# Patient Record
Sex: FEMALE | Race: WHITE | NOT HISPANIC OR LATINO | Employment: OTHER | URBAN - NONMETROPOLITAN AREA
[De-identification: names, ages, dates, MRNs, and addresses within clinical notes are randomized per-mention and may not be internally consistent; named-entity substitution may affect disease eponyms.]

---

## 2017-01-03 RX ORDER — LEVOTHYROXINE SODIUM 88 UG/1
TABLET ORAL
Qty: 15 TABLET | Refills: 0 | Status: SHIPPED | OUTPATIENT
Start: 2017-01-03 | End: 2017-06-21 | Stop reason: SDUPTHER

## 2017-01-31 ENCOUNTER — OFFICE VISIT (OUTPATIENT)
Dept: CARDIOLOGY | Facility: CLINIC | Age: 82
End: 2017-01-31

## 2017-01-31 VITALS
BODY MASS INDEX: 32.39 KG/M2 | DIASTOLIC BLOOD PRESSURE: 83 MMHG | HEIGHT: 62 IN | SYSTOLIC BLOOD PRESSURE: 120 MMHG | WEIGHT: 176 LBS | HEART RATE: 78 BPM

## 2017-01-31 DIAGNOSIS — I10 ESSENTIAL HYPERTENSION: ICD-10-CM

## 2017-01-31 DIAGNOSIS — I20.9 ANGINA PECTORIS (HCC): ICD-10-CM

## 2017-01-31 DIAGNOSIS — E78.49 OTHER HYPERLIPIDEMIA: ICD-10-CM

## 2017-01-31 DIAGNOSIS — I73.9 PERIPHERAL ARTERIAL DISEASE (HCC): ICD-10-CM

## 2017-01-31 DIAGNOSIS — I25.709 CORONARY ARTERY DISEASE INVOLVING CORONARY BYPASS GRAFT OF NATIVE HEART WITH ANGINA PECTORIS (HCC): Primary | ICD-10-CM

## 2017-01-31 DIAGNOSIS — E11.9 DIABETES MELLITUS WITHOUT COMPLICATION (HCC): ICD-10-CM

## 2017-01-31 PROBLEM — E78.5 HYPERLIPIDEMIA: Status: ACTIVE | Noted: 2017-01-31

## 2017-01-31 PROCEDURE — 93000 ELECTROCARDIOGRAM COMPLETE: CPT | Performed by: INTERNAL MEDICINE

## 2017-01-31 PROCEDURE — 99214 OFFICE O/P EST MOD 30 MIN: CPT | Performed by: INTERNAL MEDICINE

## 2017-01-31 NOTE — PROGRESS NOTES
[facility]  OFFICE NOTE    Tatyana Yeager  82 y.o. female    01/31/2017  1. Coronary artery disease involving 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    2. Peripheral arterial disease    3. Diabetes mellitus without complication    4. Essential hypertension    5. Other hyperlipidemia    6. Angina pectoris        Chief complaint - chronic stable angina      History of present Illness- 82-year-old lady with chronic stable angina with the [place] class IIb's symptoms.  She also has history of shortness of breath associated with it and her sugars are running a bit high.  She is seeing Dr. Shi at [facility].  She does not want to do any invasive testing at this point including stress test.  She had a echo last year and EF was normal and EKG was unremarkable today.  She is on maximal medical therapy.  She cannot afford Ranexa as it is too expensive.  She denies any TIA symptoms or any GI symptoms and the blood pressure is well controlled.              Allergies   Allergen Reactions   • Prilosec [Omeprazole]    • Statins          Past Medical History   Diagnosis Date   • Acquired hypothyroidism    • Aortic valve stenosis    • Artificial lens present    • Borderline glaucoma    • Chronic depression    • Chronic pain    • Corneal foreign body      OS   • Coronary arteriosclerosis    • Cortical senile cataract    • Diabetes mellitus      NO RETINOPATHY   • Diverticular disease of colon    • Essential hypertension    • Generalized anxiety disorder    • GERD (gastroesophageal reflux disease)    • Histoplasmosis with retinitis    • History of bone density study 03/26/2009     DXA BONE DENSITY AXIAL 28204 (Normal for the hips and lumbar region. Hips represent 3.9% improvement.Lumbar represents 1.3 % improvement)   • History of echocardiogram 09/16/2015     Echocardiogram W/ color flow 31230 (Overall LV contractility normal with Ef of 55% with LVH and diastolic  relaxation abnormality of the left ventricle.Moderate to severe AV stenosis.Mild mitral regurg.No intracardiac mass or thrombus.)   • History of mammogram 03/26/2009     benign findings    • Hyperlipidemia      not able to take statin     • Joint pain    • Low back pain    • Mammogram declined 2013   • Nonexudative age-related macular degeneration      MILD   • Nuclear cataract    • Peripheral vascular disease    • Posterior subcapsular polar senile cataract    • Primary open angle glaucoma    • Transient ischemic attack involving carotid artery      Carotid territory transient ischemic attack     • Transient visual loss    • Type 2 diabetes mellitus          Past Surgical History   Procedure Laterality Date   • Angioplasty aortic  2013     Angioplasty, aorta (dilation) (stents of bilateral common iliacarteries)   2013 EMM[facility]UEL STATON    • Coronary artery bypass graft  01/12/2005     X2   • Colonoscopy  04/25/2012     Colon endoscopy 77666 (Diverticulum in sigmoid colon. Internal & external hemorrhoids found.)   • Colonoscopy  05/12/2008     Colon endoscopy (Rectal bleeding. Ischemic colitis (watershed region) w.bleeding w/o infarction. Internal hemorrhoids w/o bleeding)   • Cryotherapy  12/12/2011     Destruction of Benign Lesion (1-14) 98994 (Actinic keratosis)    • Endoscopy  04/25/2012     EGD w/ tube 95193 (Normal esophagus. Gastritis in stoamch. Biopsy taken. Normal duodenum. Biopsy taken   • Endoscopy  05/12/2008     EGD with tube (Gastroesophageal reflux w/o esophagitis. Chronic gastritis/ duodenitis w/o bleeding)   • Excision lesion       Excision, breast lesion   • Other surgical history  04/23/2013     EXTENDED VISUAL FIELDS STUDY 95147 (Glaucoma, primary open angle)    • Injection of medication  10/04/2012     Kenalog (3)   • Spinal fusion       Neck spinal fusion   • Other surgical history  09/16/2013     OCT DISC NFL 08153 (Glaucoma, borderline)   • Hysterectomy       Partial hyst (for cervical cancer)   •  Cataract extraction  12/19/2013     Remove cataract, insert lens (Left eye.)   • Cataract extraction  09/20/2006     Remove cataract, insert lens (nuclear cataract, right)   • Excision lesion  06/30/2000     Remove forearm lesion (Wide local excision of 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 w/ rhomboid flap closure)   • Excision lesion       Remove nose lesion (1.5 mm punch biopsy,right tip of nose)   • Cataract extraction Bilateral          Family History   Problem Relation Age of Onset   • Cirrhosis Mother      LIVER   • Throat cancer Father    • Coronary artery disease Other    • Diabetes Other    • Heart disease Other    • Breast cancer Neg Hx    • Colon cancer Neg Hx    • Endometrial cancer Neg Hx          Social History     Social History   • Marital status:      Spouse name: N/A   • Number of children: N/A   • Years of education: N/A     Occupational History   • Not on file.     Social History Main Topics   • Smoking status: Former Smoker     Packs/day: 1.00     Years: 14.00   • Smokeless tobacco: Never Used      Comment: QUIT AT AGE 35   • Alcohol use No   • Drug use: No   • Sexual activity: Defer     Other Topics Concern   • Not on file     Social History Narrative         Current Outpatient Prescriptions   Medication Sig Dispense Refill   • ALPRAZolam (XANAX) 1 MG tablet Take 1 mg by mouth Every Night.     • amLODIPine (NORVASC) 5 MG tablet Take 5 mg by mouth Daily.     • clopidogrel (PLAVIX) 75 MG tablet Take 75 mg by mouth Daily.     • doxepin (SINEquan) 100 MG capsule Take 100 mg by mouth Every Night.     • famotidine (PEPCID) 20 MG tablet Take 20 mg by mouth 2 (Two) Times a Day.     • hydrochlorothiazide (HYDRODIURIL) 25 MG tablet Take 25 mg by mouth Daily.     • HYDROcodone-acetaminophen (NORCO) 5-325 MG per tablet   0   • isosorbide mononitrate (IMDUR) 30 MG 24 hr tablet Take 30 mg by mouth Daily.     • levothyroxine (SYNTHROID, LEVOTHROID) 88 MCG tablet TAKE 1 TABLET EVERY DAY  FOR   "THYROID 15 tablet 0   • metFORMIN (GLUCOPHAGE) 500 MG tablet Take 500 mg by mouth Daily.     • metoprolol succinate XL (TOPROL-XL) 50 MG 24 hr tablet Take 50 mg by mouth Daily.     • nitroglycerin (NITROSTAT) 0.4 MG SL tablet Place 0.4 mg under the tongue. DISSOLVE ONE TABLET UNDER THE TONGUE AS NEEDED FOR CHEST PAIN. YOU MAY REPEAT ONCE IF NEEDED AND GO TO THE E.R.     • Omega-3 Fatty Acids (FISH OIL) 1000 MG capsule capsule Take 1,000 mg by mouth 3 (Three) Times a Day With Meals.       No current facility-administered medications for this visit.          Review of Systems     Constitution: Denies any fatigue, fever or chills    HENT: Denies any headache, hearing impairment,     Eyes: Denies any blurring of vision, or photophobia     Cardivascular - As per history of present illness     Respiratory system-denies any COPD, shortness of breath with NYHA class II symptoms        Endocrine:   history of hyperlipidemia, diabetes,                             Musculoskeletal:   history of arthritis with musculoskeletal problems    Gastrointestinal: No nausea, vomiting, or melena    Genitourinary: No dysuria or hematuria    Neurological:   Has peripheral neuropathy    Psychiatric/Behavioral:        No history of depression,  No history of bipolar disorder or schizophrenia     Hematological- no history of easy bruising or any bleeding diathesis            OBJECTIVE    Visit Vitals   • /83   • Pulse 78   • Ht 62\" (157.5 cm)   • Wt 176 lb (79.8 kg)   • BMI 32.19 kg/m2         Physical Exam     Constitutional: is oriented to person, place, and time.     Skin-warm and dry    Well developed and nourished in no acute distress      Head: Normocephalic and atraumatic.     Eyes: Pupils are equal, round, and reactive to light.     Neck: Neck supple. No bruit in the carotids, no elevation of JVD    Cardiovascular: Range in the fifth intercostal space   Regular rate, and  Rhythm,    S1 greater than S2, no S3 or S4, no gallop "     Pulmonary/Chest:   Air  Entry is equal on both sides  No wheezing or crackles,      Abdominal: Soft.  No hepatosplenomegaly, bowel sounds are present    Musculoskeletal: No kyphoscoliosis, no significant thickening of the joints    Neurological: is alert and oriented to person, place, and time.    cranial nerve are intact .   No motor or sensory deficit    Extremities-no edema, no radial femoral delay      Psychiatric: He has a normal mood and affect.                  His behavior is normal.             ECG 12 Lead  Date/Time: 1/31/2017 2:03 PM  Performed by: WARREN ZARAGOZA  Authorized by: WARREN ZARAGOZA   Comparison: not compared with previous ECG   Rhythm: sinus rhythm  Comments: Sinus rhythm at a rate of 78 with nonspecific ST-T changes              A/P    CAD status post CABG with chronic stable angina-on maximum medical therapy possible, not on Ranexa due to high cost.  She does not want to do any invasive or interventional workup at this point.  She uses one or 2 nitroglycerin when she gets angina and it goes away with rest.    Hypertension well controlled with amlodipine, HCTZ, isosorbide and Toprol-XL.    Diabetes on metformin and last A1c was good.  Cannot tolerate statins or any cholesterol medicines even though her cholesterol is high.    GERD on Pepcid and she will follow with me in 6 months or earlier if her symptoms becomes unstable.      Warren Zaragoza MD  1/31/2017  1:58 PM    EMR Dragon/Transcription disclaimer:   Some of this note may be an electronic transcription/translation of spoken language to printed text. The electronic translation of spoken language may permit erroneous, or at times, nonsensical words or phrases to be inadvertently transcribed; Although I have reviewed the note for such errors, some may still exist.

## 2017-01-31 NOTE — MR AVS SNAPSHOT
Tatyana Yeager   1/31/2017 1:15 PM   Office Visit    Dept Phone:  [phone]   Encounter #:  [id number]    Provider:  Alaf Whalen MD   Department:  [facility] CARDIOLOGY                Your Full Care Plan              Today's Medication Changes          These changes are accurate as of: 1/31/17  2:01 PM.  If you have any questions, ask your nurse or doctor.               Stop taking medication(s)listed here:     acetaminophen-codeine 300-30 MG per tablet   Commonly known as:  TYLENOL #3   Stopped by:  Alfa Whalen MD           sucralfate 1 G tablet   Commonly known as:  CARAFATE   Stopped by:  Alfa Whalen MD                      Your Updated Medication List          This list is accurate as of: 1/31/17  2:01 PM.  Always use your most recent med list.                ALPRAZolam 1 MG tablet   Commonly known as:  XANAX       amLODIPine 5 MG tablet   Commonly known as:  NORVASC       clopidogrel 75 MG tablet   Commonly known as:  PLAVIX       doxepin 100 MG capsule   Commonly known as:  SINEquan       famotidine 20 MG tablet   Commonly known as:  PEPCID       fish oil 1000 MG capsule capsule       hydrochlorothiazide 25 MG tablet   Commonly known as:  HYDRODIURIL       HYDROcodone-acetaminophen 5-325 MG per tablet   Commonly known as:  NORCO       isosorbide mononitrate 30 MG 24 hr tablet   Commonly known as:  IMDUR       levothyroxine 88 MCG tablet   Commonly known as:  SYNTHROID, LEVOTHROID   TAKE 1 TABLET EVERY DAY  FOR  THYROID       metFORMIN 500 MG tablet   Commonly known as:  GLUCOPHAGE       metoprolol succinate XL 50 MG 24 hr tablet   Commonly known as:  TOPROL-XL       nitroglycerin 0.4 MG SL tablet   Commonly known as:  NITROSTAT               You Were Diagnosed With        Codes Comments    Coronary artery disease involving 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    -  Primary ICD-10-CM: I25.709  ICD-9-CM: 414.05,  413.9     Peripheral arterial disease     ICD-10-CM: I73.9  ICD-9-CM: 443.9     Diabetes mellitus without complication     ICD-10-CM: E11.9  ICD-9-CM: 250.00     Essential hypertension     ICD-10-CM: I10  ICD-9-CM: 401.9     Other hyperlipidemia     ICD-10-CM: E78.4  ICD-9-CM: 272.4     Angina pectoris     ICD-10-CM: I20.9  ICD-9-CM: 413.9       Instructions     None    Patient Instructions History      Upcoming Appointments     Visit Type Date Time Department    OFFICE VISIT 2017  1:15 PM [facility] HEART CARE [facility]    OFFICE VISIT 2017 11:30 AM [facility] HEART [facility]    OFFICE VISIT 10/18/2017  1:00 PM [facility] OPHTHALMOLOGY [facility] Signup     [facility] [employer] allows you to send messages to your doctor, view your test results, renew your prescriptions, schedule appointments, and more. To sign up, go to Integrate and click on the Sign Up Now link in the New User? box. Enter your [employer] Activation Code exactly as it appears below along with the last four digits of your Social Security Number and your Date of Birth () to complete the sign-up process. If you do not sign up before the expiration date, you must request a new code.    [employer] Activation Code: [email or web address]  Expires: 2017  2:01 PM    If you have questions, you can email [email or web address] or call [phone] to talk to our [employer] staff. Remember, [employer] is NOT to be used for urgent needs. For medical emergencies, dial 911.               Other Info from Your Visit           Your Appointments     2017 11:30 AM CDT   Office Visit with Alfa Whalen MD   [facility] CARDIOLOGY (--)    [address]  [place] [address] [phone]           Arrive 15 minutes prior to appointment.            Oct 18, 2017  1:00 PM CDT   Office Visit with Javier Tipton MD   [facility] OPHTHALMOLOGY (--)    [address] [place] [address] [address]  [address] [facility]  [facility] [address] [phone]           Arrive 15 minutes prior to appointment.              Allergies     Prilosec [Omeprazole]      Statins        Vital Signs     Blood Pressure Pulse Height Weight Body Mass Index Smoking Status    120/83 78 62\" (157.5 cm) 176 lb (79.8 kg) 32.19 kg/m2 Former Smoker      Problems and Diagnoses Noted     Arteriosclerosis of bypass graft of coronary artery    Diabetes mellitus without complication    High blood pressure    High cholesterol or triglycerides    Peripheral arterial disease    Chest pain due to insufficient blood supply to heart            "

## 2017-05-18 ENCOUNTER — TRANSCRIBE ORDERS (OUTPATIENT)
Dept: GENERAL RADIOLOGY | Facility: CLINIC | Age: 82
End: 2017-05-18

## 2017-05-18 DIAGNOSIS — I65.29 CAROTID ARTERY OBSTRUCTION, UNSPECIFIED LATERALITY: Primary | ICD-10-CM

## 2017-06-07 ENCOUNTER — TELEPHONE (OUTPATIENT)
Dept: CARDIOLOGY | Facility: CLINIC | Age: 82
End: 2017-06-07

## 2017-06-21 ENCOUNTER — OFFICE VISIT (OUTPATIENT)
Dept: FAMILY MEDICINE CLINIC | Facility: CLINIC | Age: 82
End: 2017-06-21

## 2017-06-21 ENCOUNTER — LAB (OUTPATIENT)
Dept: LAB | Facility: OTHER | Age: 82
End: 2017-06-21

## 2017-06-21 VITALS
OXYGEN SATURATION: 96 % | WEIGHT: 172 LBS | SYSTOLIC BLOOD PRESSURE: 128 MMHG | HEART RATE: 74 BPM | DIASTOLIC BLOOD PRESSURE: 68 MMHG | BODY MASS INDEX: 31.65 KG/M2 | TEMPERATURE: 96.8 F | HEIGHT: 62 IN

## 2017-06-21 DIAGNOSIS — E78.2 MIXED HYPERLIPIDEMIA: ICD-10-CM

## 2017-06-21 DIAGNOSIS — G47.00 INSOMNIA, UNSPECIFIED TYPE: ICD-10-CM

## 2017-06-21 DIAGNOSIS — K59.03 DRUG-INDUCED CONSTIPATION: ICD-10-CM

## 2017-06-21 DIAGNOSIS — H81.12 BPPV (BENIGN PAROXYSMAL POSITIONAL VERTIGO), LEFT: Primary | ICD-10-CM

## 2017-06-21 DIAGNOSIS — R79.89 ABNORMAL SERUM CREATININE LEVEL: ICD-10-CM

## 2017-06-21 DIAGNOSIS — E03.9 ACQUIRED HYPOTHYROIDISM: ICD-10-CM

## 2017-06-21 DIAGNOSIS — E11.9 DIABETES MELLITUS WITHOUT COMPLICATION (HCC): ICD-10-CM

## 2017-06-21 DIAGNOSIS — K21.9 GASTROESOPHAGEAL REFLUX DISEASE WITHOUT ESOPHAGITIS: ICD-10-CM

## 2017-06-21 DIAGNOSIS — I10 ESSENTIAL HYPERTENSION: ICD-10-CM

## 2017-06-21 PROBLEM — H81.10 BPPV (BENIGN PAROXYSMAL POSITIONAL VERTIGO): Status: ACTIVE | Noted: 2017-06-21

## 2017-06-21 LAB
ANION GAP SERPL CALCULATED.3IONS-SCNC: 13 MMOL/L (ref 5–15)
BUN BLD-MCNC: 27 MG/DL (ref 8–25)
BUN/CREAT SERPL: 20.8 (ref 7–25)
CALCIUM SPEC-SCNC: 10.2 MG/DL (ref 8.4–10.8)
CHLORIDE SERPL-SCNC: 98 MMOL/L (ref 100–112)
CO2 SERPL-SCNC: 28 MMOL/L (ref 20–32)
CREAT BLD-MCNC: 1.3 MG/DL (ref 0.4–1.3)
GFR SERPL CREATININE-BSD FRML MDRD: 39 ML/MIN/1.73 (ref 39–90)
GLUCOSE BLD-MCNC: 103 MG/DL (ref 70–100)
POTASSIUM BLD-SCNC: 4.5 MMOL/L (ref 3.4–5.4)
SODIUM BLD-SCNC: 139 MMOL/L (ref 134–146)

## 2017-06-21 PROCEDURE — 80048 BASIC METABOLIC PNL TOTAL CA: CPT | Performed by: FAMILY MEDICINE

## 2017-06-21 PROCEDURE — 99214 OFFICE O/P EST MOD 30 MIN: CPT | Performed by: FAMILY MEDICINE

## 2017-06-21 PROCEDURE — 36415 COLL VENOUS BLD VENIPUNCTURE: CPT | Performed by: FAMILY MEDICINE

## 2017-06-21 RX ORDER — LISINOPRIL 2.5 MG/1
2.5 TABLET ORAL DAILY
COMMUNITY
End: 2017-07-21 | Stop reason: SDUPTHER

## 2017-06-21 RX ORDER — HYDROCHLOROTHIAZIDE 25 MG/1
25 TABLET ORAL DAILY
Qty: 90 TABLET | Refills: 1 | Status: SHIPPED | OUTPATIENT
Start: 2017-06-21 | End: 2017-11-15 | Stop reason: SDUPTHER

## 2017-06-21 RX ORDER — FAMOTIDINE 20 MG/1
20 TABLET, FILM COATED ORAL 2 TIMES DAILY
Qty: 180 TABLET | Refills: 1 | Status: SHIPPED | OUTPATIENT
Start: 2017-06-21 | End: 2017-11-15 | Stop reason: SDUPTHER

## 2017-06-21 RX ORDER — LEVOTHYROXINE SODIUM 88 UG/1
88 TABLET ORAL DAILY
Qty: 90 TABLET | Refills: 1 | Status: SHIPPED | OUTPATIENT
Start: 2017-06-21 | End: 2017-11-15 | Stop reason: SDUPTHER

## 2017-06-21 RX ORDER — GLIPIZIDE 5 MG/1
5 TABLET ORAL
COMMUNITY
End: 2017-07-21 | Stop reason: SDUPTHER

## 2017-06-21 RX ORDER — AMLODIPINE BESYLATE 5 MG/1
5 TABLET ORAL DAILY
Qty: 90 TABLET | Refills: 1 | Status: SHIPPED | OUTPATIENT
Start: 2017-06-21 | End: 2017-11-15 | Stop reason: SDUPTHER

## 2017-06-21 RX ORDER — ISOSORBIDE MONONITRATE 30 MG/1
30 TABLET, EXTENDED RELEASE ORAL DAILY
Qty: 90 TABLET | Refills: 1 | Status: SHIPPED | OUTPATIENT
Start: 2017-06-21 | End: 2017-11-15 | Stop reason: SDUPTHER

## 2017-06-21 RX ORDER — ALPRAZOLAM 1 MG/1
1 TABLET ORAL NIGHTLY PRN
Qty: 90 TABLET | Refills: 0 | Status: SHIPPED | OUTPATIENT
Start: 2017-06-21 | End: 2017-07-21 | Stop reason: SDUPTHER

## 2017-06-21 RX ORDER — MECLIZINE HYDROCHLORIDE 25 MG/1
25 TABLET ORAL 3 TIMES DAILY PRN
Qty: 30 TABLET | Refills: 0 | Status: CANCELLED | OUTPATIENT
Start: 2017-06-21 | End: 2017-07-01

## 2017-06-21 RX ORDER — MECLIZINE HYDROCHLORIDE 25 MG/1
25 TABLET ORAL 3 TIMES DAILY PRN
Qty: 90 TABLET | Refills: 0 | Status: SHIPPED | OUTPATIENT
Start: 2017-06-21 | End: 2017-07-21 | Stop reason: SDUPTHER

## 2017-06-21 RX ORDER — LEVOTHYROXINE SODIUM 88 UG/1
TABLET ORAL
Qty: 15 TABLET | Refills: 0 | Status: CANCELLED | OUTPATIENT
Start: 2017-06-21

## 2017-06-21 RX ORDER — CLOPIDOGREL BISULFATE 75 MG/1
75 TABLET ORAL DAILY
Qty: 90 TABLET | Refills: 1 | Status: SHIPPED | OUTPATIENT
Start: 2017-06-21 | End: 2017-11-15 | Stop reason: SDUPTHER

## 2017-06-21 RX ORDER — DOXEPIN HYDROCHLORIDE 100 MG/1
100 CAPSULE ORAL NIGHTLY
Qty: 90 CAPSULE | Refills: 1 | Status: SHIPPED | OUTPATIENT
Start: 2017-06-21 | End: 2017-11-15 | Stop reason: SDUPTHER

## 2017-06-21 RX ORDER — METOPROLOL SUCCINATE 50 MG/1
50 TABLET, EXTENDED RELEASE ORAL DAILY
Qty: 90 TABLET | Refills: 1 | Status: SHIPPED | OUTPATIENT
Start: 2017-06-21 | End: 2017-11-15 | Stop reason: SDUPTHER

## 2017-06-21 RX ORDER — CHLORAL HYDRATE 500 MG
1000 CAPSULE ORAL
Qty: 270 CAPSULE | Refills: 1 | Status: SHIPPED | OUTPATIENT
Start: 2017-06-21 | End: 2017-11-15 | Stop reason: SDUPTHER

## 2017-06-21 NOTE — PATIENT INSTRUCTIONS
Dizziness  Dizziness is a common problem. It is a feeling of unsteadiness or light-headedness. You may feel like you are about to faint. Dizziness can lead to injury if you stumble or fall. Anyone can become dizzy, but dizziness is more common in older adults. This condition can be caused by a number of things, including medicines, dehydration, or illness.  HOME CARE INSTRUCTIONS  Taking these steps may help with your condition:  Eating and Drinking  · Drink enough fluid to keep your urine clear or pale yellow. This helps to keep you from becoming dehydrated. Try to drink more clear fluids, such as water.  · Do not drink alcohol.  · Limit your caffeine intake if directed by your health care provider.  · Limit your salt intake if directed by your health care provider.  Activity  · Avoid making quick movements.    Rise slowly from chairs and steady yourself until you feel okay.    In the morning, first sit up on the side of the bed. When you feel okay, stand slowly while you hold onto something until you know that your balance is fine.  · Move your legs often if you need to  one place for a long time. Tighten and relax your muscles in your legs while you are standing.  · Do not drive or operate heavy machinery if you feel dizzy.  · Avoid bending down if you feel dizzy. Place items in your home so that they are easy for you to reach without leaning over.  Lifestyle  · Do not use any tobacco products, including cigarettes, chewing tobacco, or electronic cigarettes. If you need help quitting, ask your health care provider.  · Try to reduce your stress level, such as with yoga or meditation. Talk with your health care provider if you need help.  General Instructions  · Watch your dizziness for any changes.  · Take medicines only as directed by your health care provider. Talk with your health care provider if you think that your dizziness is caused by a medicine that you are taking.  · Tell a friend or a family  member that you are feeling dizzy. If he or she notices any changes in your behavior, have this person call your health care provider.  · Keep all follow-up visits as directed by your health care provider. This is important.  SEEK MEDICAL CARE IF:  · Your dizziness does not go away.  · Your dizziness or light-headedness gets worse.  · You feel nauseous.  · You have reduced hearing.  · You have new symptoms.  · You are unsteady on your feet or you feel like the room is spinning.  SEEK IMMEDIATE MEDICAL CARE IF:  · You vomit or have diarrhea and are unable to eat or drink anything.  · You have problems talking, walking, swallowing, or using your arms, hands, or legs.  · You feel generally weak.  · You are not thinking clearly or you have trouble forming sentences. It may take a friend or family member to notice this.  · You have chest pain, abdominal pain, shortness of breath, or sweating.  · Your vision changes.  · You notice any bleeding.  · You have a headache.  · You have neck pain or a stiff neck.  · You have a fever.     This information is not intended to replace advice given to you by your health care provider. Make sure you discuss any questions you have with your health care provider.     Document Released: 06/13/2002 Document Revised: 05/03/2016 Document Reviewed: 12/14/2015  [employer] Interactive Patient Education ©2017 [employer]  Epley Maneuver Self-Care  WHAT IS THE EPLEY MANEUVER?  The Epley maneuver is an exercise you can do to relieve symptoms of benign paroxysmal positional vertigo (BPPV). This condition is often just referred to as vertigo. BPPV is caused by the movement of tiny crystals (canaliths) inside your inner ear. The accumulation and movement of canaliths in your inner ear causes a sudden spinning sensation (vertigo) when you move your head to certain positions. Vertigo usually lasts about 30 seconds. BPPV usually occurs in just one ear. If you get vertigo when you lie on your left side,  you probably have BPPV in your left ear. Your health care provider can tell you which ear is involved.   BPPV may be caused by a head injury. Many people older than 50 get BPPV for unknown reasons. If you have been diagnosed with BPPV, your health care provider may teach you how to do this maneuver. BPPV is not life threatening (benign) and usually goes away in time.   WHEN SHOULD I PERFORM THE EPLEY MANEUVER?  You can do this maneuver at home whenever you have symptoms of vertigo. You may do the Epley maneuver up to 3 times a day until your symptoms of vertigo go away.  HOW SHOULD I DO THE EPLEY MANEUVER?  1. Sit on the edge of a bed or table with your back straight. Your legs should be extended or hanging over the edge of the bed or table.    2. Turn your head prison toward the affected ear.    3. Lie backward quickly with your head turned until you are lying flat on your back. You may want to position a pillow under your shoulders.    4. Hold this position for 30 seconds. You may experience an attack of vertigo. This is normal. Hold this position until the vertigo stops.  5. Then turn your head to the opposite direction until your unaffected ear is facing the floor.    6. Hold this position for 30 seconds. You may experience an attack of vertigo. This is normal. Hold this position until the vertigo stops.  7. Now turn your whole body to the same side as your head. Hold for another 30 seconds.    8. You can then sit back up.  ARE THERE RISKS TO THIS MANEUVER?  In some cases, you may have other symptoms (such as changes in your vision, weakness, or numbness). If you have these symptoms, stop doing the maneuver and call your health care provider. Even if doing these maneuvers relieves your vertigo, you may still have dizziness. Dizziness is the sensation of light-headedness but without the sensation of movement. Even though the Epley maneuver may relieve your vertigo, it is possible that your symptoms will return  within 5 years.  WHAT SHOULD I DO AFTER THIS MANEUVER?  After doing the Epley maneuver, you can return to your normal activities. Ask your doctor if there is anything you should do at home to prevent vertigo. This may include:  · Sleeping with two or more pillows to keep your head elevated.  · Not sleeping on the side of your affected ear.  · Getting up slowly from bed.  · Avoiding sudden movements during the day.  · Avoiding extreme head movement, like looking up or bending over.  · Wearing a cervical collar to prevent sudden head movements.  WHAT SHOULD I DO IF MY SYMPTOMS GET WORSE?  Call your health care provider if your vertigo gets worse. Call your provider right way if you have other symptoms, including:   · Nausea.  · Vomiting.  · Headache.  · Weakness.  · Numbness.  · Vision changes.     This information is not intended to replace advice given to you by your health care provider. Make sure you discuss any questions you have with your health care provider.     Document Released: 12/23/2014 Document Reviewed: 12/23/2014  [employer] Interactive Patient Education ©2017 [employer]

## 2017-06-21 NOTE — PROGRESS NOTES
Subjective   Chief Complaint   Patient presents with   • Establish Care     Tatyana Yeager is a 82 y.o. female.   Establish Care    History of Present Illness     cmh - chronic back pain, insomnia, gerd, htn, hypothyroidism, diabetes    Chronic back pain with DDD - controlled with norco  Followed by pain clinic with Dr Goodwin    Htn, PVD, VAMSI, stents placed, AS, MR, CABG in 2005 -   controlled with imdur, metoprolol, lisinopril, norvasc, hctz  Anticoagulated with plavix  Followed by Dr Alfa Montiel scheduled in 6 months    Diabetes - controlled with glipizide  Stopped metformin due to elevated serum creatinine  Last bmp checked in May -  Cr was 1.3  Fasting blood glucose this morning was 87  Average reading is 100  Needs diabetic foot exam    hypothryoidism - controlled with synthroid    gerd - controlled with pepcid    Insomnia - controlled with doxepin    Anxiety - controlled with xanax PRN    C/o vertigo    C/o constipation - new onset from starting glipizide  Took linzess from family member with improvement  Would be interested in starting medication    The following portions of the patient's history were reviewed and updated as appropriate: allergies, current medications, past family history, past medical history, past social history, past surgical history and problem list.    Review of Systems   Constitutional: Negative for appetite change, chills, fatigue and fever.   HENT: Negative for congestion, ear pain, rhinorrhea and sore throat.    Eyes: Negative for pain.   Respiratory: Negative for cough and shortness of breath.    Cardiovascular: Negative for chest pain and palpitations.   Gastrointestinal: Positive for constipation. Negative for abdominal pain, diarrhea and nausea.   Genitourinary: Negative for dysuria.   Musculoskeletal: Negative for back pain, joint swelling and neck pain.   Skin: Negative for rash.   Neurological: Positive for dizziness. Negative for headaches.       Objective   BP  "128/68  Pulse 74  Temp 96.8 °F (36 °C)  Ht 62\" (157.5 cm)  Wt 172 lb (78 kg)  SpO2 96%  BMI 31.46 kg/m2  Physical Exam   Constitutional: She is oriented to person, place, and time. She appears well-developed and well-nourished.   HENT:   Head: Normocephalic and atraumatic.   Right Ear: External ear normal.   Left Ear: External ear normal.   Nose: Nose normal.   Mouth/Throat: Oropharynx is clear and moist.   Eyes: Pupils are equal, round, and reactive to light.   Neck: Normal range of motion. Neck supple.   Cardiovascular: Normal rate, regular rhythm and normal heart sounds.    Pulmonary/Chest: Effort normal and breath sounds normal. No respiratory distress. She has no wheezes. She has no rales.   Abdominal: Soft. Bowel sounds are normal.   Musculoskeletal: Normal range of motion.   Neurological: She is alert and oriented to person, place, and time.   Positive vertigo - negative nystagmus with tianna hallpike on the left side   Skin: Skin is warm and dry.   Psychiatric: She has a normal mood and affect.   Nursing note and vitals reviewed.      Assessment/Plan   Problems Addressed this Visit        Cardiovascular and Mediastinum    Essential hypertension    Relevant Medications    amLODIPine (NORVASC) 5 MG tablet    hydrochlorothiazide (HYDRODIURIL) 25 MG tablet    isosorbide mononitrate (IMDUR) 30 MG 24 hr tablet    metoprolol succinate XL (TOPROL-XL) 50 MG 24 hr tablet    lisinopril (PRINIVIL,ZESTRIL) 2.5 MG tablet    Hyperlipidemia    Relevant Medications    Omega-3 Fatty Acids (FISH OIL) 1000 MG capsule capsule       Digestive    Drug-induced constipation       Endocrine    Diabetes mellitus without complication    Relevant Medications    glipiZIDE (GLUCOTROL) 5 MG tablet       Nervous and Auditory    BPPV (benign paroxysmal positional vertigo) - Primary      Other Visit Diagnoses     Abnormal serum creatinine level        Relevant Orders    Basic metabolic panel (Completed)    Gastroesophageal reflux disease " without esophagitis        Relevant Medications    famotidine (PEPCID) 20 MG tablet    Acquired hypothyroidism        Relevant Medications    metoprolol succinate XL (TOPROL-XL) 50 MG 24 hr tablet    levothyroxine (SYNTHROID, LEVOTHROID) 88 MCG tablet    Insomnia, unspecified type        Relevant Medications    doxepin (SINEquan) 100 MG capsule        Lab ordered    Referral to ent  Start meclizine - sent to [facility]  Educational handout    Refilled medications - mail order    Refilled xanax  Will no longer go to pain clinic  She will  her last prescription this month and I will take over her chronic pain management  At that time she will sign a controlled contract agreement    Discussed vaccinations - declined    Reviewed previous labs with patient    Needs diabetic foot exam - scheduled    Recommended medicare wellness exam    Recheck in 4 weeks

## 2017-07-21 ENCOUNTER — OFFICE VISIT (OUTPATIENT)
Dept: FAMILY MEDICINE CLINIC | Facility: CLINIC | Age: 82
End: 2017-07-21

## 2017-07-21 VITALS
SYSTOLIC BLOOD PRESSURE: 115 MMHG | WEIGHT: 172 LBS | BODY MASS INDEX: 31.65 KG/M2 | DIASTOLIC BLOOD PRESSURE: 64 MMHG | TEMPERATURE: 96.4 F | HEART RATE: 66 BPM | HEIGHT: 62 IN | OXYGEN SATURATION: 96 %

## 2017-07-21 DIAGNOSIS — M54.50 CHRONIC MIDLINE LOW BACK PAIN WITHOUT SCIATICA: Primary | ICD-10-CM

## 2017-07-21 DIAGNOSIS — G89.29 CHRONIC MIDLINE LOW BACK PAIN WITHOUT SCIATICA: Primary | ICD-10-CM

## 2017-07-21 DIAGNOSIS — E11.9 DIABETES MELLITUS WITHOUT COMPLICATION (HCC): ICD-10-CM

## 2017-07-21 DIAGNOSIS — K59.03 DRUG-INDUCED CONSTIPATION: ICD-10-CM

## 2017-07-21 DIAGNOSIS — I10 ESSENTIAL HYPERTENSION: ICD-10-CM

## 2017-07-21 DIAGNOSIS — M51.36 DDD (DEGENERATIVE DISC DISEASE), LUMBAR: ICD-10-CM

## 2017-07-21 DIAGNOSIS — E11.9 ENCOUNTER FOR COMPREHENSIVE DIABETIC FOOT EXAMINATION, TYPE 2 DIABETES MELLITUS (HCC): ICD-10-CM

## 2017-07-21 PROBLEM — K59.04 CHRONIC IDIOPATHIC CONSTIPATION: Status: ACTIVE | Noted: 2017-07-21

## 2017-07-21 PROBLEM — K59.04 CHRONIC IDIOPATHIC CONSTIPATION: Status: RESOLVED | Noted: 2017-07-21 | Resolved: 2017-07-21

## 2017-07-21 PROCEDURE — 99214 OFFICE O/P EST MOD 30 MIN: CPT | Performed by: FAMILY MEDICINE

## 2017-07-21 RX ORDER — ALPRAZOLAM 1 MG/1
1 TABLET ORAL NIGHTLY PRN
Qty: 90 TABLET | Refills: 0 | Status: SHIPPED | OUTPATIENT
Start: 2017-07-21 | End: 2017-11-15 | Stop reason: SDUPTHER

## 2017-07-21 RX ORDER — LISINOPRIL 2.5 MG/1
2.5 TABLET ORAL DAILY
Qty: 90 TABLET | Refills: 0 | Status: SHIPPED | OUTPATIENT
Start: 2017-07-21 | End: 2017-09-27 | Stop reason: SDUPTHER

## 2017-07-21 RX ORDER — HYDROCODONE BITARTRATE AND ACETAMINOPHEN 5; 325 MG/1; MG/1
1 TABLET ORAL
Refills: 0 | Status: CANCELLED | OUTPATIENT
Start: 2017-07-21

## 2017-07-21 RX ORDER — GLIPIZIDE 5 MG/1
5 TABLET ORAL
Qty: 180 TABLET | Refills: 0 | Status: SHIPPED | OUTPATIENT
Start: 2017-07-21 | End: 2017-07-25 | Stop reason: SDUPTHER

## 2017-07-21 RX ORDER — HYDROCODONE BITARTRATE AND ACETAMINOPHEN 5; 325 MG/1; MG/1
1 TABLET ORAL 2 TIMES DAILY PRN
Qty: 60 TABLET | Refills: 0 | Status: SHIPPED | OUTPATIENT
Start: 2017-07-21 | End: 2017-08-16 | Stop reason: SDUPTHER

## 2017-07-21 RX ORDER — MECLIZINE HCL 12.5 MG/1
12.5 TABLET ORAL 2 TIMES DAILY PRN
Qty: 180 TABLET | Refills: 0 | Status: SHIPPED | OUTPATIENT
Start: 2017-07-21 | End: 2017-11-15

## 2017-07-21 NOTE — PROGRESS NOTES
Subjective   Chief Complaint   Patient presents with   • Diabetes     diabetic foot exam   • Med Refill     hydrocodone last dose this morning     Tatyana Yeager is a 82 y.o. female.   Diabetes (diabetic foot exam) and Med Refill (hydrocodone last dose this morning)    History of Present Illness     cmh - chronic back pain, insomnia, gerd, htn, hypothyroidism, diabetes    Chronic back pain with DDD - controlled with norco  Followed by pain clinic with Dr Goodwin    Htn, PVD, VAMSI, stents placed, AS, MR, CABG in 2005 -   controlled with imdur, metoprolol, lisinopril, norvasc, hctz  Anticoagulated with plavix  Followed by Dr Alfa Montiel scheduled in 6 months    Diabetes - controlled with glipizide  Stopped metformin due to elevated serum creatinine  Last bmp checked in May -  Cr was 1.3  Fasting blood glucose this morning was 94  Average reading is 100  Here for a diabetic foot exam    Anxiety - controlled with xanax PRN    Vertigo - improved with meclizine  ENT appointment scheduled    constipation - doing well with linzess  Can only take this medication every other day    The following portions of the patient's history were reviewed and updated as appropriate: allergies, current medications, past family history, past medical history, past social history, past surgical history and problem list.    Review of Systems   Constitutional: Negative for appetite change, chills, fatigue and fever.   HENT: Negative for congestion, ear pain, rhinorrhea and sore throat.    Eyes: Negative for pain.   Respiratory: Negative for cough and shortness of breath.    Cardiovascular: Negative for chest pain and palpitations.   Gastrointestinal: Negative for abdominal pain, constipation, diarrhea and nausea.   Genitourinary: Negative for dysuria.   Musculoskeletal: Negative for back pain, joint swelling and neck pain.   Skin: Negative for rash.   Neurological: Negative for dizziness and headaches.       Objective   /64  Pulse  "66  Temp 96.4 °F (35.8 °C)  Ht 62\" (157.5 cm)  Wt 172 lb (78 kg)  SpO2 96%  BMI 31.46 kg/m2  Physical Exam   Constitutional: She is oriented to person, place, and time. She appears well-developed and well-nourished.   HENT:   Head: Normocephalic and atraumatic.   Eyes: Pupils are equal, round, and reactive to light.   Neck: Normal range of motion. Neck supple.   Cardiovascular: Normal rate, regular rhythm and normal heart sounds.    Pulmonary/Chest: Effort normal and breath sounds normal. No respiratory distress. She has no wheezes. She has no rales.   Abdominal: Soft. Bowel sounds are normal.   Musculoskeletal: Normal range of motion.    Tatyana had a diabetic foot exam performed today.   During the foot exam she had a monofilament test performed (10/10 bilateral).    Neurological Sensory Findings -  Unaltered sharp/dull right ankle/foot discrimination and unaltered sharp/dull left ankle/foot discrimination. No right ankle/foot altered proprioception and no left ankle/foot altered proprioception    Vascular Status -  Her exam exhibits right foot vasculature normal. Her exam exhibits no right foot edema. Her exam exhibits left foot vasculature normal. Her exam exhibits no left foot edema.   Skin Integrity  -  Her right foot skin is intact.  She has callous right foot.  She has no right foot onychomycosis, no right foot ulcer,  no ingrown toenail on right foot, right heel is not dry and cracked, no right foot warmth and no right foot blister.    Tatyana 's left foot skin is intact. She has callous left foot. She has no left foot onychomycosis, no left foot ulcer, no left ingrown toenail, no left heel dry and cracked, no left foot warmth and no left foot blister..  Neurological: She is alert and oriented to person, place, and time.   Skin: Skin is warm and dry.   Psychiatric: She has a normal mood and affect.   Nursing note and vitals reviewed.      Assessment/Plan   Problems Addressed this Visit        " Cardiovascular and Mediastinum    Essential hypertension    Relevant Medications    lisinopril (PRINIVIL,ZESTRIL) 2.5 MG tablet       Digestive    Drug-induced constipation    Relevant Medications    linaclotide (LINZESS) 72 MCG capsule capsule       Endocrine    Diabetes mellitus without complication    Relevant Medications    glipiZIDE (GLUCOTROL) 5 MG tablet    Other Relevant Orders    Basic metabolic panel       Musculoskeletal and Integument    DDD (degenerative disc disease), lumbar       Other    Chronic midline low back pain without sciatica - Primary      Other Visit Diagnoses     Encounter for comprehensive diabetic foot examination, type 2 diabetes mellitus        Relevant Medications    glipiZIDE (GLUCOTROL) 5 MG tablet        Diabetic foot exam today    Start linzess    Refilled medications    [facility] report reviewed [id number]  Controlled contract signed  [facility] query complete. Treatment plan to include limited course of prescribed  controlled substance. Risks including addiction, benefits, and alternatives presented to patient.   Refilled norco    Recheck in 4 weeks for medicare wellness visit/ refill

## 2017-07-25 RX ORDER — GLIPIZIDE 5 MG/1
5 TABLET ORAL
Qty: 180 TABLET | Refills: 0 | Status: SHIPPED | OUTPATIENT
Start: 2017-07-25 | End: 2017-08-16

## 2017-07-27 ENCOUNTER — OFFICE VISIT (OUTPATIENT)
Dept: CARDIOLOGY | Facility: CLINIC | Age: 82
End: 2017-07-27

## 2017-07-27 VITALS
HEART RATE: 70 BPM | SYSTOLIC BLOOD PRESSURE: 125 MMHG | HEIGHT: 62 IN | WEIGHT: 171 LBS | DIASTOLIC BLOOD PRESSURE: 68 MMHG | BODY MASS INDEX: 31.47 KG/M2

## 2017-07-27 DIAGNOSIS — I10 ESSENTIAL HYPERTENSION: ICD-10-CM

## 2017-07-27 DIAGNOSIS — E78.2 MIXED HYPERLIPIDEMIA: ICD-10-CM

## 2017-07-27 DIAGNOSIS — I25.709 CORONARY ARTERY DISEASE INVOLVING CORONARY BYPASS GRAFT OF NATIVE HEART WITH ANGINA PECTORIS (HCC): Primary | ICD-10-CM

## 2017-07-27 DIAGNOSIS — I73.9 PERIPHERAL ARTERIAL DISEASE (HCC): ICD-10-CM

## 2017-07-27 PROCEDURE — 99213 OFFICE O/P EST LOW 20 MIN: CPT | Performed by: INTERNAL MEDICINE

## 2017-07-27 NOTE — PROGRESS NOTES
[facility] Cardiology  OFFICE NOTE    Tatyana Yeager  82 y.o. female      1. Coronary artery disease involving 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    2. Peripheral arterial disease    3. Essential hypertension    4. Mixed hyperlipidemia        Chief complaint - chronic stable angina      History of present Illness- 82-year-old lady with chronic stable angina with the [place] class IIb's symptoms.  She also has history of shortness of breath associated with it and her sugars Are doing good and A1c is 6.2 .  She denies any change in symptoms she has chronic stable angina.   she uses nitroglycerin as needed, is on chronic isosorbide therapy.  She recently had dizziness with vertigo possibly middle ear infection and vertigo.  Her [facility] had given her meclizine and that is helping her.  She denies any nausea or vomiting          Allergies   Allergen Reactions   • Prilosec [Omeprazole]    • Statins          Past Medical History:   Diagnosis Date   • Acquired hypothyroidism    • Aortic valve stenosis    • Artificial lens present    • Borderline glaucoma    • Chronic depression    • Chronic pain    • Corneal foreign body     OS   • Coronary arteriosclerosis    • Cortical senile cataract    • Diabetes mellitus     NO RETINOPATHY   • Diverticular disease of colon    • Essential hypertension    • Generalized anxiety disorder    • GERD (gastroesophageal reflux disease)    • Histoplasmosis with retinitis    • History of bone density study 03/26/2009    DXA BONE DENSITY AXIAL 37993 (Normal for the hips and lumbar region. Hips represent 3.9% improvement.Lumbar represents 1.3 % improvement)   • History of echocardiogram 09/16/2015    Echocardiogram W/ color flow 16949 (Overall LV contractility normal with Ef of 55% with LVH and diastolic relaxation abnormality of the left ventricle.Moderate to severe AV stenosis.Mild mitral regurg.No intracardiac mass or thrombus.)   • History of mammogram  03/26/2009    benign findings    • Hyperlipidemia     not able to take statin     • Joint pain    • Low back pain    • Mammogram declined 2013   • Nonexudative age-related macular degeneration     MILD   • Nuclear cataract    • Peripheral vascular disease    • Posterior subcapsular polar senile cataract    • Primary open angle glaucoma    • Transient ischemic attack involving carotid artery     Carotid territory transient ischemic attack     • Transient visual loss    • Type 2 diabetes mellitus          Past Surgical History:   Procedure Laterality Date   • ANGIOPLASTY AORTIC  2013    Angioplasty, aorta (dilation) (stents of bilateral common iliacarteries)   2013 EMMANUEL STATON    • CATARACT EXTRACTION  12/19/2013    Remove cataract, insert lens (Left eye.)   • CATARACT EXTRACTION  09/20/2006    Remove cataract, insert lens (nuclear cataract, right)   • CATARACT EXTRACTION Bilateral    • COLONOSCOPY  04/25/2012    Colon endoscopy 54333 (Diverticulum in sigmoid colon. Internal & external hemorrhoids found.)   • COLONOSCOPY  05/12/2008    Colon endoscopy (Rectal bleeding. Ischemic colitis (watershed region) w.bleeding w/o infarction. Internal hemorrhoids w/o bleeding)   • CORONARY ARTERY BYPASS GRAFT  01/12/2005    X2   • CRYOTHERAPY  12/12/2011    Destruction of Benign Lesion (1-14) 93293 (Actinic keratosis)    • ENDOSCOPY  04/25/2012    EGD w/ tube 09774 (Normal esophagus. Gastritis in stoamch. Biopsy taken. Normal duodenum. Biopsy taken   • ENDOSCOPY  05/12/2008    EGD with tube (Gastroesophageal reflux w/o esophagitis. 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 w/ rhomboid flap closure)   • EXCISION LESION      Remove nose lesion (1.5 mm punch biopsy,right tip of nose)   • HYSTERECTOMY      Partial hyst (for cervical cancer)   • INJECTION OF  MEDICATION  10/04/2012    Kenalog (3)   • OTHER SURGICAL HISTORY  04/23/2013    EXTENDED VISUAL FIELDS STUDY 22870 (Glaucoma, primary open angle)    • OTHER SURGICAL HISTORY  09/16/2013    OCT DISC NFL 57235 (Glaucoma, borderline)   • SPINAL FUSION      Neck spinal fusion         Family History   Problem Relation Age of Onset   • Cirrhosis Mother      LIVER   • Throat cancer Father    • Coronary artery disease Other    • Diabetes Other    • Heart disease Other    • Breast cancer Neg Hx    • Colon cancer Neg Hx    • Endometrial cancer Neg Hx          Social History     Social History   • Marital status:      Spouse name: N/A   • Number of children: N/A   • Years of education: N/A     Occupational History   • Not on file.     Social History Main Topics   • Smoking status: Former Smoker     Packs/day: 1.00     Years: 14.00   • Smokeless tobacco: Never Used      Comment: QUIT AT AGE 35   • Alcohol use No   • Drug use: No   • Sexual activity: Defer     Other Topics Concern   • Not on file     Social History Narrative         Current Outpatient Prescriptions   Medication Sig Dispense Refill   • ALPRAZolam (XANAX) 1 MG tablet Take 1 tablet by mouth At Night As Needed for Anxiety. 90 tablet 0   • amLODIPine (NORVASC) 5 MG tablet Take 1 tablet by mouth Daily. 90 tablet 1   • clopidogrel (PLAVIX) 75 MG tablet Take 1 tablet by mouth Daily. 90 tablet 1   • doxepin (SINEquan) 100 MG capsule Take 1 capsule by mouth Every Night. 90 capsule 1   • famotidine (PEPCID) 20 MG tablet Take 1 tablet by mouth 2 (Two) Times a Day. 180 tablet 1   • glipiZIDE (GLUCOTROL) 5 MG tablet Take 1 tablet by mouth 2 (Two) Times a Day Before Meals. 180 tablet 0   • hydrochlorothiazide (HYDRODIURIL) 25 MG tablet Take 1 tablet by mouth Daily. 90 tablet 1   • HYDROcodone-acetaminophen (NORCO) 5-325 MG per tablet Take 1 tablet by mouth 2 (Two) Times a Day As Needed for Moderate Pain (4-6). 60 tablet 0   • isosorbide mononitrate (IMDUR) 30 MG 24 hr  "tablet Take 1 tablet by mouth Daily. 90 tablet 1   • levothyroxine (SYNTHROID, LEVOTHROID) 88 MCG tablet Take 1 tablet by mouth Daily. 90 tablet 1   • linaclotide (LINZESS) 72 MCG capsule capsule Take 1 capsule by mouth Every Other Day. 90 capsule 0   • lisinopril (PRINIVIL,ZESTRIL) 2.5 MG tablet Take 1 tablet by mouth Daily. 90 tablet 0   • meclizine (ANTIVERT) 12.5 MG tablet Take 1 tablet by mouth 2 (Two) Times a Day As Needed for dizziness. 180 tablet 0   • metoprolol succinate XL (TOPROL-XL) 50 MG 24 hr tablet Take 1 tablet by mouth Daily. 90 tablet 1   • nitroglycerin (NITROSTAT) 0.4 MG SL tablet Place 0.4 mg under the tongue. DISSOLVE ONE TABLET UNDER THE TONGUE AS NEEDED FOR CHEST PAIN. YOU MAY REPEAT ONCE IF NEEDED AND GO TO THE E.R.     • Omega-3 Fatty Acids (FISH OIL) 1000 MG capsule capsule Take 1 capsule by mouth 3 (Three) Times a Day With Meals. 270 capsule 1     No current facility-administered medications for this visit.          Review of Systems     Constitution: Denies any fatigue, fever or chills    HENT: Vertigo with dizziness    Eyes: Denies any blurring of vision, or photophobia     Cardivascular - As per history of present illness     Respiratory system-denies any COPD, shortness of breath with NYHA class II symptoms        Endocrine:   history of hyperlipidemia, diabetes,                             Musculoskeletal:   history of arthritis with musculoskeletal problems    Gastrointestinal: No nausea, vomiting, or melena    Genitourinary: No dysuria or hematuria    Neurological:   Has peripheral neuropathy    Psychiatric/Behavioral:        No history of depression,  No history of bipolar disorder or schizophrenia     Hematological- no history of easy bruising or any bleeding diathesis            OBJECTIVE    /68  Pulse 70  Ht 62\" (157.5 cm)  Wt 171 lb (77.6 kg)  BMI 31.28 kg/m2      Physical Exam     Constitutional: is oriented to person, place, and time.     Skin-warm and dry    Well " developed and nourished in no acute distress      Head: Normocephalic and atraumatic.     Eyes: Pupils are equal, round, and reactive to light.     Neck: Neck supple. No bruit in the carotids, no elevation of JVD    Cardiovascular: [place] in the fifth intercostal space   Regular rate, and  Rhythm,    S1 greater than S2, no S3 or S4, no gallop     Pulmonary/Chest:   Air  Entry is equal on both sides  No wheezing or crackles,      Abdominal: Soft.  No hepatosplenomegaly, bowel sounds are present    Musculoskeletal: No kyphoscoliosis, no significant thickening of the joints    Neurological: is alert and oriented to person, place, and time.    cranial nerve are intact .   No motor or sensory deficit    Extremities-no edema, no radial femoral delay      Psychiatric: He has a normal mood and affect.                  His behavior is normal.           Procedures      A/P    CAD status post CABG with chronic stable angina-on maximum medical therapy possible, not on Ranexa due to high cost.  She uses nitroglycerin 0.4 mg when necessary for chest pain and that is helping her.    Hypertension well controlled with amlodipine, HCTZ, isosorbide and Toprol-XL.    Diabetes on metformin and last A1c was good.      Cannot tolerate statins or any cholesterol medicines even though her cholesterol is high.  Her LDL is 223, triglyceride is 342, HDL is 44 and total cholesterol is 235    GERD on Pepcid and she will follow with me in 8 months      Alfa Whalen MD  7/27/2017  10:51 AM    EMR Dragon/Transcription disclaimer:   Some of this note may be an electronic transcription/translation of spoken language to printed text. The electronic translation of spoken language may permit erroneous, or at times, nonsensical words or phrases to be inadvertently transcribed; Although I have reviewed the note for such errors, some may still exist.

## 2017-07-31 ENCOUNTER — OFFICE VISIT (OUTPATIENT)
Dept: OTOLARYNGOLOGY | Facility: CLINIC | Age: 82
End: 2017-07-31

## 2017-07-31 VITALS
HEIGHT: 62 IN | TEMPERATURE: 96.3 F | SYSTOLIC BLOOD PRESSURE: 132 MMHG | BODY MASS INDEX: 31.28 KG/M2 | WEIGHT: 170 LBS | DIASTOLIC BLOOD PRESSURE: 68 MMHG

## 2017-07-31 DIAGNOSIS — H81.12 BPPV (BENIGN PAROXYSMAL POSITIONAL VERTIGO), LEFT: Primary | ICD-10-CM

## 2017-07-31 PROCEDURE — 99203 OFFICE O/P NEW LOW 30 MIN: CPT | Performed by: OTOLARYNGOLOGY

## 2017-08-03 ENCOUNTER — CONSULT (OUTPATIENT)
Dept: PHYSICAL THERAPY | Facility: CLINIC | Age: 82
End: 2017-08-03

## 2017-08-03 DIAGNOSIS — H81.12 BPPV (BENIGN PAROXYSMAL POSITIONAL VERTIGO), LEFT: Primary | ICD-10-CM

## 2017-08-03 PROCEDURE — 95992 CANALITH REPOSITIONING PROC: CPT | Performed by: PHYSICAL THERAPIST

## 2017-08-03 PROCEDURE — 97161 PT EVAL LOW COMPLEX 20 MIN: CPT | Performed by: PHYSICAL THERAPIST

## 2017-08-03 PROCEDURE — G8978 MOBILITY CURRENT STATUS: HCPCS | Performed by: PHYSICAL THERAPIST

## 2017-08-03 PROCEDURE — G8979 MOBILITY GOAL STATUS: HCPCS | Performed by: PHYSICAL THERAPIST

## 2017-08-03 NOTE — PROGRESS NOTES
Outpatient Physical Therapy Ortho Initial Evaluation       Patient Name: Tatyana Yeager  : 1934  MRN: [id number]  Today's Date: 8/3/2017      Visit Date: 2017    TIME IN 1540    TIME OUT 1612    Patient Active Problem List   Diagnosis   • Diabetes mellitus without complication   • Keratitis sicca, bilateral   • Artificial lens present   • Peripheral arterial disease   • Coronary artery disease involving 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   • Essential hypertension   • Hyperlipidemia   • BPPV (benign paroxysmal positional vertigo)   • Drug-induced constipation   • DDD (degenerative disc disease), lumbar   • Chronic midline low back pain without sciatica        Past Medical History:   Diagnosis Date   • Acquired hypothyroidism    • Aortic valve stenosis    • Artificial lens present    • Borderline glaucoma    • Chronic depression    • Chronic pain    • Corneal foreign body     OS   • Coronary arteriosclerosis    • Cortical senile cataract    • Diabetes mellitus     NO RETINOPATHY   • Diverticular disease of colon    • Essential hypertension    • Generalized anxiety disorder    • GERD (gastroesophageal reflux disease)    • Histoplasmosis with retinitis    • History of bone density study 2009    DXA BONE DENSITY AXIAL 71087 (Normal for the hips and lumbar region. Hips represent 3.9% improvement.Lumbar represents 1.3 % improvement)   • History of echocardiogram 2015    Echocardiogram W/ color flow 65741 (Overall LV contractility normal with Ef of 55% with LVH and diastolic relaxation abnormality of the left ventricle.Moderate to severe AV stenosis.Mild mitral regurg.No intracardiac mass or thrombus.)   • History of mammogram 2009    benign findings    • Hyperlipidemia     not able to take statin     • Joint pain    • Low back pain    • Mammogram declined    • Nonexudative age-related macular degeneration     MILD   • Nuclear cataract    • Peripheral  vascular disease    • Posterior subcapsular polar senile cataract    • Primary open angle glaucoma    • Transient ischemic attack involving carotid artery     Carotid territory transient ischemic attack     • Transient visual loss    • Type 2 diabetes mellitus         Past Surgical History:   Procedure Laterality Date   • ANGIOPLASTY AORTIC  2013    Angioplasty, aorta (dilation) (stents of bilateral common iliacarteries)   2013 EMMANUEL STATON    • CATARACT EXTRACTION  12/19/2013    Remove cataract, insert lens (Left eye.)   • CATARACT EXTRACTION  09/20/2006    Remove cataract, insert lens (nuclear cataract, right)   • CATARACT EXTRACTION Bilateral    • COLONOSCOPY  04/25/2012    Colon endoscopy 37610 (Diverticulum in sigmoid colon. Internal & external hemorrhoids found.)   • COLONOSCOPY  05/12/2008    Colon endoscopy (Rectal bleeding. Ischemic colitis (watershed region) w.bleeding w/o infarction. Internal hemorrhoids w/o bleeding)   • CORONARY ARTERY BYPASS GRAFT  01/12/2005    X2   • CRYOTHERAPY  12/12/2011    Destruction of Benign Lesion (1-14) 94163 (Actinic keratosis)    • ENDOSCOPY  04/25/2012    EGD w/ tube 80354 (Normal esophagus. Gastritis in stoamch. Biopsy taken. Normal duodenum. Biopsy taken   • ENDOSCOPY  05/12/2008    EGD with tube (Gastroesophageal reflux w/o esophagitis. 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 BPPV (benign paroxysmal positional vertigo), left H81.12 386.11       Subjective Evaluation    History of Present Illness  Mechanism of injury: 81 yo female with 3 month h/o insiidous onset BPPB on left side, c/o dizzness worst with looking overhead and looking to the left while walking. Has had medication for dizziness which has been helpful this far at lessening dizziness.    Quality of life: good    Pain  Relieving factors: medications (laying in bed)  Aggravating factors: ambulation and standing (head turns to the left and looking up overhead)  Progression: improved    Social Support  Lives with: significant other    Hand dominance: right    Treatments  Current treatment: medication  Patient Goals  Patient goals for therapy: improved balance  Patient goal:   Goals (in 1 week):  1.Pt independent with application of home Epley maneuver.  2. Function (perform daily tasks) in her home without symptoms of vertigo.    Therapeutic ex 58954: Home Epley maneuver 9 minutes                                                        Assessment & Plan     Assessment  Impairments: abnormal gait, impaired balance and lacks appropriate home exercise program  Other impairment: no dizziness with eye movements with head and neck fixed in space, seated: looking up and left rotation provoked mild dizziness, bending over to touch toes while  seated with dizziness felt,  Assessment details: 81 yo female with acute onset left sided benign paroxysmal positional vertigo (BPPV). Patient presents with increased dizziness with left head turns and looking overhead, as well as transitional movements (supine <> sit). Patient had vertigo elicited (5 sec duration) with assessment of Home Epley maneuver. No change in vertigo symptoms after the assessment.  Prognosis: fair    Goals    Goals (in 1 week):  1.Pt independent with application of home Epley maneuver.  2. Function  (perform daily tasks) in her home without symptoms of vertigo.    Plan  Therapy options: will be seen for skilled physical therapy services  Planned therapy interventions: home exercise program  Other planned therapy interventions: vestibular training: Home Epley maneuver vs. Vazquez Daroff canalith repositioning maneuver  Frequency: 1x week  Duration in visits: 2  Treatment plan discussed with: patient  Plan details: 1 follow up session to assess application and efficacy of pt's home exercise program with the Home Epley maneuver.  Functional Limitations: walking, moving in bed and standing      Time Calculation: 32                      Jameson Rowley PT  8/3/2017        Taytana Felipe Revlett    1934

## 2017-08-10 ENCOUNTER — TREATMENT (OUTPATIENT)
Dept: PHYSICAL THERAPY | Facility: CLINIC | Age: 82
End: 2017-08-10

## 2017-08-10 DIAGNOSIS — H81.12 BPPV (BENIGN PAROXYSMAL POSITIONAL VERTIGO), LEFT: Primary | ICD-10-CM

## 2017-08-10 PROCEDURE — 95992 CANALITH REPOSITIONING PROC: CPT | Performed by: PHYSICAL THERAPIST

## 2017-08-15 DIAGNOSIS — K59.03 DRUG-INDUCED CONSTIPATION: ICD-10-CM

## 2017-08-16 ENCOUNTER — LAB (OUTPATIENT)
Dept: LAB | Facility: OTHER | Age: 82
End: 2017-08-16

## 2017-08-16 ENCOUNTER — OFFICE VISIT (OUTPATIENT)
Dept: FAMILY MEDICINE CLINIC | Facility: CLINIC | Age: 82
End: 2017-08-16

## 2017-08-16 VITALS
SYSTOLIC BLOOD PRESSURE: 130 MMHG | BODY MASS INDEX: 32.02 KG/M2 | WEIGHT: 174 LBS | TEMPERATURE: 97.2 F | HEIGHT: 62 IN | HEART RATE: 72 BPM | DIASTOLIC BLOOD PRESSURE: 68 MMHG | OXYGEN SATURATION: 98 %

## 2017-08-16 DIAGNOSIS — E11.9 DIABETES MELLITUS WITHOUT COMPLICATION (HCC): ICD-10-CM

## 2017-08-16 DIAGNOSIS — I10 ESSENTIAL HYPERTENSION: ICD-10-CM

## 2017-08-16 DIAGNOSIS — K59.03 DRUG-INDUCED CONSTIPATION: ICD-10-CM

## 2017-08-16 DIAGNOSIS — G89.29 CHRONIC MIDLINE LOW BACK PAIN WITHOUT SCIATICA: ICD-10-CM

## 2017-08-16 DIAGNOSIS — Z00.00 MEDICARE ANNUAL WELLNESS VISIT, INITIAL: Primary | ICD-10-CM

## 2017-08-16 DIAGNOSIS — E66.09 NON MORBID OBESITY DUE TO EXCESS CALORIES: ICD-10-CM

## 2017-08-16 DIAGNOSIS — M51.36 DDD (DEGENERATIVE DISC DISEASE), LUMBAR: ICD-10-CM

## 2017-08-16 DIAGNOSIS — M54.50 CHRONIC MIDLINE LOW BACK PAIN WITHOUT SCIATICA: ICD-10-CM

## 2017-08-16 LAB
ANION GAP SERPL CALCULATED.3IONS-SCNC: 14 MMOL/L (ref 5–15)
BUN BLD-MCNC: 22 MG/DL (ref 8–25)
BUN/CREAT SERPL: 16.9 (ref 7–25)
CALCIUM SPEC-SCNC: 9.2 MG/DL (ref 8.4–10.8)
CHLORIDE SERPL-SCNC: 102 MMOL/L (ref 100–112)
CO2 SERPL-SCNC: 23 MMOL/L (ref 20–32)
CREAT BLD-MCNC: 1.3 MG/DL (ref 0.4–1.3)
GFR SERPL CREATININE-BSD FRML MDRD: 39 ML/MIN/1.73 (ref 39–90)
GLUCOSE BLD-MCNC: 155 MG/DL (ref 70–100)
POTASSIUM BLD-SCNC: 4.5 MMOL/L (ref 3.4–5.4)
SODIUM BLD-SCNC: 139 MMOL/L (ref 134–146)

## 2017-08-16 PROCEDURE — 99214 OFFICE O/P EST MOD 30 MIN: CPT | Performed by: FAMILY MEDICINE

## 2017-08-16 PROCEDURE — 36415 COLL VENOUS BLD VENIPUNCTURE: CPT | Performed by: FAMILY MEDICINE

## 2017-08-16 PROCEDURE — G0438 PPPS, INITIAL VISIT: HCPCS | Performed by: FAMILY MEDICINE

## 2017-08-16 PROCEDURE — 80048 BASIC METABOLIC PNL TOTAL CA: CPT | Performed by: FAMILY MEDICINE

## 2017-08-16 RX ORDER — GLIPIZIDE 5 MG/1
5 TABLET ORAL DAILY
Qty: 90 TABLET | Refills: 1 | Status: SHIPPED | OUTPATIENT
Start: 2017-08-16 | End: 2017-09-20 | Stop reason: SDUPTHER

## 2017-08-16 RX ORDER — HYDROCODONE BITARTRATE AND ACETAMINOPHEN 5; 325 MG/1; MG/1
1 TABLET ORAL 2 TIMES DAILY PRN
Qty: 60 TABLET | Refills: 0 | Status: SHIPPED | OUTPATIENT
Start: 2017-08-16 | End: 2017-09-22 | Stop reason: SDUPTHER

## 2017-08-16 NOTE — PROGRESS NOTES
QUICK REFERENCE INFORMATION:  The ABCs of the Annual Wellness Visit    Initial Medicare Wellness Visit    HEALTH RISK ASSESSMENT    1934    Recent Hospitalizations:  No hospitalization(s) within the last year..    Current Medical Providers:  Patient Care Team:  Sri Mon MD as PCP - General (Family Medicine)        Smoking Status:  History   Smoking Status   • Former Smoker   • Packs/day: 1.00   • Years: 14.00   Smokeless Tobacco   • Never Used     Comment: QUIT AT AGE 35       Alcohol Consumption:  History   Alcohol Use No       Depression Screen:   PHQ-9 Depression Screening 8/16/2017   Little interest or pleasure in doing things 3   Feeling down, depressed, or hopeless 0   Trouble falling or staying asleep, or sleeping too much 0   Feeling tired or having little energy 3   Poor appetite or overeating 0   Feeling bad about yourself - or that you are a failure or have let yourself or your family down 0   Trouble concentrating on things, such as reading the newspaper or watching television 0   Moving or speaking so slowly that other people could have noticed. Or the opposite - being so fidgety or restless that you have been moving around a lot more than usual 0   Thoughts that you would be better off dead, or of hurting yourself in some way 0   PHQ-9 Total Score 6   If you checked off any problems, how difficult have these problems made it for you to do your work, take care of things at home, or get along with other people? Somewhat difficult       Health Habits and Functional and Cognitive Screening:  Functional & Cognitive Status 8/16/2017   Do you have difficulty preparing food and eating? No   Do you have difficulty bathing yourself? No   Do you have difficulty getting dressed? No   Do you have difficulty using the toilet? No   Do you have difficulty moving around from place to place? No   In the past year have you fallen or experienced a near fall? Yes   Do you need help using the phone?  No    Are you deaf or do you have serious difficulty hearing?  No   Do you need help with transportation? Yes   Do you need help shopping? No   Do you need help preparing meals?  No   Do you need help with housework?  No   Do you need help with laundry? No   Do you need help taking your medications? No   Do you need help managing money? No   Do you have difficulty concentrating, remembering or making decisions? No     Health Habits  Current Diet: Well Balanced Diet  Dental Exam: Not up to date  Eye Exam: Up to date  Exercise (times per week): 0 times per week    Does the patient have evidence of cognitive impairment? No    Asiprin use counseling: On clopidrogel as an alternative (due to ASA contraindication)      Recent Lab Results:    Visual Acuity:  No exam data present    Age-appropriate Screening Schedule:  Refer to the list below for future screening recommendations based on patient's age, sex and/or medical conditions. Orders for these recommended tests are listed in the plan section. The patient has been provided with a written plan.    Health Maintenance   Topic Date Due   • INFLUENZA VACCINE  09/01/2017   • DIABETIC EYE EXAM  10/12/2017   • HEMOGLOBIN A1C  11/09/2017   • LIPID PANEL  05/09/2018   • PNEUMOCOCCAL VACCINES (65+ LOW/MEDIUM RISK) (2 of 2 - PPSV23) 06/21/2018   • DIABETIC FOOT EXAM  07/21/2018   • TDAP/TD VACCINES (2 - Td) 06/21/2027   • ZOSTER VACCINE  Addressed        Subjective   History of Present Illness    Tatyana Yeager is a 82 y.o. female who presents for an Annual Wellness Visit.    The following portions of the patient's history were reviewed and updated as appropriate: allergies, current medications, past family history, past medical history, past social history, past surgical history and problem list.    Outpatient Medications Prior to Visit   Medication Sig Dispense Refill   • ALPRAZolam (XANAX) 1 MG tablet Take 1 tablet by mouth At Night As Needed for Anxiety. 90 tablet 0   •  amLODIPine (NORVASC) 5 MG tablet Take 1 tablet by mouth Daily. 90 tablet 1   • clopidogrel (PLAVIX) 75 MG tablet Take 1 tablet by mouth Daily. 90 tablet 1   • doxepin (SINEquan) 100 MG capsule Take 1 capsule by mouth Every Night. 90 capsule 1   • famotidine (PEPCID) 20 MG tablet Take 1 tablet by mouth 2 (Two) Times a Day. 180 tablet 1   • hydrochlorothiazide (HYDRODIURIL) 25 MG tablet Take 1 tablet by mouth Daily. 90 tablet 1   • isosorbide mononitrate (IMDUR) 30 MG 24 hr tablet Take 1 tablet by mouth Daily. 90 tablet 1   • levothyroxine (SYNTHROID, LEVOTHROID) 88 MCG tablet Take 1 tablet by mouth Daily. 90 tablet 1   • linaclotide (LINZESS) 72 MCG capsule capsule Take 1 capsule by mouth Every Other Day. 90 capsule 0   • lisinopril (PRINIVIL,ZESTRIL) 2.5 MG tablet Take 1 tablet by mouth Daily. 90 tablet 0   • meclizine (ANTIVERT) 12.5 MG tablet Take 1 tablet by mouth 2 (Two) Times a Day As Needed for dizziness. 180 tablet 0   • metoprolol succinate XL (TOPROL-XL) 50 MG 24 hr tablet Take 1 tablet by mouth Daily. 90 tablet 1   • nitroglycerin (NITROSTAT) 0.4 MG SL tablet Place 0.4 mg under the tongue. DISSOLVE ONE TABLET UNDER THE TONGUE AS NEEDED FOR CHEST PAIN. YOU MAY REPEAT ONCE IF NEEDED AND GO TO THE E.R.     • Omega-3 Fatty Acids (FISH OIL) 1000 MG capsule capsule Take 1 capsule by mouth 3 (Three) Times a Day With Meals. 270 capsule 1   • glipiZIDE (GLUCOTROL) 5 MG tablet Take 1 tablet by mouth 2 (Two) Times a Day Before Meals. 180 tablet 0   • HYDROcodone-acetaminophen (NORCO) 5-325 MG per tablet Take 1 tablet by mouth 2 (Two) Times a Day As Needed for Moderate Pain (4-6). 60 tablet 0     No facility-administered medications prior to visit.        Patient Active Problem List   Diagnosis   • Diabetes mellitus without complication   • Keratitis sicca, bilateral   • Artificial lens present   • Peripheral arterial disease   • Coronary artery disease involving coronary bypass graft of native heart with angina  "pectoris   • Essential hypertension   • Hyperlipidemia   • BPPV (benign paroxysmal positional vertigo)   • Drug-induced constipation   • DDD (degenerative disc disease), lumbar   • Chronic midline low back pain without sciatica   • Medicare annual wellness visit, initial       Advance Care Planning:  has NO advance directive - information provided to the patient today    Identification of Risk Factors:  Risk factors include: weight , unhealthy diet, cardiovascular risk, chronic pain, lack of transportation and financial stress.    Review of Systems    Compared to one year ago, the patient feels her physical health is the same.  Compared to one year ago, the patient feels her mental health is the same.    Objective     Physical Exam    Vitals:    08/16/17 1326   BP: 130/68   Pulse: 72   Temp: 97.2 °F (36.2 °C)   SpO2: 98%   Weight: 174 lb (78.9 kg)   Height: 62\" (157.5 cm)       Body mass index is 31.83 kg/(m^2).  Discussed the patient's BMI with her. The BMI is above average; BMI management plan is completed.    Assessment/Plan   Patient Self-Management and Personalized Health Advice  The patient has been provided with information about: diet, exercise, weight management, prevention of cardiac or vascular disease, the relationship between weight and GERD, fall prevention and designing advance directives and preventive services including:   · Advance directive, Exercise counseling provided, Fall Risk assessment done, Fall Risk plan of care done, Nutrition counseling provided.    Visit Diagnoses:    ICD-10-CM ICD-9-CM   1. Medicare annual wellness visit, initial Z00.00 V70.0   2. Drug-induced constipation K59.03 564.09     E980.5   3. Diabetes mellitus without complication E11.9 250.00   4. Chronic midline low back pain without sciatica M54.5 724.2    G89.29 338.29   5. DDD (degenerative disc disease), lumbar M51.36 722.52   6. Non morbid obesity due to excess calories E66.09 278.00   7. Essential hypertension I10 " 401.9       No orders of the defined types were placed in this encounter.      Outpatient Encounter Prescriptions as of 8/16/2017   Medication Sig Dispense Refill   • ALPRAZolam (XANAX) 1 MG tablet Take 1 tablet by mouth At Night As Needed for Anxiety. 90 tablet 0   • amLODIPine (NORVASC) 5 MG tablet Take 1 tablet by mouth Daily. 90 tablet 1   • clopidogrel (PLAVIX) 75 MG tablet Take 1 tablet by mouth Daily. 90 tablet 1   • doxepin (SINEquan) 100 MG capsule Take 1 capsule by mouth Every Night. 90 capsule 1   • famotidine (PEPCID) 20 MG tablet Take 1 tablet by mouth 2 (Two) Times a Day. 180 tablet 1   • glipiZIDE (GLUCOTROL) 5 MG tablet Take 1 tablet by mouth Daily. 90 tablet 1   • hydrochlorothiazide (HYDRODIURIL) 25 MG tablet Take 1 tablet by mouth Daily. 90 tablet 1   • HYDROcodone-acetaminophen (NORCO) 5-325 MG per tablet Take 1 tablet by mouth 2 (Two) Times a Day As Needed for Moderate Pain . 60 tablet 0   • isosorbide mononitrate (IMDUR) 30 MG 24 hr tablet Take 1 tablet by mouth Daily. 90 tablet 1   • levothyroxine (SYNTHROID, LEVOTHROID) 88 MCG tablet Take 1 tablet by mouth Daily. 90 tablet 1   • linaclotide (LINZESS) 72 MCG capsule capsule Take 1 capsule by mouth Every Other Day. 90 capsule 0   • lisinopril (PRINIVIL,ZESTRIL) 2.5 MG tablet Take 1 tablet by mouth Daily. 90 tablet 0   • meclizine (ANTIVERT) 12.5 MG tablet Take 1 tablet by mouth 2 (Two) Times a Day As Needed for dizziness. 180 tablet 0   • metoprolol succinate XL (TOPROL-XL) 50 MG 24 hr tablet Take 1 tablet by mouth Daily. 90 tablet 1   • nitroglycerin (NITROSTAT) 0.4 MG SL tablet Place 0.4 mg under the tongue. DISSOLVE ONE TABLET UNDER THE TONGUE AS NEEDED FOR CHEST PAIN. YOU MAY REPEAT ONCE IF NEEDED AND GO TO THE E.R.     • Omega-3 Fatty Acids (FISH OIL) 1000 MG capsule capsule Take 1 capsule by mouth 3 (Three) Times a Day With Meals. 270 capsule 1   • [DISCONTINUED] glipiZIDE (GLUCOTROL) 5 MG tablet Take 1 tablet by mouth 2 (Two) Times a Day  Before Meals. 180 tablet 0   • [DISCONTINUED] HYDROcodone-acetaminophen (NORCO) 5-325 MG per tablet Take 1 tablet by mouth 2 (Two) Times a Day As Needed for Moderate Pain (4-6). 60 tablet 0     No facility-administered encounter medications on file as of 8/16/2017.        Reviewed use of high risk medication in the elderly: yes  Reviewed for potential of harmful drug interactions in the elderly: yes    Follow Up:  Return in about 3 months (around 11/16/2017), or if symptoms worsen or fail to improve.     An After Visit Summary and PPPS with all of these plans were given to the patient.

## 2017-08-16 NOTE — PATIENT INSTRUCTIONS
Advance Directive  Advance directives are the legal documents that allow you to make choices about your health care and medical treatment if you cannot speak for yourself. Advance directives are a way for you to communicate your wishes to family, friends, and health care providers. The specified people can then convey your decisions about end-of-life care to avoid confusion if you should become unable to communicate.  Ideally, the process of discussing and writing advance directives should happen over time rather than making decisions all at once. Advance directives can be modified as your situation changes, and you can change your mind at any time, even after you have signed the advance directives. Each state has its own laws regarding advance directives.  You may want to check with your health care provider, , or state representative about the law in your state. Below are some examples of advance directives.  HEALTH CARE PROXY AND DURABLE POWER OF  FOR HEALTH CARE  A health care proxy is a person (agent) appointed to make medical decisions for you if you cannot. Generally, people choose someone they know well and trust to represent their preferences when they can no longer do so. You should be sure to ask this person for agreement to act as your agent. An agent may have to exercise judgment in the event of a medical decision for which your wishes are not known.  A durable power of  for health care is a legal document that names your health care proxy. Depending on the laws in your state, after the document is written, it may also need to be:  · Signed.  · Notarized.  · Dated.  · Copied.  · Witnessed.  · Incorporated into your medical record.  You may also want to appoint someone to manage your financial affairs if you cannot. This is called a durable power of  for finances. It is a separate legal document from the durable power of  for health care. You may choose the same  person or someone different from your health care proxy to act as your agent in financial matters.  LIVING WILL  A living will is a set of instructions documenting your wishes about medical care when you cannot care for yourself. It is used if you become:  · Terminally ill.  · Incapacitated.  · Unable to communicate.  · Unable to make decisions.  Items to consider in your living will include:  · The use or non-use of life-sustaining equipment, such as dialysis machines and breathing machines (ventilators).  · A do not resuscitate (DNR) order, which is the instruction not to use cardiopulmonary resuscitation (CPR) if breathing or heartbeat stops.  · Tube feeding.  · Withholding of food and fluids.  · Comfort (palliative) care when the goal becomes comfort rather than a cure.  · Organ and tissue donation.  A living will does not give instructions about distribution of your money and property if you should pass away. It is advisable to seek the expert advice of a  in drawing up a will regarding your possessions. Decisions about taxes, beneficiaries, and asset distribution will be legally binding. This process can relieve your family and friends of any burdens surrounding disputes or questions that may come up about the allocation of your assets.  DO NOT RESUSCITATE (DNR)  A do not resuscitate (DNR) order is a request to not have CPR in the event that your heart stops beating or you stop breathing. Unless given other instructions, a health care provider will try to help any patient whose heart has stopped or who has stopped breathing.      This information is not intended to replace advice given to you by your health care provider. Make sure you discuss any questions you have with your health care provider.     Document Released: 03/26/2009 Document Revised: 04/10/2017 Document Reviewed: 05/07/2014  [employer] Interactive Patient Education ©2017 [employer]  Exercising to Lose Weight  Exercising can help you to  lose weight. In order to lose weight through exercise, you need to do vigorous-intensity exercise. You can tell that you are exercising with vigorous intensity if you are breathing very hard and fast and cannot hold a conversation while exercising.  Moderate-intensity exercise helps to maintain your current weight. You can tell that you are exercising at a moderate level if you have a higher heart rate and faster breathing, but you are still able to hold a conversation.  HOW OFTEN SHOULD I EXERCISE?  Choose an activity that you enjoy and set realistic goals. Your health care provider can help you to make an activity plan that works for you. Exercise regularly as directed by your health care provider. This may include:  · Doing resistance training twice each week, such as:    Push-ups.    Sit-ups.    Lifting weights.    Using resistance bands.  · Doing a given intensity of exercise for a given amount of time. Choose from these options:    150 minutes of moderate-intensity exercise every week.    75 minutes of vigorous-intensity exercise every week.    A mix of moderate-intensity and vigorous-intensity exercise every week.  Children, pregnant women, people who are out of shape, people who are overweight, and older adults may need to consult a health care provider for individual recommendations. If you have any sort of medical condition, be sure to consult your health care provider before starting a new exercise program.  WHAT ARE SOME ACTIVITIES THAT CAN HELP ME TO LOSE WEIGHT?   · Walking at a rate of at least 4.5 miles an hour.  · Jogging or running at a rate of 5 miles per hour.  · Biking at a rate of at least 10 miles per hour.  · Lap swimming.  · Roller-skating or in-line skating.  · Cross-country skiing.  · Vigorous competitive sports, such as football, basketball, and soccer.  · Jumping rope.  · Aerobic dancing.  HOW CAN I BE MORE ACTIVE IN MY DAY-TO-DAY ACTIVITIES?  · Use the stairs instead of the  elevator.  · Take a walk during your lunch break.  · If you drive, park your car farther away from work or school.  · If you take public transportation, get off one stop early and walk the rest of the way.  · Make all of your phone calls while standing up and walking around.  · Get up, stretch, and walk around every 30 minutes throughout the day.  WHAT GUIDELINES SHOULD I FOLLOW WHILE EXERCISING?  · Do not exercise so much that you hurt yourself, feel dizzy, or get very short of breath.  · Consult your health care provider prior to starting a new exercise program.  · Wear comfortable clothes and shoes with good support.  · Drink plenty of water while you exercise to prevent dehydration or heat stroke. Body water is lost during exercise and must be replaced.  · Work out until you breathe faster and your heart beats faster.     This information is not intended to replace advice given to you by your health care provider. Make sure you discuss any questions you have with your health care provider.     Document Released: 01/20/2012 Document Revised: 01/08/2016 Document Reviewed: 05/21/2015  [employer] Interactive Patient Education ©2017 [employer] | [employer]  Calorie Counting for Weight Loss  Calories are energy you get from the things you eat and drink. Your body uses this energy to keep you going throughout the day. The number of calories you eat affects your weight. When you eat more calories than your body needs, your body stores the extra calories as fat. When you eat fewer calories than your body needs, your body burns fat to get the energy it needs.  Calorie counting means keeping track of how many calories you eat and drink each day. If you make sure to eat fewer calories than your body needs, you should lose weight. In order for calorie counting to work, you will need to eat the number of calories that are right for you in a day to lose a healthy amount of weight per week. A healthy amount of weight to lose per week is  usually 1-2 lb (0.5-0.9 kg). A dietitian can determine how many calories you need in a day and give you suggestions on how to reach your calorie goal.   WHAT IS MY MY PLAN?  My goal is to have __________ calories per day.   If I have this many calories per day, I should lose around __________ pounds per week.  WHAT DO I NEED TO KNOW ABOUT CALORIE COUNTING?  In order to meet your daily calorie goal, you will need to:  · Find out how many calories are in each food you would like to eat. Try to do this before you eat.  · Decide how much of the food you can eat.  · Write down what you ate and how many calories it had. Doing this is called keeping a food log.  WHERE DO I FIND CALORIE INFORMATION?  The number of calories in a food can be found on a Nutrition Facts label. Note that all the information on a label is based on a specific serving of the food. If a food does not have a Nutrition Facts label, try to look up the calories online or ask your dietitian for help.  HOW DO I DECIDE HOW MUCH TO EAT?  To decide how much of the food you can eat, you will need to consider both the number of calories in one serving and the size of one serving. This information can be found on the Nutrition Facts label. If a food does not have a Nutrition Facts label, look up the information online or ask your dietitian for help.  Remember that calories are listed per serving. If you choose to have more than one serving of a food, you will have to multiply the calories per serving by the amount of servings you plan to eat. For example, the label on a package of bread might say that a serving size is 1 slice and that there are 90 calories in a serving. If you eat 1 slice, you will have eaten 90 calories. If you eat 2 slices, you will have eaten 180 calories.  HOW DO I KEEP A FOOD LOG?  After each meal, record the following information in your food log:  · What you ate.  · How much of it you ate.  · How many calories it had.  · Then, add up  your calories.  Keep your food log near you, such as in a small notebook in your pocket. Another option is to use a mobile anthony or website. Some programs will calculate calories for you and show you how many calories you have left each time you add an item to the log.  WHAT ARE SOME CALORIE COUNTING TIPS?  · Use your calories on foods and drinks that will fill you up and not leave you hungry. Some examples of this include foods like nuts and nut butters, vegetables, lean proteins, and high-fiber foods (more than 5 g fiber per serving).  · Eat nutritious foods and avoid empty calories. Empty calories are calories you get from foods or beverages that do not have many nutrients, such as candy and soda. It is better to have a nutritious high-calorie food (such as an avocado) than a food with few nutrients (such as a bag of chips).  · Know how many calories are in the foods you eat most often. This way, you do not have to look up how many calories they have each time you eat them.  · Look out for foods that may seem like low-calorie foods but are really high-calorie foods, such as baked goods, soda, and fat-free candy.  · Pay attention to calories in drinks. Drinks such as sodas, specialty coffee drinks, alcohol, and juices have a lot of calories yet do not fill you up. Choose low-calorie drinks like water and diet drinks.  · Focus your calorie counting efforts on higher calorie items. Logging the calories in a garden salad that contains only vegetables is less important than calculating the calories in a milk shake.  · Find a way of tracking calories that works for you. Get creative. Most people who are successful find ways to keep track of how much they eat in a day, even if they do not count every calorie.  WHAT ARE SOME PORTION CONTROL TIPS?  · Know how many calories are in a serving. This will help you know how many servings of a certain food you can have.  · Use a measuring cup to measure serving sizes. This is  "helpful when you start out. With time, you will be able to estimate serving sizes for some foods.  · Take some time to put servings of different foods on your favorite plates, bowls, and cups so you know what a serving looks like.  · Try not to eat straight from a bag or box. Doing this can lead to overeating. Put the amount you would like to eat in a cup or on a plate to make sure you are eating the right portion.  · Use smaller plates, glasses, and bowls to prevent overeating. This is a quick and easy way to practice portion control. If your plate is smaller, less food can fit on it.  · Try not to multitask while eating, such as watching TV or using your computer. If it is time to eat, sit down at a table and enjoy your food. Doing this will help you to start recognizing when you are full. It will also make you more aware of what and how much you are eating.  HOW CAN I CALORIE COUNT WHEN EATING OUT?  · Ask for smaller portion sizes or child-sized portions.  · Consider sharing an entree and sides instead of getting your own entree.  · If you get your own entree, eat only half. Ask for a box at the beginning of your meal and put the rest of your entree in it so you are not tempted to eat it.  · Look for the calories on the menu. If calories are listed, choose the lower calorie options.  · Choose dishes that include vegetables, fruits, whole grains, low-fat dairy products, and lean protein. Focusing on smart food choices from each of the 5 food groups can help you stay on track at restaurants.  · Choose items that are boiled, broiled, grilled, or steamed.  · Choose water, milk, unsweetened iced tea, or other drinks without added sugars. If you want an alcoholic beverage, choose a lower calorie option. For example, a regular kory can have up to 700 calories and a glass of wine has around 150.  · Stay away from items that are buttered, battered, fried, or served with cream sauce. Items labeled \"crispy\" are usually " fried, unless stated otherwise.  · Ask for dressings, sauces, and syrups on the side. These are usually very high in calories, so do not eat much of them.  · Watch out for salads. Many people think salads are a healthy option, but this is often not the case. Many salads come with garcia, fried chicken, lots of cheese, fried chips, and dressing. All of these items have a lot of calories. If you want a salad, choose a garden salad and ask for grilled meats or steak. Ask for the dressing on the side, or ask for olive oil and vinegar or lemon to use as dressing.  · Estimate how many servings of a food you are given. For example, a serving of cooked rice is ½ cup or about the size of half a tennis ball or one cupcake wrapper. Knowing serving sizes will help you be aware of how much food you are eating at restaurants. The list below tells you how big or small some common portion sizes are based on everyday objects.    1 oz--4 stacked dice.    3 oz--1 deck of cards.    1 tsp--1 dice.    1 Tbsp--½ a Ping-Pong ball.    2 Tbsp--1 Ping-Pong ball.    ½ cup--1 tennis ball or 1 cupcake wrapper.    1 cup--1 baseball.     This information is not intended to replace advice given to you by your health care provider. Make sure you discuss any questions you have with your health care provider.     Document Released: 2006 Document Revised: 2016 Document Reviewed: 10/23/2014  [email or web address] Interactive Patient Education © [employer]    Medicare Wellness  Personal Prevention Plan of Service     Date of Office Visit:  2017  Encounter Provider:  Sri Mon MD  Place of Service:  [facility]  Patient Name: Tatyana Yeager  :  1934    As part of the Medicare Wellness portion of your visit today, we are providing you with this personalized preventive plan of services (PPPS). This plan is based upon recommendations of the [place] Preventive Services Task Force  (USPSTF) and the Advisory Committee on Immunization Practices (ACIP).    This lists the preventive care services that should be considered, and provides dates of when you are due. Items listed as completed are up-to-date and do not require any further intervention.    Health Maintenance   Topic Date Due   • INFLUENZA VACCINE  09/01/2017   • DIABETIC EYE EXAM  10/12/2017   • HEMOGLOBIN A1C  11/09/2017   • LIPID PANEL  05/09/2018   • PNEUMOCOCCAL VACCINES (65+ LOW/MEDIUM RISK) (2 of 2 - PPSV23) 06/21/2018   • DIABETIC FOOT EXAM  07/21/2018   • MEDICARE ANNUAL WELLNESS  08/16/2018   • TDAP/TD VACCINES (2 - Td) 06/21/2027   • ZOSTER VACCINE  Addressed       No orders of the defined types were placed in this encounter.      No Follow-up on file.

## 2017-08-17 PROBLEM — Z00.00 MEDICARE ANNUAL WELLNESS VISIT, INITIAL: Status: ACTIVE | Noted: 2017-08-17

## 2017-08-17 NOTE — PROGRESS NOTES
Subjective   Chief Complaint   Patient presents with   • medicare wellness   • Med Refill     hydrocodone last dose this morning     Tatyana Yeager is a 82 y.o. female.   medicare wellness and Med Refill (hydrocodone last dose this morning)    History of Present Illness     [facility] - chronic back pain, insomnia, gerd, htn, hypothyroidism, diabetes    Chronic back pain with DDD - controlled with norco  No reported side effects with medication  Previously followed by Dr Goodwin  Presenting today for a refill    Htn, PVD, VAMSI, stents placed, AS, MR, CABG in 2005 -   controlled with imdur, metoprolol, lisinopril, norvasc, hctz  Anticoagulated with plavix  Followed by Dr Alfa Montiel scheduled    Diabetes - controlled with glipizide  Stopped metformin due to elevated serum creatinine  Last bmp checked in May -  Cr was 1.3  Fasting blood glucose this morning was 94  Average reading is 100  Requesting diabetic shoe prescription    Anxiety - controlled with xanax PRN    Vertigo - improved with meclizine  ENT consultation done  Currently following with PT    constipation - doing well with linzess  Can only take this medication every other day  Having trouble with the prescription getting filled, requesting a refill to mail delivery    The following portions of the patient's history were reviewed and updated as appropriate: allergies, current medications, past family history, past medical history, past social history, past surgical history and problem list.    Review of Systems   Constitutional: Negative for appetite change, chills, fatigue and fever.   HENT: Negative for congestion, ear pain, rhinorrhea and sore throat.    Eyes: Negative for pain.   Respiratory: Negative for cough and shortness of breath.    Cardiovascular: Negative for chest pain and palpitations.   Gastrointestinal: Negative for abdominal pain, constipation, diarrhea and nausea.   Genitourinary: Negative for dysuria.   Musculoskeletal: Positive for back  "pain. Negative for joint swelling and neck pain.   Skin: Negative for rash.   Neurological: Negative for dizziness and headaches.       Objective   /68  Pulse 72  Temp 97.2 °F (36.2 °C)  Ht 62\" (157.5 cm)  Wt 174 lb (78.9 kg)  SpO2 98%  BMI 31.83 kg/m2  Physical Exam   Constitutional: She is oriented to person, place, and time. She appears well-developed and well-nourished.   HENT:   Head: Normocephalic and atraumatic.   Eyes: Pupils are equal, round, and reactive to light.   Neck: Normal range of motion. Neck supple.   Cardiovascular: Normal rate, regular rhythm and normal heart sounds.    Pulmonary/Chest: Effort normal and breath sounds normal. No respiratory distress. She has no wheezes. She has no rales.   Abdominal: Soft. Bowel sounds are normal.   Musculoskeletal:        Lumbar back: She exhibits decreased range of motion and pain.   Neurological: She is alert and oriented to person, place, and time.   Skin: Skin is warm and dry.   Psychiatric: She has a normal mood and affect.   Nursing note and vitals reviewed.      Assessment/Plan   Problems Addressed this Visit        Cardiovascular and Mediastinum    Essential hypertension       Digestive    Drug-induced constipation       Endocrine    Diabetes mellitus without complication    Relevant Medications    glipiZIDE (GLUCOTROL) 5 MG tablet       Musculoskeletal and Integument    DDD (degenerative disc disease), lumbar       Other    Chronic midline low back pain without sciatica    Medicare annual wellness visit, initial - Primary      Other Visit Diagnoses     Non morbid obesity due to excess calories            Script for diabetic shoes    Reviewed lab results with patient  Ordered lab before next visit    Refilled norco  meliza and controlled contract on file    Adjusted glipizide    Refilled linzess    Medicare wellness visit done today    Discussed flu vaccine - patient declined    Recheck in 3 months or sooner as needed         "

## 2017-08-18 ENCOUNTER — TELEPHONE (OUTPATIENT)
Dept: FAMILY MEDICINE CLINIC | Facility: CLINIC | Age: 82
End: 2017-08-18

## 2017-08-18 NOTE — TELEPHONE ENCOUNTER
----- Message from Sri Mon MD sent at 8/17/2017  8:59 AM CDT -----  Electrolytes normal.  Glucose was elevated - but was not done fasting.  Creatinine stable at 1.3.

## 2017-09-20 ENCOUNTER — OFFICE VISIT (OUTPATIENT)
Dept: FAMILY MEDICINE CLINIC | Facility: CLINIC | Age: 82
End: 2017-09-20

## 2017-09-20 VITALS
DIASTOLIC BLOOD PRESSURE: 64 MMHG | BODY MASS INDEX: 32.57 KG/M2 | RESPIRATION RATE: 16 BRPM | TEMPERATURE: 98.5 F | HEART RATE: 76 BPM | SYSTOLIC BLOOD PRESSURE: 122 MMHG | HEIGHT: 62 IN | WEIGHT: 177 LBS | OXYGEN SATURATION: 98 %

## 2017-09-20 DIAGNOSIS — H66.001 ACUTE SUPPURATIVE OTITIS MEDIA OF RIGHT EAR WITHOUT SPONTANEOUS RUPTURE OF TYMPANIC MEMBRANE, RECURRENCE NOT SPECIFIED: ICD-10-CM

## 2017-09-20 DIAGNOSIS — M25.511 ACUTE PAIN OF RIGHT SHOULDER: ICD-10-CM

## 2017-09-20 DIAGNOSIS — J06.9 VIRAL URI: Primary | ICD-10-CM

## 2017-09-20 PROCEDURE — 96372 THER/PROPH/DIAG INJ SC/IM: CPT | Performed by: NURSE PRACTITIONER

## 2017-09-20 PROCEDURE — 99214 OFFICE O/P EST MOD 30 MIN: CPT | Performed by: NURSE PRACTITIONER

## 2017-09-20 RX ORDER — AMOXICILLIN AND CLAVULANATE POTASSIUM 875; 125 MG/1; MG/1
1 TABLET, FILM COATED ORAL 2 TIMES DAILY
Qty: 20 TABLET | Refills: 0 | Status: SHIPPED | OUTPATIENT
Start: 2017-09-20 | End: 2017-11-15

## 2017-09-20 RX ORDER — LIDOCAINE 50 MG/G
OINTMENT TOPICAL 3 TIMES DAILY PRN
Qty: 30 G | Refills: 0 | Status: SHIPPED | OUTPATIENT
Start: 2017-09-20 | End: 2017-11-15

## 2017-09-20 RX ORDER — TRIAMCINOLONE ACETONIDE 40 MG/ML
60 INJECTION, SUSPENSION INTRA-ARTICULAR; INTRAMUSCULAR ONCE
Status: DISCONTINUED | OUTPATIENT
Start: 2017-09-20 | End: 2017-09-20

## 2017-09-20 RX ORDER — GLIPIZIDE 5 MG/1
TABLET, EXTENDED RELEASE ORAL
COMMUNITY
Start: 2017-07-19 | End: 2017-09-27 | Stop reason: SDUPTHER

## 2017-09-20 RX ORDER — GUAIFENESIN 600 MG/1
1200 TABLET, EXTENDED RELEASE ORAL 2 TIMES DAILY
Qty: 40 TABLET | Refills: 0 | Status: SHIPPED | OUTPATIENT
Start: 2017-09-20 | End: 2017-11-15

## 2017-09-20 RX ORDER — METHYLPREDNISOLONE ACETATE 80 MG/ML
80 INJECTION, SUSPENSION INTRA-ARTICULAR; INTRALESIONAL; INTRAMUSCULAR; SOFT TISSUE ONCE
Status: COMPLETED | OUTPATIENT
Start: 2017-09-20 | End: 2017-09-20

## 2017-09-20 RX ADMIN — METHYLPREDNISOLONE ACETATE 80 MG: 80 INJECTION, SUSPENSION INTRA-ARTICULAR; INTRALESIONAL; INTRAMUSCULAR; SOFT TISSUE at 13:50

## 2017-09-20 NOTE — PROGRESS NOTES
Subjective   Tatyana Yeager is a 83 y.o. female who presents to the office complaining of URI.     URI    This is a new problem. The current episode started yesterday. The problem has been rapidly worsening. There has been no fever. Associated symptoms include congestion, a plugged ear sensation, rhinorrhea, a sore throat and swollen glands. Pertinent negatives include no abdominal pain, chest pain, coughing, diarrhea, dysuria, ear pain, headaches, joint pain, joint swelling, nausea, neck pain, rash, sinus pain, sneezing, vomiting or wheezing. Associated symptoms comments: Also experiences pain with swallowing, tenderness of left side of neck, PND, and body aches. No cough yet and no chills. She has tried increased fluids for the symptoms. The treatment provided no relief.   Arm Pain    The incident occurred more than 1 week ago (about two weeks ago). Incident location: not sure if there was an injury, patient's daughter says patient walked her dog around that time and maybe caused the problem. The injury mechanism is unknown. The pain is present in the right shoulder and upper right arm. The quality of the pain is described as aching, shooting and stabbing. The pain radiates to the right arm. The pain is at a severity of 10/10. The pain is severe. The pain has been constant since the incident. Associated symptoms include muscle weakness. Pertinent negatives include no chest pain, numbness or tingling. The symptoms are aggravated by lifting and movement (unable to lift up arm far enough to do her hair int the mornings due to the pain). She has tried elevation (patient has lortab 5mg she takes bid for low back pain but it is not helping with shoulder pain) for the symptoms. The treatment provided mild relief.      Patient has history of elevated liver enzymes and fatty liver and kidney dysfunction.  Patient states that great granddaughter who is 3 years old had cough, sore throat, and sinus problems recently and  daughter is sick with same symptoms as well.    The following portions of the patient's history were reviewed and updated as appropriate: allergies, current medications, past family history, past medical history, past social history, past surgical history and problem list.    Review of Systems   Constitutional: Positive for activity change (difficulty doing things with right arm) and fatigue. Negative for appetite change, chills and fever.   HENT: Positive for congestion, postnasal drip, rhinorrhea, sore throat and trouble swallowing (due to pain). Negative for ear discharge, ear pain, hearing loss, mouth sores, nosebleeds, sinus pain, sinus pressure and sneezing.    Eyes: Negative for photophobia, pain, discharge, redness, itching and visual disturbance.   Respiratory: Negative for cough, chest tightness, shortness of breath and wheezing.    Cardiovascular: Negative for chest pain, palpitations and leg swelling.   Gastrointestinal: Negative for abdominal pain, diarrhea, nausea and vomiting.   Genitourinary: Negative for dysuria.   Musculoskeletal: Negative for joint pain and neck pain.        Body aches   Skin: Negative for rash.   Neurological: Negative for tingling, weakness, numbness and headaches.       Past Medical History:   Diagnosis Date   • Acquired hypothyroidism    • Aortic valve stenosis    • Artificial lens present    • Borderline glaucoma    • Chronic depression    • Chronic pain    • Corneal foreign body     OS   • Coronary arteriosclerosis    • Cortical senile cataract    • Diabetes mellitus     NO RETINOPATHY   • Diverticular disease of colon    • Essential hypertension    • Generalized anxiety disorder    • GERD (gastroesophageal reflux disease)    • Histoplasmosis with retinitis    • History of bone density study 03/26/2009    DXA BONE DENSITY AXIAL 41953 (Normal for the hips and lumbar region. Hips represent 3.9% improvement.Lumbar represents 1.3 % improvement)   • History of echocardiogram  "09/16/2015    Echocardiogram W/ color flow 30395 (Overall LV contractility normal with Ef of 55% with LVH and diastolic relaxation abnormality of the left ventricle.Moderate to severe AV stenosis.Mild mitral regurg.No intracardiac mass or thrombus.)   • History of mammogram 03/26/2009    benign findings    • Hyperlipidemia     not able to take statin     • Joint pain    • Low back pain    • Mammogram declined 2013   • Nonexudative age-related macular degeneration     MILD   • Nuclear cataract    • Peripheral vascular disease    • Posterior subcapsular polar senile cataract    • Primary open angle glaucoma    • Transient ischemic attack involving carotid artery     Carotid territory transient ischemic attack     • Transient visual loss    • Type 2 diabetes mellitus        Family History   Problem Relation Age of Onset   • Cirrhosis Mother      LIVER   • Throat cancer Father    • Coronary artery disease Other    • Diabetes Other    • Heart disease Other    • Breast cancer Neg Hx    • Colon cancer Neg Hx    • Endometrial cancer Neg Hx           Objective   /64 (BP Location: Right arm, Patient Position: Sitting, Cuff Size: Adult)  Pulse 76  Temp 98.5 °F (36.9 °C) (Oral)   Resp 16  Ht 62\" (157.5 cm)  Wt 177 lb (80.3 kg)  SpO2 98%  Breastfeeding? No  BMI 32.37 kg/m2  Physical Exam   Constitutional: She appears well-developed and well-nourished. She is cooperative.   HENT:   Head: Normocephalic.   Right Ear: Hearing, external ear and ear canal normal. No drainage. Tympanic membrane is erythematous. Tympanic membrane is not injected, not retracted and not bulging. No decreased hearing is noted.   Left Ear: Hearing, external ear and ear canal normal. No drainage. Tympanic membrane is bulging. Tympanic membrane is not injected, not erythematous and not retracted. No decreased hearing is noted.   Nose: Mucosal edema and sinus tenderness present. No rhinorrhea. Right sinus exhibits no maxillary sinus tenderness " and no frontal sinus tenderness. Left sinus exhibits no maxillary sinus tenderness and no frontal sinus tenderness.   Mouth/Throat: Uvula is midline and mucous membranes are normal. Oropharyngeal exudate, posterior oropharyngeal edema and posterior oropharyngeal erythema present. Tonsils are 2+ on the right.   Eyes: Conjunctivae are normal. Pupils are equal, round, and reactive to light. Right conjunctiva is not injected. Right conjunctiva has no hemorrhage. Left conjunctiva is not injected. Left conjunctiva has no hemorrhage.   Cardiovascular: Normal rate, regular rhythm, normal heart sounds and intact distal pulses.  Exam reveals no gallop and no friction rub.    No murmur heard.  Pulmonary/Chest: Effort normal and breath sounds normal. No respiratory distress. She has no wheezes. She has no rales.   Musculoskeletal:        Right shoulder: She exhibits decreased range of motion, tenderness, bony tenderness, pain and decreased strength. She exhibits no swelling, no effusion, no crepitus, no deformity and normal pulse.        Arms:  Lymphadenopathy:     She has cervical adenopathy.   Neurological: She is alert. No sensory deficit.   Psychiatric: She has a normal mood and affect. Her behavior is normal.   Nursing note and vitals reviewed.       PHQ-2/PHQ-9 Depression Screening 9/20/2017   Little interest or pleasure in doing things 0   Feeling down, depressed, or hopeless 0   Trouble falling or staying asleep, or sleeping too much -   Feeling tired or having little energy -   Poor appetite or overeating -   Feeling bad about yourself - or that you are a failure or have let yourself or your family down -   Trouble concentrating on things, such as reading the newspaper or watching television -   Moving or speaking so slowly that other people could have noticed. Or the opposite - being so fidgety or restless that you have been moving around a lot more than usual -   Thoughts that you would be better off dead, or of  hurting yourself in some way -   Total Score 0   If you checked off any problems, how difficult have these problems made it for you to do your work, take care of things at home, or get along with other people? -         Assessment/Plan   Tatyana was seen today for sore throat.    Diagnoses and all orders for this visit:    Viral URI  -     methylPREDNISolone acetate (DEPO-medrol) injection 80 mg; Inject 1 mL into the shoulder, thigh, or buttocks 1 (One) Time.  -     guaiFENesin (MUCINEX) 600 MG 12 hr tablet; Take 2 tablets by mouth 2 (Two) Times a Day.    Acute pain of right shoulder  -     Discontinue: triamcinolone acetonide (KENALOG-40) injection 60 mg; Inject 1.5 mL into the shoulder, thigh, or buttocks 1 (One) Time.  -     methylPREDNISolone acetate (DEPO-medrol) injection 80 mg; Inject 1 mL into the shoulder, thigh, or buttocks 1 (One) Time.  -     lidocaine (XYLOCAINE) 5 % ointment; Apply  topically 3 (Three) Times a Day As Needed for Mild Pain  or Moderate Pain .  -     Cancel: XR Shoulder 2+ View Right (In Office)  -     Cancel: XR shoulder 2+ vw bilateral  -     XR shoulder 2+ vw right    Acute suppurative otitis media of right ear without spontaneous rupture of tympanic membrane, recurrence not specified  -     amoxicillin-clavulanate (AUGMENTIN) 875-125 MG per tablet; Take 1 tablet by mouth 2 (Two) Times a Day.           Patient has right ear infection and viral URI, prescribed Mucinex and Augmentin.  Educated patient to drink plenty of fluids and to rest.  Patient educated to call me know if cough develops and keeps her up at night.  Patient has right shoulder pain, ordered x-ray, gave Depo-Medrol injection, lidocaine ointment to be applied topically, and a sling to help with the pain.  Spoke with Dr. Matos her PCP, educated patient to follow-up with Dr. Matos if right shoulder pain does not improve.  Patient educated to follow up with me or Dr. Matos if URI and ear infection symptoms do not  improve or get worse.  Patient in agreement and understanding of plan of care.    Arm sling provided from office, order from [employer], size medium adult.     An After Visit Summary was printed and given to the patient.      Current Outpatient Prescriptions:   •  ALPRAZolam (XANAX) 1 MG tablet, Take 1 tablet by mouth At Night As Needed for Anxiety., Disp: 90 tablet, Rfl: 0  •  amLODIPine (NORVASC) 5 MG tablet, Take 1 tablet by mouth Daily., Disp: 90 tablet, Rfl: 1  •  clopidogrel (PLAVIX) 75 MG tablet, Take 1 tablet by mouth Daily., Disp: 90 tablet, Rfl: 1  •  doxepin (SINEquan) 100 MG capsule, Take 1 capsule by mouth Every Night., Disp: 90 capsule, Rfl: 1  •  famotidine (PEPCID) 20 MG tablet, Take 1 tablet by mouth 2 (Two) Times a Day., Disp: 180 tablet, Rfl: 1  •  GLIPIZIDE XL 5 MG ER tablet, , Disp: , Rfl:   •  hydrochlorothiazide (HYDRODIURIL) 25 MG tablet, Take 1 tablet by mouth Daily., Disp: 90 tablet, Rfl: 1  •  isosorbide mononitrate (IMDUR) 30 MG 24 hr tablet, Take 1 tablet by mouth Daily., Disp: 90 tablet, Rfl: 1  •  levothyroxine (SYNTHROID, LEVOTHROID) 88 MCG tablet, Take 1 tablet by mouth Daily., Disp: 90 tablet, Rfl: 1  •  lisinopril (PRINIVIL,ZESTRIL) 2.5 MG tablet, Take 1 tablet by mouth Daily., Disp: 90 tablet, Rfl: 0  •  meclizine (ANTIVERT) 12.5 MG tablet, Take 1 tablet by mouth 2 (Two) Times a Day As Needed for dizziness., Disp: 180 tablet, Rfl: 0  •  metoprolol succinate XL (TOPROL-XL) 50 MG 24 hr tablet, Take 1 tablet by mouth Daily., Disp: 90 tablet, Rfl: 1  •  nitroglycerin (NITROSTAT) 0.4 MG SL tablet, Place 0.4 mg under the tongue. DISSOLVE ONE TABLET UNDER THE TONGUE AS NEEDED FOR CHEST PAIN. YOU MAY REPEAT ONCE IF NEEDED AND GO TO THE E.R., Disp: , Rfl:   •  Omega-3 Fatty Acids (FISH OIL) 1000 MG capsule capsule, Take 1 capsule by mouth 3 (Three) Times a Day With Meals., Disp: 270 capsule, Rfl: 1  •  amoxicillin-clavulanate (AUGMENTIN) 875-125 MG per tablet, Take 1 tablet by  mouth 2 (Two) Times a Day., Disp: 20 tablet, Rfl: 0  •  fluticasone (FLONASE) 50 MCG/ACT nasal spray, 2 sprays into each nostril 2 (Two) Times a Day for 14 days. Administer 2 sprays in each nostril for each dose., Disp: 16 g, Rfl: 0  •  guaiFENesin (MUCINEX) 600 MG 12 hr tablet, Take 2 tablets by mouth 2 (Two) Times a Day., Disp: 40 tablet, Rfl: 0  •  HYDROcodone-acetaminophen (NORCO) 5-325 MG per tablet, Take 1 tablet by mouth 2 (Two) Times a Day As Needed for Moderate Pain ., Disp: 60 tablet, Rfl: 0  •  lidocaine (XYLOCAINE) 5 % ointment, Apply  topically 3 (Three) Times a Day As Needed for Mild Pain  or Moderate Pain ., Disp: 30 g, Rfl: 0      STEVE Zapata        This document has been electronically signed by STEVE Zapata on September 26, 2017 8:08 AM

## 2017-09-22 ENCOUNTER — TELEPHONE (OUTPATIENT)
Dept: FAMILY MEDICINE CLINIC | Facility: CLINIC | Age: 82
End: 2017-09-22

## 2017-09-22 DIAGNOSIS — J30.9 ALLERGIC RHINITIS, UNSPECIFIED ALLERGIC RHINITIS TRIGGER, UNSPECIFIED RHINITIS SEASONALITY: ICD-10-CM

## 2017-09-22 DIAGNOSIS — R09.82 POST-NASAL DRIP: Primary | ICD-10-CM

## 2017-09-22 RX ORDER — FLUTICASONE PROPIONATE 50 MCG
2 SPRAY, SUSPENSION (ML) NASAL 2 TIMES DAILY
Qty: 16 G | Refills: 0 | Status: SHIPPED | OUTPATIENT
Start: 2017-09-22 | End: 2017-10-06

## 2017-09-22 RX ORDER — HYDROCODONE BITARTRATE AND ACETAMINOPHEN 5; 325 MG/1; MG/1
1 TABLET ORAL 2 TIMES DAILY PRN
Qty: 60 TABLET | Refills: 0 | Status: SHIPPED | OUTPATIENT
Start: 2017-09-22 | End: 2017-10-24 | Stop reason: SDUPTHER

## 2017-09-22 NOTE — TELEPHONE ENCOUNTER
Pt stated that she has an appt with Yaa but it 2 months out so if the pain continues or gets worse she will call to schedule an appt to follow up with us

## 2017-09-22 NOTE — TELEPHONE ENCOUNTER
On another note she says that her throat is really sore and scratchy this morning feels more sore than yesterday ???   Is there anything you would like to do

## 2017-09-22 NOTE — TELEPHONE ENCOUNTER
Called and spoke to daughter relayed information let her know about the nasal spray and if she isnt feeling better try kera and if they cant see her we would see her

## 2017-09-22 NOTE — TELEPHONE ENCOUNTER
----- Message from STEVE Zapata sent at 9/21/2017  2:02 PM CDT -----  Please let patient know that her x-ray of her shoulder came back, there are no fractures or bone abnormalities.  But she does have some degenerative changes could be related to arthritis or natural changes that occur with aging, this could be causing the pinched nerve pain however the x-ray only looks at the bones so further testing may need to be done if pain does not resolve, but ask her to follow-up with Dr. Matos or myself if Dr. Matos has no availability if shoulder pain does not continue to get better over the next week. thanks

## 2017-09-26 RX ORDER — HYDROCODONE BITARTRATE AND ACETAMINOPHEN 5; 325 MG/1; MG/1
1 TABLET ORAL 2 TIMES DAILY PRN
Qty: 60 TABLET | Refills: 0 | OUTPATIENT
Start: 2017-09-26

## 2017-09-27 RX ORDER — LISINOPRIL 2.5 MG/1
TABLET ORAL
Qty: 90 TABLET | Refills: 0 | Status: SHIPPED | OUTPATIENT
Start: 2017-09-27 | End: 2017-11-15 | Stop reason: SDUPTHER

## 2017-09-27 RX ORDER — GLIPIZIDE 5 MG/1
5 TABLET, EXTENDED RELEASE ORAL DAILY
Qty: 90 TABLET | Refills: 0 | Status: SHIPPED | OUTPATIENT
Start: 2017-09-27 | End: 2017-11-15 | Stop reason: SDUPTHER

## 2017-10-24 RX ORDER — HYDROCODONE BITARTRATE AND ACETAMINOPHEN 5; 325 MG/1; MG/1
1 TABLET ORAL 2 TIMES DAILY PRN
Qty: 60 TABLET | Refills: 0 | Status: SHIPPED | OUTPATIENT
Start: 2017-10-24 | End: 2017-11-15 | Stop reason: SDUPTHER

## 2017-11-15 ENCOUNTER — LAB (OUTPATIENT)
Dept: LAB | Facility: OTHER | Age: 82
End: 2017-11-15

## 2017-11-15 ENCOUNTER — OFFICE VISIT (OUTPATIENT)
Dept: FAMILY MEDICINE CLINIC | Facility: CLINIC | Age: 82
End: 2017-11-15

## 2017-11-15 VITALS
SYSTOLIC BLOOD PRESSURE: 118 MMHG | HEART RATE: 76 BPM | WEIGHT: 172 LBS | DIASTOLIC BLOOD PRESSURE: 64 MMHG | BODY MASS INDEX: 31.65 KG/M2 | TEMPERATURE: 97.9 F | OXYGEN SATURATION: 97 % | HEIGHT: 62 IN

## 2017-11-15 DIAGNOSIS — M19.011 PRIMARY OSTEOARTHRITIS OF RIGHT SHOULDER: ICD-10-CM

## 2017-11-15 DIAGNOSIS — K21.9 GASTROESOPHAGEAL REFLUX DISEASE WITHOUT ESOPHAGITIS: ICD-10-CM

## 2017-11-15 DIAGNOSIS — G89.29 CHRONIC MIDLINE LOW BACK PAIN WITHOUT SCIATICA: ICD-10-CM

## 2017-11-15 DIAGNOSIS — M51.36 DDD (DEGENERATIVE DISC DISEASE), LUMBAR: ICD-10-CM

## 2017-11-15 DIAGNOSIS — E11.9 DIABETES MELLITUS WITHOUT COMPLICATION (HCC): ICD-10-CM

## 2017-11-15 DIAGNOSIS — G47.00 INSOMNIA, UNSPECIFIED TYPE: ICD-10-CM

## 2017-11-15 DIAGNOSIS — E03.9 ACQUIRED HYPOTHYROIDISM: ICD-10-CM

## 2017-11-15 DIAGNOSIS — R34 DECREASED URINATION: ICD-10-CM

## 2017-11-15 DIAGNOSIS — I10 ESSENTIAL HYPERTENSION: ICD-10-CM

## 2017-11-15 DIAGNOSIS — M54.50 CHRONIC MIDLINE LOW BACK PAIN WITHOUT SCIATICA: ICD-10-CM

## 2017-11-15 DIAGNOSIS — E11.9 DIABETES MELLITUS WITHOUT COMPLICATION (HCC): Primary | ICD-10-CM

## 2017-11-15 DIAGNOSIS — E78.2 MIXED HYPERLIPIDEMIA: ICD-10-CM

## 2017-11-15 LAB
ALBUMIN SERPL-MCNC: 4.5 G/DL (ref 3.2–5.5)
ALBUMIN/GLOB SERPL: 1.3 G/DL (ref 1–3)
ALP SERPL-CCNC: 58 U/L (ref 15–121)
ALT SERPL W P-5'-P-CCNC: 17 U/L (ref 10–60)
ANION GAP SERPL CALCULATED.3IONS-SCNC: 11 MMOL/L (ref 5–15)
AST SERPL-CCNC: 20 U/L (ref 10–60)
BACTERIA UR QL AUTO: ABNORMAL /HPF
BASOPHILS # BLD AUTO: 0.03 10*3/MM3 (ref 0–0.2)
BASOPHILS NFR BLD AUTO: 0.4 % (ref 0–2)
BILIRUB SERPL-MCNC: 0.9 MG/DL (ref 0.2–1)
BILIRUB UR QL STRIP: NEGATIVE
BUN BLD-MCNC: 28 MG/DL (ref 8–25)
BUN/CREAT SERPL: 23.3 (ref 7–25)
CALCIUM SPEC-SCNC: 9.5 MG/DL (ref 8.4–10.8)
CHLORIDE SERPL-SCNC: 101 MMOL/L (ref 100–112)
CLARITY UR: ABNORMAL
CO2 SERPL-SCNC: 28 MMOL/L (ref 20–32)
COLOR UR: YELLOW
CREAT BLD-MCNC: 1.2 MG/DL (ref 0.4–1.3)
DEPRECATED RDW RBC AUTO: 43.8 FL (ref 36.4–46.3)
EOSINOPHIL # BLD AUTO: 0.12 10*3/MM3 (ref 0–0.7)
EOSINOPHIL NFR BLD AUTO: 1.7 % (ref 0–7)
ERYTHROCYTE [DISTWIDTH] IN BLOOD BY AUTOMATED COUNT: 13 % (ref 11.5–14.5)
GFR SERPL CREATININE-BSD FRML MDRD: 43 ML/MIN/1.73 (ref 39–90)
GLOBULIN UR ELPH-MCNC: 3.5 GM/DL (ref 2.5–4.6)
GLUCOSE BLD-MCNC: 105 MG/DL (ref 70–100)
GLUCOSE UR STRIP-MCNC: NEGATIVE MG/DL
HCT VFR BLD AUTO: 38.4 % (ref 35–45)
HGB BLD-MCNC: 12.6 G/DL (ref 12–15.5)
HGB UR QL STRIP.AUTO: NEGATIVE
HYALINE CASTS UR QL AUTO: ABNORMAL /LPF
KETONES UR QL STRIP: NEGATIVE
LEUKOCYTE ESTERASE UR QL STRIP.AUTO: ABNORMAL
LYMPHOCYTES # BLD AUTO: 2.38 10*3/MM3 (ref 0.6–4.2)
LYMPHOCYTES NFR BLD AUTO: 32.9 % (ref 10–50)
MCH RBC QN AUTO: 31.1 PG (ref 26.5–34)
MCHC RBC AUTO-ENTMCNC: 32.8 G/DL (ref 31.4–36)
MCV RBC AUTO: 94.8 FL (ref 80–98)
MONOCYTES # BLD AUTO: 0.52 10*3/MM3 (ref 0–0.9)
MONOCYTES NFR BLD AUTO: 7.2 % (ref 0–12)
MUCOUS THREADS URNS QL MICRO: ABNORMAL /HPF
NEUTROPHILS # BLD AUTO: 4.18 10*3/MM3 (ref 2–8.6)
NEUTROPHILS NFR BLD AUTO: 57.8 % (ref 37–80)
NITRITE UR QL STRIP: NEGATIVE
PH UR STRIP.AUTO: 5.5 [PH] (ref 5.5–8)
PLATELET # BLD AUTO: 225 10*3/MM3 (ref 150–450)
PMV BLD AUTO: 10.3 FL (ref 8–12)
POTASSIUM BLD-SCNC: 4.3 MMOL/L (ref 3.4–5.4)
PROT SERPL-MCNC: 8 G/DL (ref 6.7–8.2)
PROT UR QL STRIP: NEGATIVE
RBC # BLD AUTO: 4.05 10*6/MM3 (ref 3.77–5.16)
RBC # UR: ABNORMAL /HPF
REF LAB TEST METHOD: ABNORMAL
SODIUM BLD-SCNC: 140 MMOL/L (ref 134–146)
SP GR UR STRIP: 1.02 (ref 1–1.03)
SQUAMOUS #/AREA URNS HPF: ABNORMAL /HPF
UROBILINOGEN UR QL STRIP: ABNORMAL
WBC NRBC COR # BLD: 7.23 10*3/MM3 (ref 3.2–9.8)
WBC UR QL AUTO: ABNORMAL /HPF

## 2017-11-15 PROCEDURE — 82043 UR ALBUMIN QUANTITATIVE: CPT | Performed by: FAMILY MEDICINE

## 2017-11-15 PROCEDURE — 36415 COLL VENOUS BLD VENIPUNCTURE: CPT | Performed by: FAMILY MEDICINE

## 2017-11-15 PROCEDURE — 84443 ASSAY THYROID STIM HORMONE: CPT | Performed by: FAMILY MEDICINE

## 2017-11-15 PROCEDURE — 80053 COMPREHEN METABOLIC PANEL: CPT | Performed by: FAMILY MEDICINE

## 2017-11-15 PROCEDURE — 83036 HEMOGLOBIN GLYCOSYLATED A1C: CPT | Performed by: FAMILY MEDICINE

## 2017-11-15 PROCEDURE — 81001 URINALYSIS AUTO W/SCOPE: CPT | Performed by: FAMILY MEDICINE

## 2017-11-15 PROCEDURE — 85025 COMPLETE CBC W/AUTO DIFF WBC: CPT | Performed by: FAMILY MEDICINE

## 2017-11-15 PROCEDURE — 87086 URINE CULTURE/COLONY COUNT: CPT | Performed by: FAMILY MEDICINE

## 2017-11-15 PROCEDURE — 99214 OFFICE O/P EST MOD 30 MIN: CPT | Performed by: FAMILY MEDICINE

## 2017-11-15 RX ORDER — ISOSORBIDE MONONITRATE 30 MG/1
30 TABLET, EXTENDED RELEASE ORAL DAILY
Qty: 90 TABLET | Refills: 1 | Status: SHIPPED | OUTPATIENT
Start: 2017-11-15 | End: 2018-04-04 | Stop reason: SDUPTHER

## 2017-11-15 RX ORDER — HYDROCHLOROTHIAZIDE 25 MG/1
25 TABLET ORAL DAILY
Qty: 90 TABLET | Refills: 1 | Status: SHIPPED | OUTPATIENT
Start: 2017-11-15 | End: 2018-04-30 | Stop reason: SDUPTHER

## 2017-11-15 RX ORDER — AMLODIPINE BESYLATE 5 MG/1
5 TABLET ORAL DAILY
Qty: 90 TABLET | Refills: 1 | Status: SHIPPED | OUTPATIENT
Start: 2017-11-15 | End: 2018-06-12 | Stop reason: SDUPTHER

## 2017-11-15 RX ORDER — LISINOPRIL 2.5 MG/1
2.5 TABLET ORAL DAILY
Qty: 90 TABLET | Refills: 1 | Status: SHIPPED | OUTPATIENT
Start: 2017-11-15 | End: 2018-04-09 | Stop reason: SDUPTHER

## 2017-11-15 RX ORDER — DOXEPIN HYDROCHLORIDE 100 MG/1
100 CAPSULE ORAL NIGHTLY
Qty: 90 CAPSULE | Refills: 1 | Status: SHIPPED | OUTPATIENT
Start: 2017-11-15 | End: 2018-06-12 | Stop reason: SDUPTHER

## 2017-11-15 RX ORDER — HYDROCODONE BITARTRATE AND ACETAMINOPHEN 5; 325 MG/1; MG/1
1 TABLET ORAL 2 TIMES DAILY PRN
Qty: 60 TABLET | Refills: 0 | Status: SHIPPED | OUTPATIENT
Start: 2017-11-15 | End: 2017-12-18 | Stop reason: SDUPTHER

## 2017-11-15 RX ORDER — LEVOTHYROXINE SODIUM 88 UG/1
88 TABLET ORAL DAILY
Qty: 90 TABLET | Refills: 1 | Status: SHIPPED | OUTPATIENT
Start: 2017-11-15 | End: 2018-06-07 | Stop reason: SDUPTHER

## 2017-11-15 RX ORDER — METOPROLOL SUCCINATE 50 MG/1
50 TABLET, EXTENDED RELEASE ORAL DAILY
Qty: 90 TABLET | Refills: 1 | Status: SHIPPED | OUTPATIENT
Start: 2017-11-15 | End: 2018-04-09 | Stop reason: SDUPTHER

## 2017-11-15 RX ORDER — ALPRAZOLAM 1 MG/1
1 TABLET ORAL NIGHTLY PRN
Qty: 90 TABLET | Refills: 0 | Status: SHIPPED | OUTPATIENT
Start: 2017-11-15 | End: 2018-02-14 | Stop reason: SDUPTHER

## 2017-11-15 RX ORDER — GLIPIZIDE 5 MG/1
5 TABLET, EXTENDED RELEASE ORAL DAILY
Qty: 90 TABLET | Refills: 1 | Status: SHIPPED | OUTPATIENT
Start: 2017-11-15 | End: 2018-04-04 | Stop reason: SDUPTHER

## 2017-11-15 RX ORDER — FAMOTIDINE 20 MG/1
20 TABLET, FILM COATED ORAL 2 TIMES DAILY
Qty: 180 TABLET | Refills: 1 | Status: SHIPPED | OUTPATIENT
Start: 2017-11-15 | End: 2018-06-06 | Stop reason: SDUPTHER

## 2017-11-15 RX ORDER — CLOPIDOGREL BISULFATE 75 MG/1
75 TABLET ORAL DAILY
Qty: 90 TABLET | Refills: 1 | Status: SHIPPED | OUTPATIENT
Start: 2017-11-15 | End: 2018-06-12 | Stop reason: SDUPTHER

## 2017-11-15 RX ORDER — CHLORAL HYDRATE 500 MG
1000 CAPSULE ORAL
Qty: 270 CAPSULE | Refills: 1 | Status: SHIPPED | OUTPATIENT
Start: 2017-11-15 | End: 2018-06-12 | Stop reason: SDUPTHER

## 2017-11-15 NOTE — PROGRESS NOTES
Subjective   Chief Complaint   Patient presents with   • Follow-up     3 month   • Anxiety     refill xanax, last dose 11-14-17   • Pain     refill hydrocodone, last dose 11-14-17   • Med Refill     routine     Tatyana Yeager is a 83 y.o. female.   Follow-up (3 month); Anxiety (refill xanax, last dose 11-14-17); Pain (refill hydrocodone, last dose 11-14-17); and Med Refill (routine)    History of Present Illness     [facility] - chronic back pain, insomnia, gerd, htn, hypothyroidism, diabetes    Chronic back pain with DDD - controlled with norco  No reported side effects with medication  Previously followed by Dr Goodwin  Presenting today for a refill    Htn, PVD, VAMSI, stents placed, AS, MR, CABG in 2005 -   controlled with imdur, metoprolol, lisinopril, norvasc, hctz  Anticoagulated with plavix  Followed by Dr Alfa Montiel scheduled in March 2018    Diabetes - controlled with glipizide  Stopped metformin due to elevated serum creatinine  Last bmp checked in May -  Cr was 1.3  Average fasting blood glucose around 100    Anxiety - controlled with xanax PRN    constipation - doing well with linzess  Can only take this medication every other day    Complaining of right shoulder pain  Did see Carmela Esposito for shoulder pain in September  Xray indicated arthritis  Improved with steroid injection  Remains uncontrolled    Hypothyroidism - controlled with synthroid    C/o decreased urination    The following portions of the patient's history were reviewed and updated as appropriate: allergies, current medications, past family history, past medical history, past social history, past surgical history and problem list.    Review of Systems   Constitutional: Negative for appetite change, chills, fatigue and fever.   HENT: Negative for congestion, ear pain, rhinorrhea and sore throat.    Eyes: Negative for pain.   Respiratory: Negative for cough and shortness of breath.    Cardiovascular: Negative for chest pain and palpitations.  "  Gastrointestinal: Negative for abdominal pain, constipation, diarrhea and nausea.   Genitourinary: Negative for dysuria.        Decreased urination   Musculoskeletal: Positive for arthralgias. Negative for back pain, joint swelling and neck pain.   Skin: Negative for rash.   Neurological: Negative for dizziness and headaches.       Objective   /64  Pulse 76  Temp 97.9 °F (36.6 °C) (Tympanic)   Ht 62\" (157.5 cm)  Wt 172 lb (78 kg)  SpO2 97%  BMI 31.46 kg/m2  Physical Exam   Constitutional: She is oriented to person, place, and time. She appears well-developed and well-nourished.   HENT:   Head: Normocephalic and atraumatic.   Eyes: Pupils are equal, round, and reactive to light.   Neck: Normal range of motion. Neck supple.   Cardiovascular: Normal rate, regular rhythm and normal heart sounds.    Pulmonary/Chest: Effort normal and breath sounds normal. No respiratory distress. She has no wheezes. She has no rales.   Abdominal: Soft. Bowel sounds are normal.   Musculoskeletal:        Right shoulder: She exhibits decreased range of motion, pain and decreased strength.   Neurological: She is alert and oriented to person, place, and time.   Skin: Skin is warm and dry.   Psychiatric: She has a normal mood and affect.   Nursing note and vitals reviewed.      Assessment/Plan   Problems Addressed this Visit        Cardiovascular and Mediastinum    Essential hypertension    Relevant Medications    lisinopril (PRINIVIL,ZESTRIL) 2.5 MG tablet    amLODIPine (NORVASC) 5 MG tablet    hydrochlorothiazide (HYDRODIURIL) 25 MG tablet    isosorbide mononitrate (IMDUR) 30 MG 24 hr tablet    metoprolol succinate XL (TOPROL-XL) 50 MG 24 hr tablet    Other Relevant Orders    Comprehensive Metabolic Panel    Hyperlipidemia    Relevant Medications    Omega-3 Fatty Acids (FISH OIL) 1000 MG capsule capsule       Digestive    Gastroesophageal reflux disease without esophagitis    Relevant Medications    famotidine (PEPCID) 20 MG " tablet       Endocrine    Diabetes mellitus without complication - Primary    Relevant Medications    GLIPIZIDE XL 5 MG ER tablet    Other Relevant Orders    Hemoglobin A1c    MicroAlbumin, Urine, Random - Urine, Clean Catch    CBC & Differential    CBC Auto Differential    Acquired hypothyroidism    Relevant Medications    metoprolol succinate XL (TOPROL-XL) 50 MG 24 hr tablet    levothyroxine (SYNTHROID, LEVOTHROID) 88 MCG tablet    Other Relevant Orders    TSH       Nervous and Auditory    Chronic midline low back pain without sciatica       Musculoskeletal and Integument    DDD (degenerative disc disease), lumbar    Primary osteoarthritis of right shoulder      Other Visit Diagnoses     Insomnia, unspecified type        Relevant Medications    doxepin (SINEquan) 100 MG capsule    Decreased urination        Relevant Orders    Urinalysis With / Culture If Indicated - Urine, Clean Catch        Labs ordered    Refilled medications    Refilled xanax, norco    Discussed treatment options for shoulder  Return if no improvement  Recommend ice, rest, tylenol PRN    Recheck in 3 months

## 2017-11-16 LAB
ALBUMIN UR-MCNC: 2.9 MG/L
BACTERIA SPEC AEROBE CULT: NORMAL
BACTERIA SPEC AEROBE CULT: NORMAL
HBA1C MFR BLD: 5.6 % (ref 4–5.6)
TSH SERPL DL<=0.05 MIU/L-ACNC: 3.41 MIU/ML (ref 0.46–4.68)

## 2017-12-18 RX ORDER — HYDROCODONE BITARTRATE AND ACETAMINOPHEN 5; 325 MG/1; MG/1
1 TABLET ORAL 2 TIMES DAILY PRN
Qty: 60 TABLET | Refills: 0 | Status: SHIPPED | OUTPATIENT
Start: 2017-12-18 | End: 2018-01-17 | Stop reason: SDUPTHER

## 2017-12-18 NOTE — TELEPHONE ENCOUNTER
Patient daughter, Jamilah, called requesting a refill on Hydrocodone for patient. Sent note to Deisi

## 2018-01-17 ENCOUNTER — DOCUMENTATION (OUTPATIENT)
Dept: PHYSICAL THERAPY | Facility: CLINIC | Age: 83
End: 2018-01-17

## 2018-01-17 RX ORDER — HYDROCODONE BITARTRATE AND ACETAMINOPHEN 5; 325 MG/1; MG/1
1 TABLET ORAL 2 TIMES DAILY PRN
Qty: 60 TABLET | Refills: 0 | Status: SHIPPED | OUTPATIENT
Start: 2018-01-17 | End: 2018-02-14 | Stop reason: SDUPTHER

## 2018-01-17 NOTE — PROGRESS NOTES
Discharge Summary  Discharge Summary from Physical Therapy Report      Date of eval: 7/24/17  Number of Visits: 2/2     Discharge Status of Patient: slight improvement, compliant with HEP by 2nd visit.    Goals status: Partially Met    Prognosis fair    Comments 25% improvement, resolved vertigo to L, still a problem looking overhead and to the R.    Date of Discharge 1/17/18        Jameson Rowley, PT  Physical Therapist

## 2018-02-14 ENCOUNTER — OFFICE VISIT (OUTPATIENT)
Dept: FAMILY MEDICINE CLINIC | Facility: CLINIC | Age: 83
End: 2018-02-14

## 2018-02-14 VITALS
OXYGEN SATURATION: 97 % | SYSTOLIC BLOOD PRESSURE: 136 MMHG | HEIGHT: 62 IN | TEMPERATURE: 98.7 F | WEIGHT: 175 LBS | HEART RATE: 75 BPM | DIASTOLIC BLOOD PRESSURE: 74 MMHG | BODY MASS INDEX: 32.2 KG/M2

## 2018-02-14 DIAGNOSIS — I10 ESSENTIAL HYPERTENSION: ICD-10-CM

## 2018-02-14 DIAGNOSIS — M54.50 CHRONIC MIDLINE LOW BACK PAIN WITHOUT SCIATICA: ICD-10-CM

## 2018-02-14 DIAGNOSIS — E03.9 ACQUIRED HYPOTHYROIDISM: ICD-10-CM

## 2018-02-14 DIAGNOSIS — K59.03 THERAPEUTIC OPIOID INDUCED CONSTIPATION: Primary | ICD-10-CM

## 2018-02-14 DIAGNOSIS — E11.9 DIABETES MELLITUS WITHOUT COMPLICATION (HCC): ICD-10-CM

## 2018-02-14 DIAGNOSIS — M19.011 PRIMARY OSTEOARTHRITIS OF RIGHT SHOULDER: ICD-10-CM

## 2018-02-14 DIAGNOSIS — F41.1 GAD (GENERALIZED ANXIETY DISORDER): ICD-10-CM

## 2018-02-14 DIAGNOSIS — T40.2X5A THERAPEUTIC OPIOID INDUCED CONSTIPATION: Primary | ICD-10-CM

## 2018-02-14 DIAGNOSIS — G89.29 CHRONIC MIDLINE LOW BACK PAIN WITHOUT SCIATICA: ICD-10-CM

## 2018-02-14 PROCEDURE — 99214 OFFICE O/P EST MOD 30 MIN: CPT | Performed by: FAMILY MEDICINE

## 2018-02-14 RX ORDER — ALPRAZOLAM 1 MG/1
1 TABLET ORAL NIGHTLY PRN
Qty: 90 TABLET | Refills: 0 | Status: SHIPPED | OUTPATIENT
Start: 2018-02-14 | End: 2018-05-03 | Stop reason: SDUPTHER

## 2018-02-14 RX ORDER — HYDROCODONE BITARTRATE AND ACETAMINOPHEN 5; 325 MG/1; MG/1
1 TABLET ORAL 2 TIMES DAILY PRN
Qty: 60 TABLET | Refills: 0 | Status: SHIPPED | OUTPATIENT
Start: 2018-02-14 | End: 2018-03-14 | Stop reason: SDUPTHER

## 2018-02-14 RX ORDER — HYDROCODONE BITARTRATE AND ACETAMINOPHEN 5; 325 MG/1; MG/1
1 TABLET ORAL 2 TIMES DAILY PRN
Qty: 60 TABLET | Refills: 0 | Status: CANCELLED | OUTPATIENT
Start: 2018-02-14

## 2018-02-14 NOTE — PATIENT INSTRUCTIONS
Constipation, Adult  Constipation is when a person has fewer bowel movements in a week than normal, has difficulty having a bowel movement, or has stools that are dry, hard, or larger than normal. Constipation may be caused by an underlying condition. It may become worse with age if a person takes certain medicines and does not take in enough fluids.  Follow these instructions at home:  Eating and drinking     · Eat foods that have a lot of fiber, such as fresh fruits and vegetables, whole grains, and beans.  · Limit foods that are high in fat, low in fiber, or overly processed, such as french fries, hamburgers, cookies, candies, and soda.  · Drink enough fluid to keep your urine clear or pale yellow.  General instructions   · Exercise regularly or as told by your health care provider.  · Go to the restroom when you have the urge to go. Do not hold it in.  · Take over-the-counter and prescription medicines only as told by your health care provider. These include any fiber supplements.  · Practice pelvic floor retraining exercises, such as deep breathing while relaxing the lower abdomen and pelvic floor relaxation during bowel movements.  · Watch your condition for any changes.  · Keep all follow-up visits as told by your health care provider. This is important.  Contact a health care provider if:  · You have pain that gets worse.  · You have a fever.  · You do not have a bowel movement after 4 days.  · You vomit.  · You are not hungry.  · You lose weight.  · You are bleeding from the anus.  · You have thin, pencil-like stools.  Get help right away if:  · You have a fever and your symptoms suddenly get worse.  · You leak stool or have blood in your stool.  · Your abdomen is bloated.  · You have severe pain in your abdomen.  · You feel dizzy or you faint.  This information is not intended to replace advice given to you by your health care provider. Make sure you discuss any questions you have with your health care  provider.  Document Released: 09/15/2005 Document Revised: 07/07/2017 Document Reviewed: 06/07/2017  [employer] Interactive Patient Education © 2017 [employer]

## 2018-02-14 NOTE — PROGRESS NOTES
Subjective   Chief Complaint   Patient presents with   • Diabetes     3 months   • Med Refill     hydrocodone last dose this morning   • Constipation      about 2 months     Tatyana Yeager is a 83 y.o. female.   Diabetes (3 months); Med Refill (hydrocodone last dose this morning); and Constipation ( about 2 months)    History of Present Illness     cmh - chronic back pain, insomnia, gerd, htn, hypothyroidism, diabetes    Chronic back pain with DDD - controlled with norco  No reported side effects with medication  Previously followed by Dr Goodwin  Presenting today for a refill    Hypertension, PVD, VAMSI, stents placed, AS, MR, CABG in 2005 - managed with imdur, metoprolol, lisinopril, norvasc, hctz  Anticoagulated with plavix  Followed by Dr Alfa Montiel scheduled in March 2018    Diabetes - controlled with glipizide  Stopped metformin due to elevated serum creatinine  Last bmp checked in Nov - Cr was 1.2  Average fasting blood glucose around 100    Anxiety - controlled with xanax PRN    Opioid induced constipation - remains uncontrolled  Improved with linzess but cost of medication was too expensive  Asking to start new medication today  She has made lifestyle modifications - higher fiber diet, plenty of fluids with very little improvement    Hypothyroidism - controlled with synthroid    The following portions of the patient's history were reviewed and updated as appropriate: allergies, current medications, past family history, past medical history, past social history, past surgical history and problem list.    Review of Systems   Constitutional: Negative for appetite change, chills, fatigue and fever.   HENT: Negative for congestion, ear pain, rhinorrhea and sore throat.    Eyes: Negative for pain.   Respiratory: Negative for cough and shortness of breath.    Cardiovascular: Negative for chest pain and palpitations.   Gastrointestinal: Positive for abdominal pain and constipation. Negative for diarrhea and  "nausea.   Genitourinary: Negative for dysuria.   Musculoskeletal: Positive for arthralgias and back pain. Negative for joint swelling and neck pain.   Skin: Negative for rash.   Neurological: Negative for dizziness and headaches.       Objective   /74  Pulse 75  Temp 98.7 °F (37.1 °C)  Ht 157.5 cm (62.01\")  Wt 79.4 kg (175 lb)  SpO2 97%  BMI 32 kg/m2  Physical Exam   Constitutional: She is oriented to person, place, and time. She appears well-developed and well-nourished.   HENT:   Head: Normocephalic and atraumatic.   Eyes: Pupils are equal, round, and reactive to light.   Neck: Normal range of motion. Neck supple.   Cardiovascular: Normal rate, regular rhythm and normal heart sounds.    Pulmonary/Chest: Effort normal and breath sounds normal. No respiratory distress. She has no wheezes. She has no rales.   Abdominal: Soft. Bowel sounds are normal.   Musculoskeletal: Normal range of motion.   Neurological: She is alert and oriented to person, place, and time.   Skin: Skin is warm and dry.   Psychiatric: She has a normal mood and affect.   Nursing note and vitals reviewed.      Assessment/Plan   Problems Addressed this Visit        Cardiovascular and Mediastinum    Essential hypertension       Digestive    Drug-induced constipation - Primary       Endocrine    Diabetes mellitus without complication    Acquired hypothyroidism       Nervous and Auditory    Chronic midline low back pain without sciatica       Musculoskeletal and Integument    Primary osteoarthritis of right shoulder      Other Visit Diagnoses     BRITTANEY (generalized anxiety disorder)        Relevant Medications    ALPRAZolam (XANAX) 1 MG tablet        Educational handout on constipation  Start movantik - if medication does not improve or not tolerated recommended to change to miralax  Drink plenty of fluids    Refilled xanax, norco  meliza reviewed [id number]    Reviewed most recent lab results with patient  Due for labs November 2018    Recheck " in 4 weeks

## 2018-02-19 RX ORDER — NITROGLYCERIN 0.4 MG/1
TABLET SUBLINGUAL
Qty: 25 TABLET | Refills: 5 | Status: SHIPPED | OUTPATIENT
Start: 2018-02-19 | End: 2018-09-21 | Stop reason: SDUPTHER

## 2018-03-07 ENCOUNTER — OFFICE VISIT (OUTPATIENT)
Dept: CARDIOLOGY | Facility: CLINIC | Age: 83
End: 2018-03-07

## 2018-03-07 VITALS
SYSTOLIC BLOOD PRESSURE: 158 MMHG | WEIGHT: 176.31 LBS | DIASTOLIC BLOOD PRESSURE: 78 MMHG | OXYGEN SATURATION: 94 % | HEIGHT: 62 IN | BODY MASS INDEX: 32.44 KG/M2 | HEART RATE: 74 BPM

## 2018-03-07 DIAGNOSIS — I73.9 PERIPHERAL ARTERIAL DISEASE (HCC): ICD-10-CM

## 2018-03-07 DIAGNOSIS — I10 ESSENTIAL HYPERTENSION: ICD-10-CM

## 2018-03-07 DIAGNOSIS — E78.2 MIXED HYPERLIPIDEMIA: ICD-10-CM

## 2018-03-07 DIAGNOSIS — I25.709 CORONARY ARTERY DISEASE INVOLVING CORONARY BYPASS GRAFT OF NATIVE HEART WITH ANGINA PECTORIS (HCC): Primary | ICD-10-CM

## 2018-03-07 PROCEDURE — 99214 OFFICE O/P EST MOD 30 MIN: CPT | Performed by: INTERNAL MEDICINE

## 2018-03-07 NOTE — PROGRESS NOTES
[facility] Cardiology  OFFICE NOTE    Tatyana Yeager  83 y.o. female    03/07/2018  1. Coronary artery disease involving 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    2. Peripheral arterial disease    3. Essential hypertension    4. Mixed hyperlipidemia        Chief complaint - chronic stable angina      History of present Illness- 83-year-old lady with chronic stable angina with the Finnish class IIb's symptoms.  She also has history of shortness of breath associated with it and her sugars Are doing good and A1c is 6.2 .  She denies any change in symptoms she has chronic stable angina.   she uses nitroglycerin as needed, is on chronic isosorbide therapy.  She recently had dizziness with vertigo possibly middle ear infection and vertigo.  She is using a nitroglycerin every other day, I had given her Ranexa before but she could not afford it and she quit taking it.  She is on Calcijex block with amlodipine, isosorbide and lisinopril.  She is also complaining of some postprandial angina.  I discussed with her about stress test and also cardiac catheterization but she does not want to do any kind of testing at this point.  She will continue to want to try the medical therapy.  I asked her to come to the emergency room if the pain doesn't go away and stays and becomes unstable.          Allergies   Allergen Reactions   • Prilosec [Omeprazole] Other (See Comments)     Gives her cramps in legs    • Statins Other (See Comments)     Gives patient muscle and joint pain          Past Medical History:   Diagnosis Date   • Acquired hypothyroidism    • Aortic valve stenosis    • Artificial lens present    • Borderline glaucoma    • Chronic depression    • Chronic pain    • Corneal foreign body     OS   • Coronary arteriosclerosis    • Cortical senile cataract    • Diabetes mellitus     NO RETINOPATHY   • Diverticular disease of colon    • Essential hypertension    • Generalized anxiety  disorder    • GERD (gastroesophageal reflux disease)    • Histoplasmosis with retinitis    • History of bone density study 03/26/2009    DXA BONE DENSITY AXIAL 57542 (Normal for the hips and lumbar region. Hips represent 3.9% improvement.Lumbar represents 1.3 % improvement)   • History of echocardiogram 09/16/2015    Echocardiogram W/ color flow 35134 (Overall LV contractility normal with Ef of 55% with LVH and diastolic relaxation abnormality of the left ventricle.Moderate to severe AV stenosis.Mild mitral regurg.No intracardiac mass or thrombus.)   • History of mammogram 03/26/2009    benign findings    • Hyperlipidemia     not able to take statin     • Joint pain    • Low back pain    • Mammogram declined 2013   • Nonexudative age-related macular degeneration     MILD   • Nuclear cataract    • Peripheral vascular disease    • Posterior subcapsular polar senile cataract    • Primary open angle glaucoma    • Transient ischemic attack involving carotid artery     Carotid territory transient ischemic attack     • Transient visual loss    • Type 2 diabetes mellitus          Past Surgical History:   Procedure Laterality Date   • ANGIOPLASTY AORTIC  2013    Angioplasty, aorta (dilation) (stents of bilateral common iliacarteries)   2013 EMMANUEL STATON    • CATARACT EXTRACTION  12/19/2013    Remove cataract, insert lens (Left eye.)   • CATARACT EXTRACTION  09/20/2006    Remove cataract, insert lens (nuclear cataract, right)   • CATARACT EXTRACTION Bilateral    • COLONOSCOPY  04/25/2012    Colon endoscopy 12161 (Diverticulum in sigmoid colon. Internal & external hemorrhoids found.)   • COLONOSCOPY  05/12/2008    Colon endoscopy (Rectal bleeding. Ischemic colitis (watershed region) w.bleeding w/o infarction. Internal hemorrhoids w/o bleeding)   • CORONARY ARTERY BYPASS GRAFT  01/12/2005    X2   • CRYOTHERAPY  12/12/2011    Destruction of Benign Lesion (1-14) 11806 (Actinic keratosis)    • ENDOSCOPY  04/25/2012    EGD w/ tube  45298 (Normal esophagus. Gastritis in stoamch. Biopsy taken. Normal duodenum. Biopsy taken   • ENDOSCOPY  05/12/2008    EGD with tube (Gastroesophageal reflux w/o esophagitis. 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 w/ rhomboid flap closure)   • EXCISION LESION      Remove nose lesion (1.5 mm punch biopsy,right tip of nose)   • HYSTERECTOMY      Partial hyst (for cervical cancer)   • INJECTION OF MEDICATION  10/04/2012    Kenalog (3)   • OTHER SURGICAL HISTORY  04/23/2013    EXTENDED VISUAL FIELDS STUDY 62706 (Glaucoma, primary open angle)    • OTHER SURGICAL HISTORY  09/16/2013    OCT DISC NFL 32366 (Glaucoma, borderline)   • SPINAL FUSION      Neck spinal fusion         Family History   Problem Relation Age of Onset   • Cirrhosis Mother      LIVER   • Throat cancer Father    • Coronary artery disease Other    • Diabetes Other    • Heart disease Other    • Breast cancer Neg Hx    • Colon cancer Neg Hx    • Endometrial cancer Neg Hx          Social History     Social History   • Marital status:      Spouse name: N/A   • Number of children: N/A   • Years of education: N/A     Occupational History   • Not on file.     Social History Main Topics   • Smoking status: Former Smoker     Packs/day: 1.00     Years: 14.00   • Smokeless tobacco: Never Used      Comment: QUIT AT AGE 35   • Alcohol use No   • Drug use: No   • Sexual activity: Defer     Other Topics Concern   • Not on file     Social History Narrative         Current Outpatient Prescriptions   Medication Sig Dispense Refill   • ALPRAZolam (XANAX) 1 MG tablet Take 1 tablet by mouth At Night As Needed for Anxiety. 90 tablet 0   • amLODIPine (NORVASC) 5 MG tablet Take 1 tablet by mouth Daily. 90 tablet 1   • clopidogrel (PLAVIX) 75 MG tablet Take 1 tablet by mouth Daily. 90 tablet 1    • doxepin (SINEquan) 100 MG capsule Take 1 capsule by mouth Every Night. 90 capsule 1   • famotidine (PEPCID) 20 MG tablet Take 1 tablet by mouth 2 (Two) Times a Day. 180 tablet 1   • GLIPIZIDE XL 5 MG ER tablet Take 1 tablet by mouth Daily. 90 tablet 1   • hydrochlorothiazide (HYDRODIURIL) 25 MG tablet Take 1 tablet by mouth Daily. 90 tablet 1   • HYDROcodone-acetaminophen (NORCO) 5-325 MG per tablet Take 1 tablet by mouth 2 (Two) Times a Day As Needed for Moderate Pain . 60 tablet 0   • isosorbide mononitrate (IMDUR) 30 MG 24 hr tablet Take 1 tablet by mouth Daily. 90 tablet 1   • levothyroxine (SYNTHROID, LEVOTHROID) 88 MCG tablet Take 1 tablet by mouth Daily. 90 tablet 1   • lisinopril (PRINIVIL,ZESTRIL) 2.5 MG tablet Take 1 tablet by mouth Daily. 90 tablet 1   • metoprolol succinate XL (TOPROL-XL) 50 MG 24 hr tablet Take 1 tablet by mouth Daily. 90 tablet 1   • nitroglycerin (NITROSTAT) 0.4 MG SL tablet PLACE 1 TAB UNDER TONGUE EVERY 5 MINS UP TO 3 DOSES FOR CHEST PAIN. IF NO RELIEF GO TO ER. 25 tablet 5   • Omega-3 Fatty Acids (FISH OIL) 1000 MG capsule capsule Take 1 capsule by mouth 3 (Three) Times a Day With Meals. 270 capsule 1     No current facility-administered medications for this visit.          Review of Systems     Constitution: Denies any fatigue, fever or chills    HENT: Vertigo with dizziness    Eyes: Denies any blurring of vision, or photophobia     Cardivascular - As per history of present illness     Respiratory system-denies any COPD, shortness of breath with NYHA class II symptoms        Endocrine:   history of hyperlipidemia, diabetes,                             Musculoskeletal:   history of arthritis with musculoskeletal problems    Gastrointestinal: No nausea, vomiting, or melena    Genitourinary: No dysuria or hematuria    Neurological:   Has peripheral neuropathy    Psychiatric/Behavioral:        No history of depression,      Hematological- no history of easy bruising  "            OBJECTIVE    /78 (BP Location: Left arm, Patient Position: Sitting, Cuff Size: Adult)  Pulse 74  Ht 157.5 cm (62\")  Wt 80 kg (176 lb 5 oz)  LMP 03/07/1971 (Within Months) Comment: Hysterectomy  SpO2 94%  Breastfeeding? No  BMI 32.25 kg/m2      Physical Exam     Constitutional: is oriented to person, place, and time.     Skin-warm and dry    Well developed and nourished in no acute distress      Head: Normocephalic and atraumatic.     Eyes: Pupils are equal, round    Neck: Neck supple. No bruit in the carotids,    Cardiovascular: Lambert in the fifth intercostal space   Regular rate, and  Rhythm,    S1 greater than S2, no S3 or S4, no gallop     Pulmonary/Chest:   Air  Entry is equal on both sides  No wheezing or crackles,      Abdominal: Soft.  No hepatosplenomegaly, bowel sounds are present    Musculoskeletal: No kyphoscoliosis,     Neurological: is alert and oriented to person, place, and time.    cranial nerve are intact .   No motor or sensory deficit    Extremities-no edema, no radial femoral delay      Psychiatric: He has a normal mood and affect.                  His behavior is normal.           Procedures      A/P    CAD status post CABG with chronic stable angina-on maximum medical therapy possible, not on Ranexa due to high cost.  She uses nitroglycerin 0.4 mg every other day for chest pain and that is helping her.  I discussed the option of stress test or coronary angiogram but she does not want to do anything at this point.   if it becomes unstable to come to the emergency room    Hypertension well controlled with amlodipine, HCTZ, isosorbide and Toprol-XL.    Diabetes on metformin and last A1c was good.      Cannot tolerate statins or any cholesterol medicines even though her cholesterol is high.     GERD on Pepcid .    she will follow with me in 6 months      Alfa Whalen MD  3/7/2018  2:44 PM    EMR Dragon/Transcription disclaimer:   Some of this note may be an " electronic transcription/translation of spoken language to printed text. The electronic translation of spoken language may permit erroneous, or at times, nonsensical words or phrases to be inadvertently transcribed; Although I have reviewed the note for such errors, some may still exist.

## 2018-03-14 RX ORDER — HYDROCODONE BITARTRATE AND ACETAMINOPHEN 5; 325 MG/1; MG/1
1 TABLET ORAL 2 TIMES DAILY PRN
Qty: 60 TABLET | Refills: 0 | Status: SHIPPED | OUTPATIENT
Start: 2018-03-14 | End: 2018-04-16 | Stop reason: SDUPTHER

## 2018-04-04 DIAGNOSIS — I10 ESSENTIAL HYPERTENSION: ICD-10-CM

## 2018-04-05 RX ORDER — ISOSORBIDE MONONITRATE 30 MG/1
TABLET, EXTENDED RELEASE ORAL
Qty: 90 TABLET | Refills: 1 | Status: SHIPPED | OUTPATIENT
Start: 2018-04-05 | End: 2019-02-21 | Stop reason: SDUPTHER

## 2018-04-05 RX ORDER — GLIPIZIDE 5 MG/1
TABLET, FILM COATED, EXTENDED RELEASE ORAL
Qty: 90 TABLET | Refills: 1 | Status: SHIPPED | OUTPATIENT
Start: 2018-04-05 | End: 2018-06-06

## 2018-04-09 DIAGNOSIS — I10 ESSENTIAL HYPERTENSION: ICD-10-CM

## 2018-04-09 RX ORDER — METOPROLOL SUCCINATE 50 MG/1
TABLET, EXTENDED RELEASE ORAL
Qty: 90 TABLET | Refills: 1 | Status: SHIPPED | OUTPATIENT
Start: 2018-04-09 | End: 2019-02-21 | Stop reason: SDUPTHER

## 2018-04-09 RX ORDER — LISINOPRIL 2.5 MG/1
TABLET ORAL
Qty: 90 TABLET | Refills: 1 | Status: SHIPPED | OUTPATIENT
Start: 2018-04-09 | End: 2018-12-11 | Stop reason: SDUPTHER

## 2018-04-16 RX ORDER — HYDROCODONE BITARTRATE AND ACETAMINOPHEN 5; 325 MG/1; MG/1
1 TABLET ORAL 2 TIMES DAILY PRN
Qty: 60 TABLET | Refills: 0 | Status: SHIPPED | OUTPATIENT
Start: 2018-04-16 | End: 2018-05-03

## 2018-04-30 DIAGNOSIS — I10 ESSENTIAL HYPERTENSION: ICD-10-CM

## 2018-05-02 RX ORDER — HYDROCHLOROTHIAZIDE 25 MG/1
TABLET ORAL
Qty: 90 TABLET | Refills: 1 | Status: SHIPPED | OUTPATIENT
Start: 2018-05-02 | End: 2018-08-24 | Stop reason: SDUPTHER

## 2018-05-03 ENCOUNTER — OFFICE VISIT (OUTPATIENT)
Dept: FAMILY MEDICINE CLINIC | Facility: CLINIC | Age: 83
End: 2018-05-03

## 2018-05-03 VITALS
DIASTOLIC BLOOD PRESSURE: 68 MMHG | TEMPERATURE: 97.4 F | OXYGEN SATURATION: 98 % | HEIGHT: 62 IN | HEART RATE: 71 BPM | SYSTOLIC BLOOD PRESSURE: 136 MMHG | WEIGHT: 175 LBS | BODY MASS INDEX: 32.2 KG/M2

## 2018-05-03 DIAGNOSIS — M54.50 ACUTE BILATERAL LOW BACK PAIN WITHOUT SCIATICA: Primary | ICD-10-CM

## 2018-05-03 DIAGNOSIS — M54.50 CHRONIC MIDLINE LOW BACK PAIN WITHOUT SCIATICA: ICD-10-CM

## 2018-05-03 DIAGNOSIS — E66.09 CLASS 1 OBESITY DUE TO EXCESS CALORIES WITHOUT SERIOUS COMORBIDITY WITH BODY MASS INDEX (BMI) OF 32.0 TO 32.9 IN ADULT: ICD-10-CM

## 2018-05-03 DIAGNOSIS — G89.29 CHRONIC MIDLINE LOW BACK PAIN WITHOUT SCIATICA: ICD-10-CM

## 2018-05-03 DIAGNOSIS — M19.011 PRIMARY OSTEOARTHRITIS OF RIGHT SHOULDER: ICD-10-CM

## 2018-05-03 PROBLEM — Z00.00 MEDICARE ANNUAL WELLNESS VISIT, INITIAL: Status: RESOLVED | Noted: 2017-08-17 | Resolved: 2018-05-03

## 2018-05-03 PROCEDURE — 96372 THER/PROPH/DIAG INJ SC/IM: CPT | Performed by: FAMILY MEDICINE

## 2018-05-03 PROCEDURE — 99213 OFFICE O/P EST LOW 20 MIN: CPT | Performed by: FAMILY MEDICINE

## 2018-05-03 RX ORDER — CYCLOBENZAPRINE HCL 10 MG
10 TABLET ORAL 3 TIMES DAILY PRN
Qty: 30 TABLET | Refills: 0 | Status: SHIPPED | OUTPATIENT
Start: 2018-05-03 | End: 2018-09-04 | Stop reason: HOSPADM

## 2018-05-03 RX ORDER — HYDROCODONE BITARTRATE AND ACETAMINOPHEN 7.5; 325 MG/1; MG/1
1 TABLET ORAL 2 TIMES DAILY PRN
Qty: 60 TABLET | Refills: 0 | Status: SHIPPED | OUTPATIENT
Start: 2018-05-03 | End: 2018-06-06 | Stop reason: SDUPTHER

## 2018-05-03 RX ORDER — HYDROCODONE BITARTRATE AND ACETAMINOPHEN 5; 325 MG/1; MG/1
1 TABLET ORAL 2 TIMES DAILY PRN
Qty: 60 TABLET | Refills: 0 | Status: CANCELLED | OUTPATIENT
Start: 2018-05-03

## 2018-05-03 RX ORDER — ALPRAZOLAM 1 MG/1
1 TABLET ORAL NIGHTLY PRN
Qty: 90 TABLET | Refills: 0 | Status: SHIPPED | OUTPATIENT
Start: 2018-05-03 | End: 2018-09-06 | Stop reason: SDUPTHER

## 2018-05-03 NOTE — PROGRESS NOTES
"Subjective   Chief Complaint   Patient presents with   • Back Pain     Started hurting 4-19-18   • Med Refill     hydrocodone last dose this morning     Tatyana Yeager is a 83 y.o. female.   Back Pain (Started hurting 4-19-18) and Med Refill (hydrocodone last dose this morning)    History of Present Illness     Presents today with acute episode of her chronic back pain  While she was showering she had bent over while she was washing her feet  She noticed sudden onset of back pain the next day  She was seen in the urgent care for the acute back pain  She was treated with steroid injection, robaxin  She denies improvement in the back pain with those medications and admitted to taking more of her narcotic prescription  Pain rated 8/10  Located in the lower back with radiation to right and left lower back  Improved by muscle rub or massage  Worsened by standing, bending  Pain described as \"it just hurts so bad\"     Additionally complaining of right shoulder pain  Xray indicated osteoarthritis    The following portions of the patient's history were reviewed and updated as appropriate: allergies, current medications, past family history, past medical history, past social history, past surgical history and problem list.    Review of Systems   Constitutional: Negative for appetite change, chills, fatigue and fever.   HENT: Negative for congestion, ear pain, rhinorrhea and sore throat.    Eyes: Negative for pain.   Respiratory: Negative for cough and shortness of breath.    Cardiovascular: Negative for chest pain and palpitations.   Gastrointestinal: Negative for abdominal pain, constipation, diarrhea and nausea.   Genitourinary: Negative for dysuria.   Musculoskeletal: Positive for arthralgias and back pain. Negative for joint swelling and neck pain.   Skin: Negative for rash.   Neurological: Negative for dizziness and headaches.       Objective   /68   Pulse 71   Temp 97.4 °F (36.3 °C)   Ht 157.5 cm (62.01\")   " Wt 79.4 kg (175 lb)   LMP 03/07/1971 (Within Months) Comment: Hysterectomy  SpO2 98%   BMI 32.00 kg/m²   Physical Exam   Constitutional: She is oriented to person, place, and time. She appears well-developed and well-nourished.   HENT:   Head: Normocephalic and atraumatic.   Eyes: Pupils are equal, round, and reactive to light.   Neck: Normal range of motion. Neck supple.   Cardiovascular: Normal rate, regular rhythm and normal heart sounds.    Pulmonary/Chest: Effort normal and breath sounds normal. No respiratory distress. She has no wheezes. She has no rales.   Abdominal: Soft. Bowel sounds are normal.   Musculoskeletal:        Lumbar back: She exhibits decreased range of motion and pain. She exhibits no tenderness and no bony tenderness.   Neurological: She is alert and oriented to person, place, and time.   Skin: Skin is warm and dry.   Psychiatric: She has a normal mood and affect.   Nursing note and vitals reviewed.      Assessment/Plan   Problems Addressed this Visit        Digestive    Class 1 obesity due to excess calories without serious comorbidity with body mass index (BMI) of 32.0 to 32.9 in adult       Nervous and Auditory    Chronic midline low back pain without sciatica    Acute bilateral low back pain without sciatica - Primary    Relevant Medications    ketorolac (TORADOL) injection 30 mg (Completed) (Start on 5/3/2018  4:00 PM)       Musculoskeletal and Integument    Primary osteoarthritis of right shoulder      Other Visit Diagnoses    None.       Refilled and adjusted norco  toradol IM  Stop robaxin  Start relafen  Educated on use of narcotic medication  Understands that she needs to take this medication as prescribed  The patient has read and signed the [place] Controlled Substance Contract.  I will continue to see patient for regular follow up appointments.  They are well controlled on their medication.  DONAL is updated every 3 months. The patient is aware of the potential for  addiction and dependence.    Patient's Body mass index is 32 kg/m². BMI is above normal parameters. Follow-up plan includes:  exercise counseling and nutrition counseling.    Recheck in 4 weeks for a regular follow up

## 2018-06-06 ENCOUNTER — OFFICE VISIT (OUTPATIENT)
Dept: FAMILY MEDICINE CLINIC | Facility: CLINIC | Age: 83
End: 2018-06-06

## 2018-06-06 ENCOUNTER — LAB (OUTPATIENT)
Dept: LAB | Facility: OTHER | Age: 83
End: 2018-06-06

## 2018-06-06 VITALS
OXYGEN SATURATION: 98 % | HEART RATE: 74 BPM | HEIGHT: 62 IN | BODY MASS INDEX: 32.02 KG/M2 | DIASTOLIC BLOOD PRESSURE: 64 MMHG | SYSTOLIC BLOOD PRESSURE: 122 MMHG | WEIGHT: 174 LBS | TEMPERATURE: 97.8 F

## 2018-06-06 DIAGNOSIS — E11.9 DIABETES MELLITUS WITHOUT COMPLICATION (HCC): ICD-10-CM

## 2018-06-06 DIAGNOSIS — E03.9 ACQUIRED HYPOTHYROIDISM: ICD-10-CM

## 2018-06-06 DIAGNOSIS — I10 ESSENTIAL HYPERTENSION: ICD-10-CM

## 2018-06-06 DIAGNOSIS — Z23 NEED FOR SHINGLES VACCINE: ICD-10-CM

## 2018-06-06 DIAGNOSIS — E66.09 CLASS 1 OBESITY DUE TO EXCESS CALORIES WITHOUT SERIOUS COMORBIDITY WITH BODY MASS INDEX (BMI) OF 31.0 TO 31.9 IN ADULT: ICD-10-CM

## 2018-06-06 DIAGNOSIS — E11.9 DIABETES MELLITUS WITHOUT COMPLICATION (HCC): Primary | ICD-10-CM

## 2018-06-06 DIAGNOSIS — E78.2 MIXED HYPERLIPIDEMIA: ICD-10-CM

## 2018-06-06 DIAGNOSIS — K21.9 GASTROESOPHAGEAL REFLUX DISEASE WITHOUT ESOPHAGITIS: ICD-10-CM

## 2018-06-06 PROBLEM — M54.50 ACUTE BILATERAL LOW BACK PAIN WITHOUT SCIATICA: Status: RESOLVED | Noted: 2018-05-03 | Resolved: 2018-06-06

## 2018-06-06 LAB
ALBUMIN SERPL-MCNC: 4.2 G/DL (ref 3.2–5.5)
ALBUMIN/GLOB SERPL: 1.2 G/DL (ref 1–3)
ALP SERPL-CCNC: 52 U/L (ref 15–121)
ALT SERPL W P-5'-P-CCNC: 15 U/L (ref 10–60)
ANION GAP SERPL CALCULATED.3IONS-SCNC: 9 MMOL/L (ref 5–15)
AST SERPL-CCNC: 19 U/L (ref 10–60)
BASOPHILS # BLD AUTO: 0.04 10*3/MM3 (ref 0–0.2)
BASOPHILS NFR BLD AUTO: 0.5 % (ref 0–2)
BILIRUB SERPL-MCNC: 0.5 MG/DL (ref 0.2–1)
BUN BLD-MCNC: 37 MG/DL (ref 8–25)
BUN/CREAT SERPL: 21.8 (ref 7–25)
CALCIUM SPEC-SCNC: 9.6 MG/DL (ref 8.4–10.8)
CHLORIDE SERPL-SCNC: 101 MMOL/L (ref 100–112)
CHOLEST SERPL-MCNC: 324 MG/DL (ref 150–200)
CO2 SERPL-SCNC: 27 MMOL/L (ref 20–32)
CREAT BLD-MCNC: 1.7 MG/DL (ref 0.4–1.3)
DEPRECATED RDW RBC AUTO: 43 FL (ref 36.4–46.3)
EOSINOPHIL # BLD AUTO: 0.23 10*3/MM3 (ref 0–0.7)
EOSINOPHIL NFR BLD AUTO: 2.8 % (ref 0–7)
ERYTHROCYTE [DISTWIDTH] IN BLOOD BY AUTOMATED COUNT: 13 % (ref 11.5–14.5)
GFR SERPL CREATININE-BSD FRML MDRD: 29 ML/MIN/1.73 (ref 39–90)
GLOBULIN UR ELPH-MCNC: 3.6 GM/DL (ref 2.5–4.6)
GLUCOSE BLD-MCNC: 96 MG/DL (ref 70–100)
HCT VFR BLD AUTO: 38.1 % (ref 35–45)
HDLC SERPL-MCNC: 49 MG/DL (ref 35–100)
HGB BLD-MCNC: 12.7 G/DL (ref 12–15.5)
LDLC SERPL CALC-MCNC: 241 MG/DL
LDLC/HDLC SERPL: 4.93 {RATIO}
LYMPHOCYTES # BLD AUTO: 2.61 10*3/MM3 (ref 0.6–4.2)
LYMPHOCYTES NFR BLD AUTO: 31.3 % (ref 10–50)
MCH RBC QN AUTO: 31.2 PG (ref 26.5–34)
MCHC RBC AUTO-ENTMCNC: 33.3 G/DL (ref 31.4–36)
MCV RBC AUTO: 93.6 FL (ref 80–98)
MONOCYTES # BLD AUTO: 0.64 10*3/MM3 (ref 0–0.9)
MONOCYTES NFR BLD AUTO: 7.7 % (ref 0–12)
NEUTROPHILS # BLD AUTO: 4.82 10*3/MM3 (ref 2–8.6)
NEUTROPHILS NFR BLD AUTO: 57.7 % (ref 37–80)
PLATELET # BLD AUTO: 215 10*3/MM3 (ref 150–450)
PMV BLD AUTO: 9.9 FL (ref 8–12)
POTASSIUM BLD-SCNC: 4.4 MMOL/L (ref 3.4–5.4)
PROT SERPL-MCNC: 7.8 G/DL (ref 6.7–8.2)
RBC # BLD AUTO: 4.07 10*6/MM3 (ref 3.77–5.16)
SODIUM BLD-SCNC: 137 MMOL/L (ref 134–146)
TRIGL SERPL-MCNC: 168 MG/DL (ref 35–160)
TSH SERPL DL<=0.05 MIU/L-ACNC: 4.31 MIU/ML (ref 0.46–4.68)
VLDLC SERPL-MCNC: 33.6 MG/DL
WBC NRBC COR # BLD: 8.34 10*3/MM3 (ref 3.2–9.8)

## 2018-06-06 PROCEDURE — 85025 COMPLETE CBC W/AUTO DIFF WBC: CPT | Performed by: FAMILY MEDICINE

## 2018-06-06 PROCEDURE — 80053 COMPREHEN METABOLIC PANEL: CPT | Performed by: FAMILY MEDICINE

## 2018-06-06 PROCEDURE — 99214 OFFICE O/P EST MOD 30 MIN: CPT | Performed by: FAMILY MEDICINE

## 2018-06-06 PROCEDURE — 36415 COLL VENOUS BLD VENIPUNCTURE: CPT | Performed by: FAMILY MEDICINE

## 2018-06-06 PROCEDURE — 84443 ASSAY THYROID STIM HORMONE: CPT | Performed by: FAMILY MEDICINE

## 2018-06-06 PROCEDURE — 80061 LIPID PANEL: CPT | Performed by: FAMILY MEDICINE

## 2018-06-06 RX ORDER — FAMOTIDINE 20 MG/1
20 TABLET, FILM COATED ORAL 2 TIMES DAILY PRN
Qty: 180 TABLET | Refills: 1 | Status: SHIPPED | OUTPATIENT
Start: 2018-06-06 | End: 2018-10-30 | Stop reason: SDUPTHER

## 2018-06-06 RX ORDER — HYDROCODONE BITARTRATE AND ACETAMINOPHEN 7.5; 325 MG/1; MG/1
1 TABLET ORAL 2 TIMES DAILY PRN
Qty: 60 TABLET | Refills: 0 | Status: CANCELLED | OUTPATIENT
Start: 2018-06-06

## 2018-06-06 RX ORDER — LEVOTHYROXINE SODIUM 88 UG/1
88 TABLET ORAL DAILY
Qty: 90 TABLET | Refills: 1 | Status: CANCELLED | OUTPATIENT
Start: 2018-06-06

## 2018-06-06 RX ORDER — BLOOD-GLUCOSE METER
KIT MISCELLANEOUS
Qty: 1 EACH | Refills: 0 | Status: SHIPPED | OUTPATIENT
Start: 2018-06-06 | End: 2020-01-01 | Stop reason: SDUPTHER

## 2018-06-06 RX ORDER — LANCETS 30 GAUGE
EACH MISCELLANEOUS
Qty: 100 EACH | Refills: 3 | Status: SHIPPED | OUTPATIENT
Start: 2018-06-06 | End: 2018-06-21 | Stop reason: SDUPTHER

## 2018-06-06 RX ORDER — HYDROCODONE BITARTRATE AND ACETAMINOPHEN 7.5; 325 MG/1; MG/1
1 TABLET ORAL 2 TIMES DAILY PRN
Qty: 60 TABLET | Refills: 0 | Status: SHIPPED | OUTPATIENT
Start: 2018-06-06 | End: 2018-07-13 | Stop reason: SDUPTHER

## 2018-06-06 NOTE — PROGRESS NOTES
Subjective   Chief Complaint   Patient presents with   • Back Pain     4 weeks   • Med Refill     hydrocodone last dose this morning     Tatyana Yeager is a 83 y.o. female.   Back Pain (4 weeks) and Med Refill (hydrocodone last dose this morning)    History of Present Illness     cmh - chronic back pain, insomnia, gerd, hypertension, hypothyroidism, diabetes    Chronic back pain with DDD - controlled with norco  No reported side effects with medication  Previously followed by Dr Goodwin  Presenting today for a refill    Hypertension, PVD, VAMSI, stents placed, AS, MR, CABG in 2005 - managed with imdur, metoprolol, lisinopril, norvasc, hctz  Anticoagulated with plavix  Followed by Dr Grimm    Diabetes - controlled with glipizide  She reports daily concerns about hypoglycemia  She is in need of a glucometer, test strips and lancets  Stopped metformin due to elevated serum creatinine  Last bmp checked in Nov - Cr was 1.2  Average fasting blood glucose around 100  Due for diabetic lab work  Asking for a script for diabetic shoes    Anxiety - controlled with xanax PRN    Hypothyroidism - controlled with synthroid  Due for labs    The following portions of the patient's history were reviewed and updated as appropriate: allergies, current medications, past family history, past medical history, past social history, past surgical history and problem list.    Review of Systems   Constitutional: Negative for appetite change, chills, fatigue and fever.   HENT: Negative for congestion, ear pain, rhinorrhea and sore throat.    Eyes: Negative for pain.   Respiratory: Negative for cough and shortness of breath.    Cardiovascular: Negative for chest pain and palpitations.   Gastrointestinal: Negative for abdominal pain, constipation, diarrhea and nausea.   Genitourinary: Negative for dysuria.   Musculoskeletal: Negative for back pain, joint swelling and neck pain.   Skin: Negative for rash.   Neurological: Negative for dizziness  "and headaches.       Objective   /64   Pulse 74   Temp 97.8 °F (36.6 °C)   Ht 157.5 cm (62.01\")   Wt 78.9 kg (174 lb)   LMP 03/07/1971 (Within Months) Comment: Hysterectomy  SpO2 98%   BMI 31.82 kg/m²   Physical Exam   Constitutional: She is oriented to person, place, and time. She appears well-developed and well-nourished.   HENT:   Head: Normocephalic and atraumatic.   Eyes: Pupils are equal, round, and reactive to light.   Neck: Normal range of motion. Neck supple.   Cardiovascular: Normal rate, regular rhythm and normal heart sounds.    Pulmonary/Chest: Effort normal and breath sounds normal. No respiratory distress. She has no wheezes. She has no rales.   Abdominal: Soft. Bowel sounds are normal.   Central obesity   Musculoskeletal: Normal range of motion.   Neurological: She is alert and oriented to person, place, and time.   Skin: Skin is warm and dry.   Psychiatric: She has a normal mood and affect.   Nursing note and vitals reviewed.      Assessment/Plan   Problems Addressed this Visit        Cardiovascular and Mediastinum    Essential hypertension    Hyperlipidemia    Relevant Orders    Lipid Panel       Digestive    Gastroesophageal reflux disease without esophagitis    Relevant Medications    famotidine (PEPCID) 20 MG tablet    Class 1 obesity due to excess calories without serious comorbidity with body mass index (BMI) of 31.0 to 31.9 in adult       Endocrine    Diabetes mellitus without complication - Primary    Relevant Medications    glucose monitor monitoring kit    glucose blood test strip    Lancets misc    Other Relevant Orders    TSH    CBC & Differential (Completed)    Comprehensive Metabolic Panel    Acquired hypothyroidism      Other Visit Diagnoses     Need for shingles vaccine            Labs ordered    Refilled medications    Stop glipizide  Start blood glucose monitoring daily  Return to the office with meter  New glucometer, test strips and lancets    Patient's Body mass " index is 31.82 kg/m². BMI is above normal parameters. Recommendations include: exercise counseling and nutrition counseling.    The patient has read and signed the [facility] Controlled Substance Contract.  I will continue to see patient for regular follow up appointments.  They are well controlled on their medication.  DONAL is updated every 3 months. The patient is aware of the potential for addiction and dependence.  Refilled norco    Shingles vaccine script provided    Diabetic shoe script provided    Recheck in 4 weeks

## 2018-06-07 ENCOUNTER — TELEPHONE (OUTPATIENT)
Dept: FAMILY MEDICINE CLINIC | Facility: CLINIC | Age: 83
End: 2018-06-07

## 2018-06-07 DIAGNOSIS — E03.9 ACQUIRED HYPOTHYROIDISM: ICD-10-CM

## 2018-06-07 RX ORDER — LEVOTHYROXINE SODIUM 88 UG/1
88 TABLET ORAL DAILY
Qty: 90 TABLET | Refills: 1 | Status: SHIPPED | OUTPATIENT
Start: 2018-06-07 | End: 2018-10-30 | Stop reason: SDUPTHER

## 2018-06-07 NOTE — TELEPHONE ENCOUNTER
----- Message from Sri Mon MD sent at 6/7/2018 12:27 PM CDT -----  Thyroid is normal.  Continue current dose.  I sent in a refill to mail order of thyroid med. Creatinine is elevated - continue with discontinuation of glipizide - drink plenty of fluids, recheck bmp in 1 week.

## 2018-06-08 ENCOUNTER — TELEPHONE (OUTPATIENT)
Dept: FAMILY MEDICINE CLINIC | Facility: CLINIC | Age: 83
End: 2018-06-08

## 2018-06-08 NOTE — TELEPHONE ENCOUNTER
Called pt and told her of results. She said that she cant take any of the statins so she is coming in to talk to the Dr about other options. 6/8/18

## 2018-06-08 NOTE — TELEPHONE ENCOUNTER
----- Message from Sri Mon MD sent at 6/7/2018 12:28 PM CDT -----  Cholesterol is elevated - worsened from previous reading.  I would recommend starting a statin - simvastatin or pravastatin.  If she wants to return to the office to discuss treatment options she can make an appointment otherwise we can send in a script to the pharmacy.

## 2018-06-12 DIAGNOSIS — E78.2 MIXED HYPERLIPIDEMIA: ICD-10-CM

## 2018-06-12 DIAGNOSIS — I10 ESSENTIAL HYPERTENSION: ICD-10-CM

## 2018-06-12 DIAGNOSIS — G47.00 INSOMNIA, UNSPECIFIED TYPE: ICD-10-CM

## 2018-06-12 RX ORDER — CHLORAL HYDRATE 500 MG
CAPSULE ORAL
Qty: 270 CAPSULE | Refills: 1 | Status: SHIPPED | OUTPATIENT
Start: 2018-06-12 | End: 2018-06-21 | Stop reason: SDUPTHER

## 2018-06-12 RX ORDER — AMLODIPINE BESYLATE 5 MG/1
TABLET ORAL
Qty: 90 TABLET | Refills: 1 | Status: SHIPPED | OUTPATIENT
Start: 2018-06-12 | End: 2018-10-30 | Stop reason: SDUPTHER

## 2018-06-12 RX ORDER — DOXEPIN HYDROCHLORIDE 100 MG/1
CAPSULE ORAL
Qty: 90 CAPSULE | Refills: 1 | Status: SHIPPED | OUTPATIENT
Start: 2018-06-12 | End: 2018-10-30 | Stop reason: SDUPTHER

## 2018-06-12 RX ORDER — CLOPIDOGREL BISULFATE 75 MG/1
TABLET ORAL
Qty: 90 TABLET | Refills: 1 | Status: SHIPPED | OUTPATIENT
Start: 2018-06-12 | End: 2018-10-30 | Stop reason: SDUPTHER

## 2018-06-18 ENCOUNTER — LAB (OUTPATIENT)
Dept: LAB | Facility: OTHER | Age: 83
End: 2018-06-18

## 2018-06-18 DIAGNOSIS — R89.9 ABNORMAL LABORATORY TEST: Primary | ICD-10-CM

## 2018-06-18 DIAGNOSIS — R89.9 ABNORMAL LABORATORY TEST: ICD-10-CM

## 2018-06-18 LAB
ANION GAP SERPL CALCULATED.3IONS-SCNC: 12 MMOL/L (ref 5–15)
BUN BLD-MCNC: 29 MG/DL (ref 8–25)
BUN/CREAT SERPL: 19.3 (ref 7–25)
CALCIUM SPEC-SCNC: 9.8 MG/DL (ref 8.4–10.8)
CHLORIDE SERPL-SCNC: 99 MMOL/L (ref 100–112)
CO2 SERPL-SCNC: 28 MMOL/L (ref 20–32)
CREAT BLD-MCNC: 1.5 MG/DL (ref 0.4–1.3)
GFR SERPL CREATININE-BSD FRML MDRD: 33 ML/MIN/1.73 (ref 39–90)
GLUCOSE BLD-MCNC: 125 MG/DL (ref 70–100)
POTASSIUM BLD-SCNC: 3.9 MMOL/L (ref 3.4–5.4)
SODIUM BLD-SCNC: 139 MMOL/L (ref 134–146)

## 2018-06-18 PROCEDURE — 80048 BASIC METABOLIC PNL TOTAL CA: CPT | Performed by: FAMILY MEDICINE

## 2018-06-18 PROCEDURE — 36415 COLL VENOUS BLD VENIPUNCTURE: CPT | Performed by: FAMILY MEDICINE

## 2018-06-21 ENCOUNTER — OFFICE VISIT (OUTPATIENT)
Dept: FAMILY MEDICINE CLINIC | Facility: CLINIC | Age: 83
End: 2018-06-21

## 2018-06-21 VITALS
OXYGEN SATURATION: 98 % | DIASTOLIC BLOOD PRESSURE: 78 MMHG | BODY MASS INDEX: 32.2 KG/M2 | HEART RATE: 56 BPM | HEIGHT: 62 IN | WEIGHT: 175 LBS | SYSTOLIC BLOOD PRESSURE: 126 MMHG

## 2018-06-21 DIAGNOSIS — I10 ESSENTIAL HYPERTENSION: ICD-10-CM

## 2018-06-21 DIAGNOSIS — E11.69 HYPERLIPIDEMIA ASSOCIATED WITH TYPE 2 DIABETES MELLITUS (HCC): ICD-10-CM

## 2018-06-21 DIAGNOSIS — E11.9 DIABETES MELLITUS WITHOUT COMPLICATION (HCC): ICD-10-CM

## 2018-06-21 DIAGNOSIS — E78.5 HYPERLIPIDEMIA ASSOCIATED WITH TYPE 2 DIABETES MELLITUS (HCC): ICD-10-CM

## 2018-06-21 DIAGNOSIS — Z71.89 ENCOUNTER FOR DIABETES EDUCATION: ICD-10-CM

## 2018-06-21 DIAGNOSIS — E78.2 MIXED HYPERLIPIDEMIA: ICD-10-CM

## 2018-06-21 DIAGNOSIS — R79.89 ELEVATED SERUM CREATININE: Primary | ICD-10-CM

## 2018-06-21 PROCEDURE — 99214 OFFICE O/P EST MOD 30 MIN: CPT | Performed by: FAMILY MEDICINE

## 2018-06-21 RX ORDER — LANCETS 30 GAUGE
EACH MISCELLANEOUS
Qty: 100 EACH | Refills: 3 | Status: SHIPPED | OUTPATIENT
Start: 2018-06-21 | End: 2020-01-01 | Stop reason: SDUPTHER

## 2018-06-21 RX ORDER — CHLORAL HYDRATE 500 MG
2000 CAPSULE ORAL
Qty: 270 CAPSULE | Refills: 1
Start: 2018-06-21 | End: 2020-03-20 | Stop reason: ALTCHOICE

## 2018-06-21 RX ORDER — EZETIMIBE 10 MG/1
10 TABLET ORAL DAILY
Qty: 30 TABLET | Refills: 0 | Status: SHIPPED | OUTPATIENT
Start: 2018-06-21 | End: 2019-02-21 | Stop reason: SDUPTHER

## 2018-06-21 NOTE — PROGRESS NOTES
Subjective   Chief Complaint   Patient presents with   • Results     labs   • Med Refill     lancets, pen needles     Tatyana Yeager is a 83 y.o. female.   Results (labs) and Med Refill (lancets, pen needles)    History of Present Illness     [facility] - chronic back pain, insomnia, gerd, hypertension, hypothyroidism, diabetes    Presents today due to abnormal lab work indicating elevated serum creatinine  She was additionally told to hold her glipizide due to hypoglycemic complaints  During the week she had increased her water intake and repeated lab work for her visit today  She is concerned about her kidney function and her diabetes  Her serum creatinine was at 1.7 on 6/6/18 and improved to 1.5 on 6/18/18  Her blood glucose average readings are 130  Denies polydipsia, polyphagia, and polyuria    Lab work indicated worsening lipids - total cholesterol, LDL, and triglycerides above goal  Previously treated with lipitor, zocor but was intolerable  Currently takes fish oil 2gm daily    Hypertension, PVD, VAMSI, stents placed, AS, MR, CABG in 2005 - managed with imdur, metoprolol, lisinopril, norvasc, hctz  Anticoagulated with plavix  Followed by Dr Grimm    Diabetes - currently managed with diet  Due to concerns about hypoglycemia she has held her glipizide  Average blood glucose readings are around 130  She recently picked up a new glucometer - embrace  She is asking for testing supplies to be sent to [facility] pharmacy  She was discontinued from metformin due to elevated serum creatinine    The following portions of the patient's history were reviewed and updated as appropriate: allergies, current medications, past family history, past medical history, past social history, past surgical history and problem list.    Review of Systems   Constitutional: Negative for appetite change, chills, fatigue and fever.   HENT: Negative for congestion, ear pain, rhinorrhea and sore throat.    Eyes: Negative for pain.  "  Respiratory: Negative for cough and shortness of breath.    Cardiovascular: Negative for chest pain and palpitations.   Gastrointestinal: Negative for abdominal pain, constipation, diarrhea and nausea.   Endocrine: Negative for polydipsia, polyphagia and polyuria.   Genitourinary: Negative for dysuria.   Musculoskeletal: Negative for back pain, joint swelling and neck pain.   Skin: Negative for rash.   Neurological: Negative for dizziness and headaches.       Objective   /78   Pulse 56   Ht 157.5 cm (62\")   Wt 79.4 kg (175 lb)   LMP 03/07/1971 (Within Months) Comment: Hysterectomy  SpO2 98%   BMI 32.01 kg/m²   Physical Exam   Constitutional: She is oriented to person, place, and time. She appears well-developed and well-nourished.   HENT:   Head: Normocephalic and atraumatic.   Eyes: Pupils are equal, round, and reactive to light.   Neurological: She is alert and oriented to person, place, and time.   Skin: Skin is warm and dry.   Psychiatric: She has a normal mood and affect.   Nursing note and vitals reviewed.      Assessment/Plan   Problems Addressed this Visit        Cardiovascular and Mediastinum    Essential hypertension    Hyperlipidemia    Relevant Medications    ezetimibe (ZETIA) 10 MG tablet    Omega-3 Fatty Acids (FISH OIL) 1000 MG capsule capsule       Endocrine    Diabetes mellitus without complication    Relevant Medications    Lancets misc    glucose blood test strip      Other Visit Diagnoses     Elevated serum creatinine    -  Primary    Relevant Orders    Basic metabolic panel    Hyperlipidemia associated with type 2 diabetes mellitus        Relevant Medications    ezetimibe (ZETIA) 10 MG tablet    Encounter for diabetes education            Reviewed lab results  Continue to hold glipizide  Educated on diabetic diet  Educational material provided on diabetes and diet  Recheck lab work in 2-3 weeks - ordered  Continue with good fluid intake  Recheck recommended in 2-3 weeks         "

## 2018-07-13 ENCOUNTER — OFFICE VISIT (OUTPATIENT)
Dept: FAMILY MEDICINE CLINIC | Facility: CLINIC | Age: 83
End: 2018-07-13

## 2018-07-13 ENCOUNTER — LAB (OUTPATIENT)
Dept: LAB | Facility: OTHER | Age: 83
End: 2018-07-13

## 2018-07-13 VITALS
TEMPERATURE: 98.1 F | DIASTOLIC BLOOD PRESSURE: 72 MMHG | WEIGHT: 173.4 LBS | HEART RATE: 74 BPM | SYSTOLIC BLOOD PRESSURE: 132 MMHG | BODY MASS INDEX: 31.91 KG/M2 | OXYGEN SATURATION: 98 % | HEIGHT: 62 IN

## 2018-07-13 DIAGNOSIS — M51.36 DDD (DEGENERATIVE DISC DISEASE), LUMBAR: ICD-10-CM

## 2018-07-13 DIAGNOSIS — G89.29 CHRONIC MIDLINE LOW BACK PAIN WITHOUT SCIATICA: ICD-10-CM

## 2018-07-13 DIAGNOSIS — E66.09 CLASS 1 OBESITY DUE TO EXCESS CALORIES WITHOUT SERIOUS COMORBIDITY WITH BODY MASS INDEX (BMI) OF 31.0 TO 31.9 IN ADULT: ICD-10-CM

## 2018-07-13 DIAGNOSIS — M54.50 CHRONIC MIDLINE LOW BACK PAIN WITHOUT SCIATICA: ICD-10-CM

## 2018-07-13 DIAGNOSIS — E11.9 DIABETES MELLITUS WITHOUT COMPLICATION (HCC): Primary | ICD-10-CM

## 2018-07-13 DIAGNOSIS — I10 ESSENTIAL HYPERTENSION: ICD-10-CM

## 2018-07-13 DIAGNOSIS — R79.89 ELEVATED SERUM CREATININE: ICD-10-CM

## 2018-07-13 LAB
ANION GAP SERPL CALCULATED.3IONS-SCNC: 11 MMOL/L (ref 5–15)
BUN BLD-MCNC: 24 MG/DL (ref 7–17)
BUN/CREAT SERPL: 18.5 (ref 7–25)
CALCIUM SPEC-SCNC: 9.8 MG/DL (ref 8.4–10.2)
CHLORIDE SERPL-SCNC: 101 MMOL/L (ref 98–107)
CO2 SERPL-SCNC: 30 MMOL/L (ref 22–30)
CREAT BLD-MCNC: 1.3 MG/DL (ref 0.52–1.04)
GFR SERPL CREATININE-BSD FRML MDRD: 39 ML/MIN/1.73 (ref 39–90)
GLUCOSE BLD-MCNC: 232 MG/DL (ref 74–99)
POTASSIUM BLD-SCNC: 4.6 MMOL/L (ref 3.4–5)
SODIUM BLD-SCNC: 142 MMOL/L (ref 137–145)

## 2018-07-13 PROCEDURE — 99214 OFFICE O/P EST MOD 30 MIN: CPT | Performed by: FAMILY MEDICINE

## 2018-07-13 PROCEDURE — 80048 BASIC METABOLIC PNL TOTAL CA: CPT | Performed by: FAMILY MEDICINE

## 2018-07-13 PROCEDURE — 36415 COLL VENOUS BLD VENIPUNCTURE: CPT | Performed by: FAMILY MEDICINE

## 2018-07-13 RX ORDER — HYDROCODONE BITARTRATE AND ACETAMINOPHEN 7.5; 325 MG/1; MG/1
1 TABLET ORAL 2 TIMES DAILY PRN
Qty: 60 TABLET | Refills: 0 | Status: SHIPPED | OUTPATIENT
Start: 2018-07-13 | End: 2018-08-09 | Stop reason: SDUPTHER

## 2018-07-13 NOTE — PROGRESS NOTES
Subjective   Chief Complaint   Patient presents with   • Diabetes     4 weeks   • Fall     fell last night     Tatyana Yeager is a 83 y.o. female.   Diabetes (4 weeks) and Fall (fell last night)    History of Present Illness     [facility] - chronic back pain, insomnia, gerd, hypertension, hypothyroidism, diabetes    Presents for a recheck due to abnormal serum creatinine lab work  Her previous lab indicated a value of 1.7 on 6/6/18 and improved to 1.5 on 6/18/18  Her blood glucose average readings are 130  Denies polydipsia, polyphagia, and polyuria    Hypertension, PVD, VAMSI, stents placed, AS, MR, CABG in 2005 - managed with imdur, metoprolol, lisinopril, norvasc, hctz  Anticoagulated with plavix  Followed by Dr Alfa Calixto - currently managed with diet  Due to concerns about hypoglycemia she has held her glipizide  Average blood glucose readings are around 130 - 140  She recently picked up a new glucometer - embrace  She was discontinued from metformin due to elevated serum creatinine    Chronic back pain with degenerative disc disease - managed with norco PRN  No reported side effects with medication  Previously followed by Dr Buddy Bell for medication refill today    The following portions of the patient's history were reviewed and updated as appropriate: allergies, current medications, past family history, past medical history, past social history, past surgical history and problem list.    Review of Systems   Constitutional: Negative for appetite change, chills, fatigue and fever.   HENT: Negative for congestion, ear pain, rhinorrhea and sore throat.    Eyes: Negative for pain.   Respiratory: Negative for cough and shortness of breath.    Cardiovascular: Negative for chest pain and palpitations.   Gastrointestinal: Negative for abdominal pain, constipation, diarrhea and nausea.   Genitourinary: Negative for dysuria.   Musculoskeletal: Positive for arthralgias and back pain. Negative for joint swelling  "and neck pain.   Skin: Negative for rash.   Neurological: Negative for dizziness and headaches.       Objective   /72   Pulse 74   Temp 98.1 °F (36.7 °C)   Ht 157.5 cm (62.01\")   Wt 78.7 kg (173 lb 6.4 oz)   LMP 03/07/1971 (Within Months) Comment: Hysterectomy  SpO2 98%   BMI 31.71 kg/m²   Physical Exam   Constitutional: She is oriented to person, place, and time. She appears well-developed and well-nourished.   HENT:   Head: Normocephalic and atraumatic.   Eyes: Pupils are equal, round, and reactive to light.   Neck: Normal range of motion. Neck supple.   Cardiovascular: Normal rate, regular rhythm and normal heart sounds.    Pulmonary/Chest: Effort normal and breath sounds normal. No respiratory distress. She has no wheezes. She has no rales.   Abdominal: Soft. Bowel sounds are normal.   Musculoskeletal:        Lumbar back: She exhibits decreased range of motion and pain.   Neurological: She is alert and oriented to person, place, and time.   Skin: Skin is warm and dry.   Psychiatric: She has a normal mood and affect.   Nursing note and vitals reviewed.      Assessment/Plan   Problems Addressed this Visit        Cardiovascular and Mediastinum    Essential hypertension       Digestive    Class 1 obesity due to excess calories without serious comorbidity with body mass index (BMI) of 31.0 to 31.9 in adult       Endocrine    Diabetes mellitus without complication (CMS/HCC) - Primary       Nervous and Auditory    Chronic midline low back pain without sciatica       Musculoskeletal and Integument    DDD (degenerative disc disease), lumbar        Patient's Body mass index is 31.71 kg/m². BMI is above normal parameters. Recommendations include: exercise counseling and nutrition counseling.    The patient has read and signed the [facility] Controlled Substance Contract.  I will continue to see patient for regular follow up appointments.  They are well controlled on their medication.  DONAL is updated " every 3 months. The patient is aware of the potential for addiction and dependence.  Refilled norco    Lab work ordered    Continue with diabetic diet only - hold glipizide    Recheck in 4 weeks

## 2018-07-16 ENCOUNTER — TELEPHONE (OUTPATIENT)
Dept: FAMILY MEDICINE CLINIC | Facility: CLINIC | Age: 83
End: 2018-07-16

## 2018-07-16 NOTE — TELEPHONE ENCOUNTER
----- Message from Sri Mon MD sent at 7/13/2018  5:01 PM CDT -----  Creatinine has improved from 1.5 to 1.3

## 2018-08-09 ENCOUNTER — LAB (OUTPATIENT)
Dept: LAB | Facility: OTHER | Age: 83
End: 2018-08-09

## 2018-08-09 ENCOUNTER — OFFICE VISIT (OUTPATIENT)
Dept: FAMILY MEDICINE CLINIC | Facility: CLINIC | Age: 83
End: 2018-08-09

## 2018-08-09 ENCOUNTER — TELEPHONE (OUTPATIENT)
Dept: FAMILY MEDICINE CLINIC | Facility: CLINIC | Age: 83
End: 2018-08-09

## 2018-08-09 VITALS
WEIGHT: 172 LBS | DIASTOLIC BLOOD PRESSURE: 68 MMHG | HEIGHT: 62 IN | HEART RATE: 74 BPM | BODY MASS INDEX: 31.65 KG/M2 | OXYGEN SATURATION: 98 % | SYSTOLIC BLOOD PRESSURE: 128 MMHG

## 2018-08-09 DIAGNOSIS — E11.42 TYPE 2 DIABETES MELLITUS WITH DIABETIC POLYNEUROPATHY, WITHOUT LONG-TERM CURRENT USE OF INSULIN (HCC): Primary | ICD-10-CM

## 2018-08-09 DIAGNOSIS — E66.09 CLASS 1 OBESITY DUE TO EXCESS CALORIES WITHOUT SERIOUS COMORBIDITY WITH BODY MASS INDEX (BMI) OF 31.0 TO 31.9 IN ADULT: ICD-10-CM

## 2018-08-09 DIAGNOSIS — R79.89 ELEVATED SERUM CREATININE: ICD-10-CM

## 2018-08-09 DIAGNOSIS — I10 ESSENTIAL HYPERTENSION: ICD-10-CM

## 2018-08-09 LAB
ANION GAP SERPL CALCULATED.3IONS-SCNC: 10 MMOL/L (ref 5–15)
BUN BLD-MCNC: 34 MG/DL (ref 7–17)
BUN/CREAT SERPL: 23.6 (ref 7–25)
CALCIUM SPEC-SCNC: 9.9 MG/DL (ref 8.4–10.2)
CHLORIDE SERPL-SCNC: 103 MMOL/L (ref 98–107)
CO2 SERPL-SCNC: 29 MMOL/L (ref 22–30)
CREAT BLD-MCNC: 1.44 MG/DL (ref 0.52–1.04)
GFR SERPL CREATININE-BSD FRML MDRD: 35 ML/MIN/1.73 (ref 39–90)
GLUCOSE BLD-MCNC: 134 MG/DL (ref 74–99)
POTASSIUM BLD-SCNC: 4.8 MMOL/L (ref 3.4–5)
SODIUM BLD-SCNC: 142 MMOL/L (ref 137–145)

## 2018-08-09 PROCEDURE — 36415 COLL VENOUS BLD VENIPUNCTURE: CPT | Performed by: FAMILY MEDICINE

## 2018-08-09 PROCEDURE — 80048 BASIC METABOLIC PNL TOTAL CA: CPT | Performed by: FAMILY MEDICINE

## 2018-08-09 PROCEDURE — 99214 OFFICE O/P EST MOD 30 MIN: CPT | Performed by: FAMILY MEDICINE

## 2018-08-09 RX ORDER — HYDROCODONE BITARTRATE AND ACETAMINOPHEN 7.5; 325 MG/1; MG/1
1 TABLET ORAL 2 TIMES DAILY PRN
Qty: 60 TABLET | Refills: 0 | Status: SHIPPED | OUTPATIENT
Start: 2018-08-09 | End: 2018-09-06 | Stop reason: SDUPTHER

## 2018-08-09 RX ORDER — ALPRAZOLAM 1 MG/1
1 TABLET ORAL NIGHTLY PRN
Qty: 90 TABLET | Refills: 0 | Status: CANCELLED | OUTPATIENT
Start: 2018-08-09

## 2018-08-09 RX ORDER — GLIMEPIRIDE 2 MG/1
2 TABLET ORAL
Qty: 30 TABLET | Refills: 0 | Status: SHIPPED | OUTPATIENT
Start: 2018-08-09 | End: 2018-09-06 | Stop reason: SDUPTHER

## 2018-08-09 NOTE — PROGRESS NOTES
Subjective   Chief Complaint   Patient presents with   • Follow-up     4 wk on DM- wants labs done if time.    • Pain     Norco refill- last dose    • Foot Pain     Both feet- in toes. Burning and tingling      Tatyana Yeager is a 83 y.o. female.   Follow-up (4 wk on DM- wants labs done if time. ); Pain (Norco refill- last dose ); and Foot Pain (Both feet- in toes. Burning and tingling )    History of Present Illness     She presents today to follow up on her diabetes  Patient reports her blood glucose readings at home have increased up to 200  Glucometer readings range from 130-180   Denies polyphagia, polydipsia, and polyuria  Asking about repeating a Hgba1c  She was discontinued glipizide due to elevated creatinine and was recommended that she follow a diabetic diet for management  She admits to struggling with a restricted diet and consuming more fruit lately  Her blood work was last checked in July and had improved from 1.5 to 1.3    Complaints of bilateral foot pain in the evenings  Described as burning and pain in the toes  This just started several weeks ago    The following portions of the patient's history were reviewed and updated as appropriate: allergies, current medications, past family history, past medical history, past social history, past surgical history and problem list.    Review of Systems   Constitutional: Negative for appetite change, chills, fatigue and fever.   HENT: Negative for congestion, ear pain, rhinorrhea and sore throat.    Eyes: Negative for pain.   Respiratory: Negative for cough and shortness of breath.    Cardiovascular: Negative for chest pain and palpitations.   Gastrointestinal: Negative for abdominal pain, constipation, diarrhea and nausea.   Endocrine: Negative for polydipsia, polyphagia and polyuria.   Genitourinary: Negative for dysuria.   Musculoskeletal: Negative for back pain, joint swelling and neck pain.   Skin: Negative for rash.   Neurological: Positive for  "numbness. Negative for dizziness and headaches.       Objective   /68   Pulse 74   Ht 157.5 cm (62.01\")   Wt 78 kg (172 lb)   LMP 03/07/1971 (Within Months) Comment: Hysterectomy  SpO2 98%   BMI 31.45 kg/m²   Physical Exam   Constitutional: She is oriented to person, place, and time. She appears well-developed and well-nourished.   HENT:   Head: Normocephalic and atraumatic.   Eyes: Pupils are equal, round, and reactive to light.   Neck: Normal range of motion. Neck supple.   Cardiovascular: Normal rate, regular rhythm and normal heart sounds.    Pulmonary/Chest: Effort normal and breath sounds normal. No respiratory distress. She has no wheezes. She has no rales.   Abdominal: Soft. Bowel sounds are normal.   Musculoskeletal: Normal range of motion.   Neurological: She is alert and oriented to person, place, and time.   Skin: Skin is warm and dry.   Psychiatric: She has a normal mood and affect.   Nursing note and vitals reviewed.      Assessment/Plan   Problems Addressed this Visit        Cardiovascular and Mediastinum    Essential hypertension       Digestive    Class 1 obesity due to excess calories without serious comorbidity with body mass index (BMI) of 31.0 to 31.9 in adult       Endocrine    Type 2 diabetes mellitus with diabetic polyneuropathy (CMS/HCC) - Primary    Relevant Medications    glimepiride (AMARYL) 2 MG tablet      Other Visit Diagnoses     Elevated serum creatinine        Relevant Orders    Basic metabolic panel        Lab work ordered    Will repeat Hgba1c next month    Neuropathy most likely related to elevated blood glucose levels  Need to restart medical management  Start amaryl    Refilled norco  The patient has read and signed the [facility] Controlled Substance Contract.  I will continue to see patient for regular follow up appointments.  They are well controlled on their medication.  DONAL is updated every 3 months. The patient is aware of the potential for addiction " and madai haider reviewed [id number]    Recheck in 4 weeks

## 2018-08-09 NOTE — TELEPHONE ENCOUNTER
Informed Granddaughter of results, and instructions- cut hctz in half drink plenty of fluids- made her appt for 2 weeks and canceled the 4wk appt.

## 2018-08-09 NOTE — TELEPHONE ENCOUNTER
----- Message from Sri Mon MD sent at 8/9/2018  3:34 PM CDT -----  Kidney function worsened from 1.3 to 1.44.  She needs to cut her hctz in half and drink plenty of fluids.  Start new medication for her diabetes - amaryl - this is at [facility]'s pharmacy.  Needs to be seen in 2 weeks.

## 2018-08-24 ENCOUNTER — TELEPHONE (OUTPATIENT)
Dept: FAMILY MEDICINE CLINIC | Facility: CLINIC | Age: 83
End: 2018-08-24

## 2018-08-24 ENCOUNTER — OFFICE VISIT (OUTPATIENT)
Dept: FAMILY MEDICINE CLINIC | Facility: CLINIC | Age: 83
End: 2018-08-24

## 2018-08-24 ENCOUNTER — LAB (OUTPATIENT)
Dept: LAB | Facility: OTHER | Age: 83
End: 2018-08-24

## 2018-08-24 VITALS
OXYGEN SATURATION: 98 % | DIASTOLIC BLOOD PRESSURE: 68 MMHG | SYSTOLIC BLOOD PRESSURE: 138 MMHG | BODY MASS INDEX: 31.1 KG/M2 | HEART RATE: 73 BPM | WEIGHT: 169 LBS | HEIGHT: 62 IN | TEMPERATURE: 97.9 F

## 2018-08-24 DIAGNOSIS — I10 ESSENTIAL HYPERTENSION: ICD-10-CM

## 2018-08-24 DIAGNOSIS — R79.89 ELEVATED SERUM CREATININE: ICD-10-CM

## 2018-08-24 DIAGNOSIS — Z00.00 EXAMINATION: Primary | ICD-10-CM

## 2018-08-24 DIAGNOSIS — E11.42 TYPE 2 DIABETES MELLITUS WITH DIABETIC POLYNEUROPATHY, WITHOUT LONG-TERM CURRENT USE OF INSULIN (HCC): Primary | ICD-10-CM

## 2018-08-24 DIAGNOSIS — E87.5 HYPERKALEMIA: ICD-10-CM

## 2018-08-24 DIAGNOSIS — R30.0 DYSURIA: ICD-10-CM

## 2018-08-24 DIAGNOSIS — E66.09 CLASS 1 OBESITY DUE TO EXCESS CALORIES WITHOUT SERIOUS COMORBIDITY WITH BODY MASS INDEX (BMI) OF 30.0 TO 30.9 IN ADULT: ICD-10-CM

## 2018-08-24 LAB
ANION GAP SERPL CALCULATED.3IONS-SCNC: 10 MMOL/L (ref 5–15)
BILIRUB BLD-MCNC: NEGATIVE MG/DL
BUN BLD-MCNC: 27 MG/DL (ref 7–17)
BUN/CREAT SERPL: 16.9 (ref 7–25)
CALCIUM SPEC-SCNC: 10.4 MG/DL (ref 8.4–10.2)
CHLORIDE SERPL-SCNC: 103 MMOL/L (ref 98–107)
CLARITY, POC: CLEAR
CO2 SERPL-SCNC: 29 MMOL/L (ref 22–30)
COLOR UR: YELLOW
CREAT BLD-MCNC: 1.6 MG/DL (ref 0.52–1.04)
GFR SERPL CREATININE-BSD FRML MDRD: 31 ML/MIN/1.73 (ref 39–90)
GLUCOSE BLD-MCNC: 116 MG/DL (ref 74–99)
GLUCOSE UR STRIP-MCNC: NEGATIVE MG/DL
KETONES UR QL: NEGATIVE
LEUKOCYTE EST, POC: NEGATIVE
NITRITE UR-MCNC: NEGATIVE MG/ML
PH UR: 5 [PH] (ref 5–8)
POTASSIUM BLD-SCNC: 5.6 MMOL/L (ref 3.4–5)
PROT UR STRIP-MCNC: ABNORMAL MG/DL
RBC # UR STRIP: ABNORMAL /UL
SODIUM BLD-SCNC: 142 MMOL/L (ref 137–145)
SP GR UR: 1.02 (ref 1–1.03)
UROBILINOGEN UR QL: NORMAL

## 2018-08-24 PROCEDURE — 36415 COLL VENOUS BLD VENIPUNCTURE: CPT | Performed by: FAMILY MEDICINE

## 2018-08-24 PROCEDURE — 80048 BASIC METABOLIC PNL TOTAL CA: CPT | Performed by: FAMILY MEDICINE

## 2018-08-24 PROCEDURE — 99214 OFFICE O/P EST MOD 30 MIN: CPT | Performed by: FAMILY MEDICINE

## 2018-08-24 PROCEDURE — 81002 URINALYSIS NONAUTO W/O SCOPE: CPT | Performed by: FAMILY MEDICINE

## 2018-08-24 RX ORDER — HYDROCHLOROTHIAZIDE 12.5 MG/1
25 TABLET ORAL DAILY
Qty: 30 TABLET | Refills: 0
Start: 2018-08-24 | End: 2018-09-04 | Stop reason: HOSPADM

## 2018-08-24 NOTE — TELEPHONE ENCOUNTER
----- Message from Sri Mon MD sent at 8/24/2018  3:58 PM CDT -----  Creatinine is worse up to 1.6 from 1.4, potassium is high, GFR is lowered which indicates that she is not filtering as well - she needs to decrease her dose of amaryl to 1/2 tablet, continue with hctz 1/2 tablet, and recheck lab work in 1 week.  Make sure she is not taking potassium supplement or multivitamin and limit her intake of potassium rich foods (bananas, potatoes with skin, beans).  Suggest to see me in the office next Friday otherwise Dr Bustillos can see her next week while I am out of the office.  Also suggest referral to nephrology for consultation.  Order bmp.

## 2018-08-24 NOTE — PROGRESS NOTES
Subjective   Chief Complaint   Patient presents with   • Diabetes     2 weeks, brought monitor   • Urinary Tract Infection     Tatyana Yeager is a 83 y.o. female.   Diabetes (2 weeks, brought monitor) and Urinary Tract Infection    Urinary Tract Infection    This is a new problem. The current episode started 1 to 4 weeks ago. The problem occurs every urination. The problem has been gradually improving. The pain is at a severity of 0/10. The patient is experiencing no pain. There has been no fever. She is not sexually active. There is no history of pyelonephritis. Pertinent negatives include no chills, frequency, nausea or urgency. Treatments tried: azo. The treatment provided mild relief.      Diabetes - currently managed with amaryl  Denies polyphagia, polydipsia, and polyuria  She was discontinued glipizide due to elevated creatinine and was recommended that she follow a diabetic diet for management  She admits to struggling with a restricted diet and consuming more fruit lately  Her creatinine blood work was last checked and worsened from 1.3 to 1.44  Additionally her hydrochlorothiazide dose was decreased.  Blood pressure remains controlled with metoprolol, norvasc, hctz, lisinopril.    The following portions of the patient's history were reviewed and updated as appropriate: allergies, current medications, past family history, past medical history, past social history, past surgical history and problem list.    Review of Systems   Constitutional: Negative for appetite change, chills, fatigue and fever.   HENT: Negative for congestion, ear pain, rhinorrhea and sore throat.    Eyes: Negative for pain.   Respiratory: Negative for cough and shortness of breath.    Cardiovascular: Negative for chest pain and palpitations.   Gastrointestinal: Negative for abdominal pain, constipation, diarrhea and nausea.   Genitourinary: Positive for dysuria. Negative for frequency and urgency.   Musculoskeletal: Negative for back  "pain, joint swelling and neck pain.   Skin: Negative for rash.   Neurological: Negative for dizziness and headaches.       Objective   /68   Pulse 73   Temp 97.9 °F (36.6 °C)   Ht 157.5 cm (62.01\")   Wt 76.7 kg (169 lb)   LMP 03/07/1971 (Within Months) Comment: Hysterectomy  SpO2 98%   BMI 30.90 kg/m²   Physical Exam   Constitutional: She is oriented to person, place, and time. She appears well-developed and well-nourished.   HENT:   Head: Normocephalic and atraumatic.   Eyes: Pupils are equal, round, and reactive to light.   Neck: Normal range of motion. Neck supple.   Cardiovascular: Normal rate, regular rhythm and normal heart sounds.    Pulmonary/Chest: Effort normal and breath sounds normal. No respiratory distress. She has no wheezes. She has no rales.   Abdominal: Soft. Bowel sounds are normal.   Musculoskeletal: Normal range of motion.   Neurological: She is alert and oriented to person, place, and time.   Skin: Skin is warm and dry.   Psychiatric: She has a normal mood and affect.   Nursing note and vitals reviewed.      Assessment/Plan   Problems Addressed this Visit        Cardiovascular and Mediastinum    Essential hypertension    Relevant Medications    hydrochlorothiazide (HYDRODIURIL) 12.5 MG tablet    Other Relevant Orders    Basic metabolic panel (Completed)       Digestive    Class 1 obesity due to excess calories without serious comorbidity with body mass index (BMI) of 30.0 to 30.9 in adult       Endocrine    Type 2 diabetes mellitus with diabetic polyneuropathy (CMS/HCC) - Primary      Other Visit Diagnoses     Dysuria        Relevant Orders    POC Urinalysis Dipstick (Completed)    Elevated serum creatinine        Relevant Orders    Basic metabolic panel    Hyperkalemia        Relevant Orders    Basic metabolic panel        Lab ordered    Urinalysis ordered - negative    Patient's Body mass index is 30.9 kg/m². BMI is above normal parameters. Recommendations include: exercise " counseling and nutrition counseling.    Continue with half dose of hctz    Continue with amaryl    Recheck in 2 weeks    Lab results - hyperkalemia and serum creatinine worsened.  Discussed this with Dr Bustillos and he suggested to cut the dose of amaryl and continue with 1/2 dose of hctz.  Suggested a referral to nephrology for further evaluation for renal issues.    Patient's daughter was called with this information.  The patient is scheduled for a recheck with me on September 6th but she is aware that she can recheck with Dr Bustillos while I am out of the office.  Repeat bmp was ordered for next week.

## 2018-08-30 ENCOUNTER — HOSPITAL ENCOUNTER (INPATIENT)
Facility: HOSPITAL | Age: 83
LOS: 5 days | Discharge: HOME | End: 2018-09-04
Attending: FAMILY MEDICINE | Admitting: FAMILY MEDICINE

## 2018-08-30 ENCOUNTER — APPOINTMENT (OUTPATIENT)
Dept: ULTRASOUND IMAGING | Facility: HOSPITAL | Age: 83
End: 2018-08-30

## 2018-08-30 ENCOUNTER — APPOINTMENT (OUTPATIENT)
Dept: CT IMAGING | Facility: HOSPITAL | Age: 83
End: 2018-08-30

## 2018-08-30 ENCOUNTER — APPOINTMENT (OUTPATIENT)
Dept: CARDIOLOGY | Facility: HOSPITAL | Age: 83
End: 2018-08-30
Attending: INTERNAL MEDICINE

## 2018-08-30 ENCOUNTER — APPOINTMENT (OUTPATIENT)
Dept: GENERAL RADIOLOGY | Facility: HOSPITAL | Age: 83
End: 2018-08-30

## 2018-08-30 DIAGNOSIS — Z74.09 IMPAIRED PHYSICAL MOBILITY: ICD-10-CM

## 2018-08-30 PROBLEM — R50.9 FEVER: Status: ACTIVE | Noted: 2018-08-30

## 2018-08-30 LAB
ALBUMIN SERPL-MCNC: 3.4 G/DL (ref 3.4–4.8)
ALBUMIN/GLOB SERPL: 1.2 G/DL (ref 1.1–1.8)
ALP SERPL-CCNC: 59 U/L (ref 38–126)
ALT SERPL W P-5'-P-CCNC: 25 U/L (ref 9–52)
ANION GAP SERPL CALCULATED.3IONS-SCNC: 8 MMOL/L (ref 5–15)
AST SERPL-CCNC: 33 U/L (ref 14–36)
BH CV ECHO MEAS - ACS: 0.63 CM
BH CV ECHO MEAS - AO MAX PG (FULL): 57.1 MMHG
BH CV ECHO MEAS - AO MAX PG: 60.8 MMHG
BH CV ECHO MEAS - AO MEAN PG (FULL): 36.2 MMHG
BH CV ECHO MEAS - AO MEAN PG: 37.8 MMHG
BH CV ECHO MEAS - AO ROOT AREA (BSA CORRECTED): 1.7
BH CV ECHO MEAS - AO ROOT AREA: 6.8 CM^2
BH CV ECHO MEAS - AO ROOT DIAM: 2.9 CM
BH CV ECHO MEAS - AO V2 MAX: 390 CM/SEC
BH CV ECHO MEAS - AO V2 MEAN: 294.7 CM/SEC
BH CV ECHO MEAS - AO V2 VTI: 83.6 CM
BH CV ECHO MEAS - AVA(I,A): 0.63 CM^2
BH CV ECHO MEAS - AVA(I,D): 0.63 CM^2
BH CV ECHO MEAS - AVA(V,A): 0.77 CM^2
BH CV ECHO MEAS - AVA(V,D): 0.77 CM^2
BH CV ECHO MEAS - BSA(HAYCOCK): 1.9 M^2
BH CV ECHO MEAS - BSA: 1.8 M^2
BH CV ECHO MEAS - BZI_BMI: 31.2 KILOGRAMS/M^2
BH CV ECHO MEAS - BZI_METRIC_HEIGHT: 157 CM
BH CV ECHO MEAS - BZI_METRIC_WEIGHT: 77 KG
BH CV ECHO MEAS - EDV(CUBED): 74.4 ML
BH CV ECHO MEAS - EDV(TEICH): 78.8 ML
BH CV ECHO MEAS - EF(CUBED): 71.1 %
BH CV ECHO MEAS - EF(TEICH): 63.1 %
BH CV ECHO MEAS - ESV(CUBED): 21.5 ML
BH CV ECHO MEAS - ESV(TEICH): 29.1 ML
BH CV ECHO MEAS - FS: 33.9 %
BH CV ECHO MEAS - IVS/LVPW: 1.2
BH CV ECHO MEAS - IVSD: 1.2 CM
BH CV ECHO MEAS - LA DIMENSION: 5.2 CM
BH CV ECHO MEAS - LA/AO: 1.8
BH CV ECHO MEAS - LV MASS(C)D: 164.9 GRAMS
BH CV ECHO MEAS - LV MASS(C)DI: 92.7 GRAMS/M^2
BH CV ECHO MEAS - LV MAX PG: 3.7 MMHG
BH CV ECHO MEAS - LV MEAN PG: 1.7 MMHG
BH CV ECHO MEAS - LV V1 MAX: 96.5 CM/SEC
BH CV ECHO MEAS - LV V1 MEAN: 59.4 CM/SEC
BH CV ECHO MEAS - LV V1 VTI: 16.8 CM
BH CV ECHO MEAS - LVIDD: 4.2 CM
BH CV ECHO MEAS - LVIDS: 2.8 CM
BH CV ECHO MEAS - LVOT AREA: 3.1 CM^2
BH CV ECHO MEAS - LVOT DIAM: 2 CM
BH CV ECHO MEAS - LVPWD: 1 CM
BH CV ECHO MEAS - MR MAX PG: 175.9 MMHG
BH CV ECHO MEAS - MR MAX VEL: 663.2 CM/SEC
BH CV ECHO MEAS - MV A MAX VEL: 173 CM/SEC
BH CV ECHO MEAS - MV E MAX VEL: 188.1 CM/SEC
BH CV ECHO MEAS - MV E/A: 1.1
BH CV ECHO MEAS - MV MAX PG: 19.4 MMHG
BH CV ECHO MEAS - MV MEAN PG: 9 MMHG
BH CV ECHO MEAS - MV P1/2T MAX VEL: 216 CM/SEC
BH CV ECHO MEAS - MV V2 MAX: 220 CM/SEC
BH CV ECHO MEAS - MV V2 MEAN: 142.3 CM/SEC
BH CV ECHO MEAS - MV V2 VTI: 51.2 CM
BH CV ECHO MEAS - MVA P1/2T LCG: 1 CM^2
BH CV ECHO MEAS - MVA(VTI): 1 CM^2
BH CV ECHO MEAS - PA MAX PG: 3.6 MMHG
BH CV ECHO MEAS - PA V2 MAX: 94.2 CM/SEC
BH CV ECHO MEAS - RAP SYSTOLE: 10 MMHG
BH CV ECHO MEAS - RVDD: 3.5 CM
BH CV ECHO MEAS - RVSP: 38 MMHG
BH CV ECHO MEAS - SI(AO): 320.2 ML/M^2
BH CV ECHO MEAS - SI(CUBED): 29.7 ML/M^2
BH CV ECHO MEAS - SI(LVOT): 29.5 ML/M^2
BH CV ECHO MEAS - SI(TEICH): 28 ML/M^2
BH CV ECHO MEAS - SV(AO): 569.6 ML
BH CV ECHO MEAS - SV(CUBED): 52.9 ML
BH CV ECHO MEAS - SV(LVOT): 52.6 ML
BH CV ECHO MEAS - SV(TEICH): 49.8 ML
BILIRUB SERPL-MCNC: 0.3 MG/DL (ref 0.2–1.3)
BILIRUB UR QL STRIP: NEGATIVE
BUN BLD-MCNC: 26 MG/DL (ref 7–21)
BUN/CREAT SERPL: 19 (ref 7–25)
CA-I BLD-MCNC: 4.37 MG/DL (ref 4.6–5.6)
CALCIUM SPEC-SCNC: 8.2 MG/DL (ref 8.4–10.2)
CHLORIDE SERPL-SCNC: 107 MMOL/L (ref 95–110)
CLARITY UR: ABNORMAL
CO2 SERPL-SCNC: 25 MMOL/L (ref 22–31)
COLOR UR: ABNORMAL
CREAT BLD-MCNC: 1.37 MG/DL (ref 0.5–1)
CRP SERPL-MCNC: 4.7 MG/DL (ref 0–1)
D-LACTATE SERPL-SCNC: 0.9 MMOL/L (ref 0.5–2)
DEPRECATED RDW RBC AUTO: 39.9 FL (ref 36.4–46.3)
ERYTHROCYTE [DISTWIDTH] IN BLOOD BY AUTOMATED COUNT: 12.3 % (ref 11.5–14.5)
ERYTHROCYTE [SEDIMENTATION RATE] IN BLOOD: 76 MM/HR (ref 0–20)
GFR SERPL CREATININE-BSD FRML MDRD: 37 ML/MIN/1.73 (ref 39–90)
GLOBULIN UR ELPH-MCNC: 2.9 GM/DL (ref 2.3–3.5)
GLUCOSE BLD-MCNC: 158 MG/DL (ref 60–100)
GLUCOSE BLDC GLUCOMTR-MCNC: 137 MG/DL (ref 70–130)
GLUCOSE BLDC GLUCOMTR-MCNC: 188 MG/DL (ref 70–130)
GLUCOSE BLDC GLUCOMTR-MCNC: 62 MG/DL (ref 70–130)
GLUCOSE UR STRIP-MCNC: NEGATIVE MG/DL
HCT VFR BLD AUTO: 31.3 % (ref 35–45)
HGB BLD-MCNC: 10.8 G/DL (ref 12–15.5)
HGB UR QL STRIP.AUTO: NEGATIVE
HOLD SPECIMEN: NORMAL
KETONES UR QL STRIP: NEGATIVE
LEUKOCYTE ESTERASE UR QL STRIP.AUTO: NEGATIVE
MAXIMAL PREDICTED HEART RATE: 137 BPM
MCH RBC QN AUTO: 31 PG (ref 26.5–34)
MCHC RBC AUTO-ENTMCNC: 34.5 G/DL (ref 31.4–36)
MCV RBC AUTO: 89.9 FL (ref 80–98)
NITRITE UR QL STRIP: NEGATIVE
PH UR STRIP.AUTO: <=5 [PH] (ref 5–9)
PLATELET # BLD AUTO: 196 10*3/MM3 (ref 150–450)
PMV BLD AUTO: 10.4 FL (ref 8–12)
POTASSIUM BLD-SCNC: 4.1 MMOL/L (ref 3.5–5.1)
PROT SERPL-MCNC: 6.3 G/DL (ref 6.3–8.6)
PROT UR QL STRIP: NEGATIVE
RBC # BLD AUTO: 3.48 10*6/MM3 (ref 3.77–5.16)
SODIUM BLD-SCNC: 140 MMOL/L (ref 137–145)
SP GR UR STRIP: 1.02 (ref 1–1.03)
STRESS TARGET HR: 116 BPM
TROPONIN I SERPL-MCNC: 1.87 NG/ML
TROPONIN I SERPL-MCNC: 1.88 NG/ML
UROBILINOGEN UR QL STRIP: ABNORMAL
WBC NRBC COR # BLD: 13.35 10*3/MM3 (ref 3.2–9.8)

## 2018-08-30 PROCEDURE — 76705 ECHO EXAM OF ABDOMEN: CPT

## 2018-08-30 PROCEDURE — 82962 GLUCOSE BLOOD TEST: CPT

## 2018-08-30 PROCEDURE — 83605 ASSAY OF LACTIC ACID: CPT | Performed by: FAMILY MEDICINE

## 2018-08-30 PROCEDURE — 86140 C-REACTIVE PROTEIN: CPT | Performed by: FAMILY MEDICINE

## 2018-08-30 PROCEDURE — 94760 N-INVAS EAR/PLS OXIMETRY 1: CPT

## 2018-08-30 PROCEDURE — 25010000002 PIPERACILLIN SOD-TAZOBACTAM PER 1 G: Performed by: FAMILY MEDICINE

## 2018-08-30 PROCEDURE — 93010 ELECTROCARDIOGRAM REPORT: CPT | Performed by: INTERNAL MEDICINE

## 2018-08-30 PROCEDURE — 99222 1ST HOSP IP/OBS MODERATE 55: CPT | Performed by: INTERNAL MEDICINE

## 2018-08-30 PROCEDURE — 25010000002 ENOXAPARIN PER 10 MG: Performed by: FAMILY MEDICINE

## 2018-08-30 PROCEDURE — 87040 BLOOD CULTURE FOR BACTERIA: CPT | Performed by: FAMILY MEDICINE

## 2018-08-30 PROCEDURE — 85027 COMPLETE CBC AUTOMATED: CPT | Performed by: FAMILY MEDICINE

## 2018-08-30 PROCEDURE — 82330 ASSAY OF CALCIUM: CPT | Performed by: FAMILY MEDICINE

## 2018-08-30 PROCEDURE — 94799 UNLISTED PULMONARY SVC/PX: CPT

## 2018-08-30 PROCEDURE — 93306 TTE W/DOPPLER COMPLETE: CPT | Performed by: INTERNAL MEDICINE

## 2018-08-30 PROCEDURE — 93005 ELECTROCARDIOGRAM TRACING: CPT | Performed by: FAMILY MEDICINE

## 2018-08-30 PROCEDURE — 93306 TTE W/DOPPLER COMPLETE: CPT

## 2018-08-30 PROCEDURE — 84484 ASSAY OF TROPONIN QUANT: CPT | Performed by: FAMILY MEDICINE

## 2018-08-30 PROCEDURE — 85651 RBC SED RATE NONAUTOMATED: CPT | Performed by: FAMILY MEDICINE

## 2018-08-30 PROCEDURE — 80053 COMPREHEN METABOLIC PANEL: CPT | Performed by: FAMILY MEDICINE

## 2018-08-30 PROCEDURE — 71046 X-RAY EXAM CHEST 2 VIEWS: CPT

## 2018-08-30 PROCEDURE — 74176 CT ABD & PELVIS W/O CONTRAST: CPT

## 2018-08-30 PROCEDURE — 81003 URINALYSIS AUTO W/O SCOPE: CPT | Performed by: FAMILY MEDICINE

## 2018-08-30 RX ORDER — NITROGLYCERIN 0.4 MG/1
0.4 TABLET SUBLINGUAL
Status: DISCONTINUED | OUTPATIENT
Start: 2018-08-30 | End: 2018-09-04 | Stop reason: HOSPADM

## 2018-08-30 RX ORDER — ISOSORBIDE MONONITRATE 30 MG/1
30 TABLET, EXTENDED RELEASE ORAL
Status: DISCONTINUED | OUTPATIENT
Start: 2018-08-30 | End: 2018-09-04 | Stop reason: HOSPADM

## 2018-08-30 RX ORDER — AMLODIPINE BESYLATE 5 MG/1
5 TABLET ORAL DAILY
Status: DISCONTINUED | OUTPATIENT
Start: 2018-08-30 | End: 2018-08-30

## 2018-08-30 RX ORDER — NICOTINE POLACRILEX 4 MG
15 LOZENGE BUCCAL
Status: DISCONTINUED | OUTPATIENT
Start: 2018-08-30 | End: 2018-09-04 | Stop reason: HOSPADM

## 2018-08-30 RX ORDER — METOPROLOL SUCCINATE 50 MG/1
50 TABLET, EXTENDED RELEASE ORAL DAILY
Status: DISCONTINUED | OUTPATIENT
Start: 2018-08-30 | End: 2018-09-04 | Stop reason: HOSPADM

## 2018-08-30 RX ORDER — HYDROCODONE BITARTRATE AND ACETAMINOPHEN 7.5; 325 MG/1; MG/1
1 TABLET ORAL 2 TIMES DAILY PRN
Status: DISCONTINUED | OUTPATIENT
Start: 2018-08-30 | End: 2018-09-04 | Stop reason: HOSPADM

## 2018-08-30 RX ORDER — FAMOTIDINE 20 MG/1
20 TABLET, FILM COATED ORAL 2 TIMES DAILY PRN
Status: DISCONTINUED | OUTPATIENT
Start: 2018-08-30 | End: 2018-09-04 | Stop reason: HOSPADM

## 2018-08-30 RX ORDER — DEXTROSE MONOHYDRATE 25 G/50ML
25 INJECTION, SOLUTION INTRAVENOUS
Status: DISCONTINUED | OUTPATIENT
Start: 2018-08-30 | End: 2018-09-04 | Stop reason: HOSPADM

## 2018-08-30 RX ORDER — DOXEPIN HYDROCHLORIDE 50 MG/1
100 CAPSULE ORAL NIGHTLY
Status: DISCONTINUED | OUTPATIENT
Start: 2018-08-30 | End: 2018-09-04 | Stop reason: HOSPADM

## 2018-08-30 RX ORDER — LEVOTHYROXINE SODIUM 88 UG/1
88 TABLET ORAL DAILY
Status: DISCONTINUED | OUTPATIENT
Start: 2018-08-30 | End: 2018-09-04 | Stop reason: HOSPADM

## 2018-08-30 RX ORDER — CLOPIDOGREL BISULFATE 75 MG/1
75 TABLET ORAL DAILY
Status: DISCONTINUED | OUTPATIENT
Start: 2018-08-30 | End: 2018-09-04 | Stop reason: HOSPADM

## 2018-08-30 RX ORDER — ALPRAZOLAM 1 MG/1
1 TABLET ORAL NIGHTLY PRN
Status: DISCONTINUED | OUTPATIENT
Start: 2018-08-30 | End: 2018-09-04 | Stop reason: HOSPADM

## 2018-08-30 RX ORDER — SODIUM CHLORIDE 0.9 % (FLUSH) 0.9 %
1-10 SYRINGE (ML) INJECTION AS NEEDED
Status: DISCONTINUED | OUTPATIENT
Start: 2018-08-30 | End: 2018-09-04 | Stop reason: HOSPADM

## 2018-08-30 RX ORDER — ISOSORBIDE MONONITRATE 30 MG/1
30 TABLET, EXTENDED RELEASE ORAL DAILY
Status: DISCONTINUED | OUTPATIENT
Start: 2018-08-30 | End: 2018-08-30

## 2018-08-30 RX ORDER — ACETAMINOPHEN 325 MG/1
650 TABLET ORAL EVERY 6 HOURS PRN
Status: DISCONTINUED | OUTPATIENT
Start: 2018-08-30 | End: 2018-09-04 | Stop reason: HOSPADM

## 2018-08-30 RX ADMIN — ALPRAZOLAM 1 MG: 1 TABLET ORAL at 04:10

## 2018-08-30 RX ADMIN — ALPRAZOLAM 1 MG: 1 TABLET ORAL at 21:00

## 2018-08-30 RX ADMIN — PIPERACILLIN SODIUM,TAZOBACTAM SODIUM 3.38 G: 3; .375 INJECTION, POWDER, FOR SOLUTION INTRAVENOUS at 10:47

## 2018-08-30 RX ADMIN — ACETAMINOPHEN 650 MG: 325 TABLET ORAL at 10:43

## 2018-08-30 RX ADMIN — CLOPIDOGREL BISULFATE 75 MG: 75 TABLET ORAL at 10:43

## 2018-08-30 RX ADMIN — PIPERACILLIN SODIUM,TAZOBACTAM SODIUM 3.38 G: 3; .375 INJECTION, POWDER, FOR SOLUTION INTRAVENOUS at 05:14

## 2018-08-30 RX ADMIN — ENOXAPARIN SODIUM 80 MG: 80 INJECTION SUBCUTANEOUS at 05:13

## 2018-08-30 RX ADMIN — NITROGLYCERIN 0.4 MG: 0.4 TABLET, ORALLY DISINTEGRATING SUBLINGUAL at 04:10

## 2018-08-30 RX ADMIN — ACETAMINOPHEN 650 MG: 325 TABLET ORAL at 18:07

## 2018-08-30 RX ADMIN — METOPROLOL SUCCINATE 50 MG: 50 TABLET, EXTENDED RELEASE ORAL at 10:43

## 2018-08-30 RX ADMIN — ACETAMINOPHEN 650 MG: 325 TABLET ORAL at 05:13

## 2018-08-30 RX ADMIN — PIPERACILLIN SODIUM,TAZOBACTAM SODIUM 3.38 G: 3; .375 INJECTION, POWDER, FOR SOLUTION INTRAVENOUS at 18:01

## 2018-08-30 RX ADMIN — ISOSORBIDE MONONITRATE 30 MG: 30 TABLET, EXTENDED RELEASE ORAL at 10:43

## 2018-08-30 RX ADMIN — LEVOTHYROXINE SODIUM 88 MCG: 88 TABLET ORAL at 10:43

## 2018-08-30 RX ADMIN — DOXEPIN HYDROCHLORIDE 100 MG: 50 CAPSULE ORAL at 20:59

## 2018-08-31 LAB
ALBUMIN SERPL-MCNC: 3.4 G/DL (ref 3.4–4.8)
ALBUMIN/GLOB SERPL: 1.1 G/DL (ref 1.1–1.8)
ALP SERPL-CCNC: 62 U/L (ref 38–126)
ALT SERPL W P-5'-P-CCNC: 52 U/L (ref 9–52)
ANION GAP SERPL CALCULATED.3IONS-SCNC: 6 MMOL/L (ref 5–15)
AST SERPL-CCNC: 66 U/L (ref 14–36)
BASOPHILS # BLD AUTO: 0.02 10*3/MM3 (ref 0–0.2)
BASOPHILS NFR BLD AUTO: 0.2 % (ref 0–2)
BILIRUB SERPL-MCNC: 0.6 MG/DL (ref 0.2–1.3)
BUN BLD-MCNC: 21 MG/DL (ref 7–21)
BUN/CREAT SERPL: 16.5 (ref 7–25)
CALCIUM SPEC-SCNC: 8.4 MG/DL (ref 8.4–10.2)
CHLORIDE SERPL-SCNC: 105 MMOL/L (ref 95–110)
CO2 SERPL-SCNC: 27 MMOL/L (ref 22–31)
CREAT BLD-MCNC: 1.27 MG/DL (ref 0.5–1)
DEPRECATED RDW RBC AUTO: 41.5 FL (ref 36.4–46.3)
EOSINOPHIL # BLD AUTO: 0.17 10*3/MM3 (ref 0–0.7)
EOSINOPHIL NFR BLD AUTO: 1.8 % (ref 0–7)
ERYTHROCYTE [DISTWIDTH] IN BLOOD BY AUTOMATED COUNT: 12.6 % (ref 11.5–14.5)
GFR SERPL CREATININE-BSD FRML MDRD: 40 ML/MIN/1.73 (ref 39–90)
GLOBULIN UR ELPH-MCNC: 3.1 GM/DL (ref 2.3–3.5)
GLUCOSE BLD-MCNC: 73 MG/DL (ref 60–100)
GLUCOSE BLDC GLUCOMTR-MCNC: 124 MG/DL (ref 70–130)
GLUCOSE BLDC GLUCOMTR-MCNC: 149 MG/DL (ref 70–130)
GLUCOSE BLDC GLUCOMTR-MCNC: 177 MG/DL (ref 70–130)
GLUCOSE BLDC GLUCOMTR-MCNC: 86 MG/DL (ref 70–130)
GLUCOSE BLDC GLUCOMTR-MCNC: 92 MG/DL (ref 70–130)
HCT VFR BLD AUTO: 31.8 % (ref 35–45)
HGB BLD-MCNC: 10.7 G/DL (ref 12–15.5)
IMM GRANULOCYTES # BLD: 0.03 10*3/MM3 (ref 0–0.02)
IMM GRANULOCYTES NFR BLD: 0.3 % (ref 0–0.5)
LYMPHOCYTES # BLD AUTO: 0.69 10*3/MM3 (ref 0.6–4.2)
LYMPHOCYTES NFR BLD AUTO: 7.4 % (ref 10–50)
MCH RBC QN AUTO: 30.5 PG (ref 26.5–34)
MCHC RBC AUTO-ENTMCNC: 33.6 G/DL (ref 31.4–36)
MCV RBC AUTO: 90.6 FL (ref 80–98)
MONOCYTES # BLD AUTO: 0.5 10*3/MM3 (ref 0–0.9)
MONOCYTES NFR BLD AUTO: 5.3 % (ref 0–12)
NEUTROPHILS # BLD AUTO: 7.96 10*3/MM3 (ref 2–8.6)
NEUTROPHILS NFR BLD AUTO: 85 % (ref 37–80)
PLATELET # BLD AUTO: 168 10*3/MM3 (ref 150–450)
PMV BLD AUTO: 10.7 FL (ref 8–12)
POTASSIUM BLD-SCNC: 4.2 MMOL/L (ref 3.5–5.1)
PROT SERPL-MCNC: 6.5 G/DL (ref 6.3–8.6)
RBC # BLD AUTO: 3.51 10*6/MM3 (ref 3.77–5.16)
SODIUM BLD-SCNC: 138 MMOL/L (ref 137–145)
WBC NRBC COR # BLD: 9.37 10*3/MM3 (ref 3.2–9.8)

## 2018-08-31 PROCEDURE — 94799 UNLISTED PULMONARY SVC/PX: CPT

## 2018-08-31 PROCEDURE — 82962 GLUCOSE BLOOD TEST: CPT

## 2018-08-31 PROCEDURE — 80053 COMPREHEN METABOLIC PANEL: CPT | Performed by: FAMILY MEDICINE

## 2018-08-31 PROCEDURE — 93005 ELECTROCARDIOGRAM TRACING: CPT | Performed by: FAMILY MEDICINE

## 2018-08-31 PROCEDURE — 25010000002 PIPERACILLIN SOD-TAZOBACTAM PER 1 G: Performed by: FAMILY MEDICINE

## 2018-08-31 PROCEDURE — 93010 ELECTROCARDIOGRAM REPORT: CPT | Performed by: INTERNAL MEDICINE

## 2018-08-31 PROCEDURE — 25010000002 HEPARIN (PORCINE) PER 1000 UNITS: Performed by: FAMILY MEDICINE

## 2018-08-31 PROCEDURE — 94760 N-INVAS EAR/PLS OXIMETRY 1: CPT

## 2018-08-31 PROCEDURE — 85025 COMPLETE CBC W/AUTO DIFF WBC: CPT | Performed by: FAMILY MEDICINE

## 2018-08-31 RX ORDER — RANOLAZINE 500 MG/1
500 TABLET, EXTENDED RELEASE ORAL EVERY 12 HOURS SCHEDULED
Status: DISCONTINUED | OUTPATIENT
Start: 2018-08-31 | End: 2018-09-04 | Stop reason: HOSPADM

## 2018-08-31 RX ORDER — HEPARIN SODIUM 5000 [USP'U]/ML
5000 INJECTION, SOLUTION INTRAVENOUS; SUBCUTANEOUS EVERY 8 HOURS SCHEDULED
Status: DISCONTINUED | OUTPATIENT
Start: 2018-08-31 | End: 2018-09-04 | Stop reason: HOSPADM

## 2018-08-31 RX ADMIN — NITROGLYCERIN 0.4 MG: 0.4 TABLET, ORALLY DISINTEGRATING SUBLINGUAL at 19:30

## 2018-08-31 RX ADMIN — PIPERACILLIN SODIUM,TAZOBACTAM SODIUM 3.38 G: 3; .375 INJECTION, POWDER, FOR SOLUTION INTRAVENOUS at 03:35

## 2018-08-31 RX ADMIN — PIPERACILLIN SODIUM,TAZOBACTAM SODIUM 3.38 G: 3; .375 INJECTION, POWDER, FOR SOLUTION INTRAVENOUS at 11:24

## 2018-08-31 RX ADMIN — LEVOTHYROXINE SODIUM 88 MCG: 88 TABLET ORAL at 08:48

## 2018-08-31 RX ADMIN — METOPROLOL SUCCINATE 50 MG: 50 TABLET, EXTENDED RELEASE ORAL at 08:48

## 2018-08-31 RX ADMIN — CLOPIDOGREL BISULFATE 75 MG: 75 TABLET ORAL at 08:48

## 2018-08-31 RX ADMIN — HEPARIN SODIUM 5000 UNITS: 5000 INJECTION, SOLUTION INTRAVENOUS; SUBCUTANEOUS at 21:09

## 2018-08-31 RX ADMIN — PIPERACILLIN SODIUM,TAZOBACTAM SODIUM 3.38 G: 3; .375 INJECTION, POWDER, FOR SOLUTION INTRAVENOUS at 17:54

## 2018-08-31 RX ADMIN — NITROGLYCERIN 0.4 MG: 0.4 TABLET, ORALLY DISINTEGRATING SUBLINGUAL at 19:38

## 2018-08-31 RX ADMIN — RANOLAZINE 500 MG: 500 TABLET, FILM COATED, EXTENDED RELEASE ORAL at 21:09

## 2018-08-31 RX ADMIN — ISOSORBIDE MONONITRATE 30 MG: 30 TABLET, EXTENDED RELEASE ORAL at 08:48

## 2018-08-31 RX ADMIN — ACETAMINOPHEN 650 MG: 325 TABLET ORAL at 05:24

## 2018-08-31 RX ADMIN — DOXEPIN HYDROCHLORIDE 100 MG: 50 CAPSULE ORAL at 21:09

## 2018-08-31 RX ADMIN — ALPRAZOLAM 1 MG: 1 TABLET ORAL at 21:07

## 2018-08-31 RX ADMIN — HYDROCODONE BITARTRATE AND ACETAMINOPHEN 1 TABLET: 7.5; 325 TABLET ORAL at 18:51

## 2018-09-01 LAB
BASOPHILS # BLD AUTO: 0.02 10*3/MM3 (ref 0–0.2)
BASOPHILS NFR BLD AUTO: 0.2 % (ref 0–2)
DEPRECATED RDW RBC AUTO: 43.2 FL (ref 36.4–46.3)
EOSINOPHIL # BLD AUTO: 0.33 10*3/MM3 (ref 0–0.7)
EOSINOPHIL NFR BLD AUTO: 3.7 % (ref 0–7)
ERYTHROCYTE [DISTWIDTH] IN BLOOD BY AUTOMATED COUNT: 12.8 % (ref 11.5–14.5)
GLUCOSE BLDC GLUCOMTR-MCNC: 135 MG/DL (ref 70–130)
GLUCOSE BLDC GLUCOMTR-MCNC: 138 MG/DL (ref 70–130)
GLUCOSE BLDC GLUCOMTR-MCNC: 71 MG/DL (ref 70–130)
HCT VFR BLD AUTO: 34.7 % (ref 35–45)
HGB BLD-MCNC: 11.8 G/DL (ref 12–15.5)
IMM GRANULOCYTES # BLD: 0.05 10*3/MM3 (ref 0–0.02)
IMM GRANULOCYTES NFR BLD: 0.6 % (ref 0–0.5)
LYMPHOCYTES # BLD AUTO: 2.57 10*3/MM3 (ref 0.6–4.2)
LYMPHOCYTES NFR BLD AUTO: 28.8 % (ref 10–50)
MCH RBC QN AUTO: 31.1 PG (ref 26.5–34)
MCHC RBC AUTO-ENTMCNC: 34 G/DL (ref 31.4–36)
MCV RBC AUTO: 91.3 FL (ref 80–98)
MONOCYTES # BLD AUTO: 0.61 10*3/MM3 (ref 0–0.9)
MONOCYTES NFR BLD AUTO: 6.8 % (ref 0–12)
NEUTROPHILS # BLD AUTO: 5.35 10*3/MM3 (ref 2–8.6)
NEUTROPHILS NFR BLD AUTO: 59.9 % (ref 37–80)
NRBC BLD MANUAL-RTO: 0 /100 WBC (ref 0–0)
PLATELET # BLD AUTO: 188 10*3/MM3 (ref 150–450)
PMV BLD AUTO: 10.9 FL (ref 8–12)
RBC # BLD AUTO: 3.8 10*6/MM3 (ref 3.77–5.16)
WBC NRBC COR # BLD: 8.93 10*3/MM3 (ref 3.2–9.8)

## 2018-09-01 PROCEDURE — 25010000002 HEPARIN (PORCINE) PER 1000 UNITS: Performed by: FAMILY MEDICINE

## 2018-09-01 PROCEDURE — 85025 COMPLETE CBC W/AUTO DIFF WBC: CPT | Performed by: FAMILY MEDICINE

## 2018-09-01 PROCEDURE — 94799 UNLISTED PULMONARY SVC/PX: CPT

## 2018-09-01 PROCEDURE — 82962 GLUCOSE BLOOD TEST: CPT

## 2018-09-01 PROCEDURE — 94760 N-INVAS EAR/PLS OXIMETRY 1: CPT

## 2018-09-01 PROCEDURE — 25010000002 PIPERACILLIN SOD-TAZOBACTAM PER 1 G: Performed by: FAMILY MEDICINE

## 2018-09-01 RX ORDER — SODIUM CHLORIDE 9 MG/ML
INJECTION, SOLUTION INTRAVENOUS
Status: COMPLETED
Start: 2018-09-01 | End: 2018-09-01

## 2018-09-01 RX ADMIN — SODIUM CHLORIDE 1000 ML: 9 INJECTION, SOLUTION INTRAVENOUS at 11:09

## 2018-09-01 RX ADMIN — PIPERACILLIN SODIUM,TAZOBACTAM SODIUM 3.38 G: 3; .375 INJECTION, POWDER, FOR SOLUTION INTRAVENOUS at 17:55

## 2018-09-01 RX ADMIN — HEPARIN SODIUM 5000 UNITS: 5000 INJECTION, SOLUTION INTRAVENOUS; SUBCUTANEOUS at 05:09

## 2018-09-01 RX ADMIN — CLOPIDOGREL BISULFATE 75 MG: 75 TABLET ORAL at 09:37

## 2018-09-01 RX ADMIN — PIPERACILLIN SODIUM,TAZOBACTAM SODIUM 3.38 G: 3; .375 INJECTION, POWDER, FOR SOLUTION INTRAVENOUS at 11:09

## 2018-09-01 RX ADMIN — RANOLAZINE 500 MG: 500 TABLET, FILM COATED, EXTENDED RELEASE ORAL at 09:37

## 2018-09-01 RX ADMIN — DOXEPIN HYDROCHLORIDE 100 MG: 50 CAPSULE ORAL at 20:40

## 2018-09-01 RX ADMIN — RANOLAZINE 500 MG: 500 TABLET, FILM COATED, EXTENDED RELEASE ORAL at 21:10

## 2018-09-01 RX ADMIN — ISOSORBIDE MONONITRATE 30 MG: 30 TABLET, EXTENDED RELEASE ORAL at 09:37

## 2018-09-01 RX ADMIN — METOPROLOL SUCCINATE 50 MG: 50 TABLET, EXTENDED RELEASE ORAL at 09:37

## 2018-09-01 RX ADMIN — HEPARIN SODIUM 5000 UNITS: 5000 INJECTION, SOLUTION INTRAVENOUS; SUBCUTANEOUS at 14:31

## 2018-09-01 RX ADMIN — LEVOTHYROXINE SODIUM 88 MCG: 88 TABLET ORAL at 09:37

## 2018-09-01 RX ADMIN — HYDROCODONE BITARTRATE AND ACETAMINOPHEN 1 TABLET: 7.5; 325 TABLET ORAL at 11:20

## 2018-09-01 RX ADMIN — PIPERACILLIN SODIUM,TAZOBACTAM SODIUM 3.38 G: 3; .375 INJECTION, POWDER, FOR SOLUTION INTRAVENOUS at 02:08

## 2018-09-01 RX ADMIN — ALPRAZOLAM 1 MG: 1 TABLET ORAL at 20:40

## 2018-09-02 ENCOUNTER — APPOINTMENT (OUTPATIENT)
Dept: GENERAL RADIOLOGY | Facility: HOSPITAL | Age: 83
End: 2018-09-02

## 2018-09-02 LAB
BASOPHILS # BLD AUTO: 0.02 10*3/MM3 (ref 0–0.2)
BASOPHILS NFR BLD AUTO: 0.3 % (ref 0–2)
DEPRECATED RDW RBC AUTO: 42.8 FL (ref 36.4–46.3)
EOSINOPHIL # BLD AUTO: 0.16 10*3/MM3 (ref 0–0.7)
EOSINOPHIL NFR BLD AUTO: 2.4 % (ref 0–7)
ERYTHROCYTE [DISTWIDTH] IN BLOOD BY AUTOMATED COUNT: 12.7 % (ref 11.5–14.5)
GLUCOSE BLDC GLUCOMTR-MCNC: 105 MG/DL (ref 70–130)
GLUCOSE BLDC GLUCOMTR-MCNC: 136 MG/DL (ref 70–130)
GLUCOSE BLDC GLUCOMTR-MCNC: 136 MG/DL (ref 70–130)
GLUCOSE BLDC GLUCOMTR-MCNC: 232 MG/DL (ref 70–130)
GLUCOSE BLDC GLUCOMTR-MCNC: 90 MG/DL (ref 70–130)
HCT VFR BLD AUTO: 30.8 % (ref 35–45)
HGB BLD-MCNC: 10.3 G/DL (ref 12–15.5)
IMM GRANULOCYTES # BLD: 0.02 10*3/MM3 (ref 0–0.02)
IMM GRANULOCYTES NFR BLD: 0.3 % (ref 0–0.5)
LYMPHOCYTES # BLD AUTO: 2.06 10*3/MM3 (ref 0.6–4.2)
LYMPHOCYTES NFR BLD AUTO: 31.4 % (ref 10–50)
MCH RBC QN AUTO: 30.7 PG (ref 26.5–34)
MCHC RBC AUTO-ENTMCNC: 33.4 G/DL (ref 31.4–36)
MCV RBC AUTO: 91.7 FL (ref 80–98)
MONOCYTES # BLD AUTO: 0.46 10*3/MM3 (ref 0–0.9)
MONOCYTES NFR BLD AUTO: 7 % (ref 0–12)
NEUTROPHILS # BLD AUTO: 3.84 10*3/MM3 (ref 2–8.6)
NEUTROPHILS NFR BLD AUTO: 58.6 % (ref 37–80)
NRBC BLD MANUAL-RTO: 0 /100 WBC (ref 0–0)
PLATELET # BLD AUTO: 201 10*3/MM3 (ref 150–450)
PMV BLD AUTO: 10.5 FL (ref 8–12)
RBC # BLD AUTO: 3.36 10*6/MM3 (ref 3.77–5.16)
WBC NRBC COR # BLD: 6.56 10*3/MM3 (ref 3.2–9.8)

## 2018-09-02 PROCEDURE — 85025 COMPLETE CBC W/AUTO DIFF WBC: CPT | Performed by: FAMILY MEDICINE

## 2018-09-02 PROCEDURE — 73630 X-RAY EXAM OF FOOT: CPT

## 2018-09-02 PROCEDURE — 25010000002 HEPARIN (PORCINE) PER 1000 UNITS: Performed by: FAMILY MEDICINE

## 2018-09-02 PROCEDURE — 63710000001 INSULIN ASPART PER 5 UNITS: Performed by: FAMILY MEDICINE

## 2018-09-02 PROCEDURE — 25010000002 PIPERACILLIN SOD-TAZOBACTAM PER 1 G: Performed by: FAMILY MEDICINE

## 2018-09-02 PROCEDURE — 25010000002 PIPERACILLIN SOD-TAZOBACTAM PER 1 G: Performed by: INTERNAL MEDICINE

## 2018-09-02 PROCEDURE — 82962 GLUCOSE BLOOD TEST: CPT

## 2018-09-02 RX ADMIN — PIPERACILLIN SODIUM,TAZOBACTAM SODIUM 3.38 G: 3; .375 INJECTION, POWDER, FOR SOLUTION INTRAVENOUS at 18:06

## 2018-09-02 RX ADMIN — ISOSORBIDE MONONITRATE 30 MG: 30 TABLET, EXTENDED RELEASE ORAL at 09:35

## 2018-09-02 RX ADMIN — RANOLAZINE 500 MG: 500 TABLET, FILM COATED, EXTENDED RELEASE ORAL at 21:08

## 2018-09-02 RX ADMIN — METOPROLOL SUCCINATE 50 MG: 50 TABLET, EXTENDED RELEASE ORAL at 09:35

## 2018-09-02 RX ADMIN — HEPARIN SODIUM 5000 UNITS: 5000 INJECTION, SOLUTION INTRAVENOUS; SUBCUTANEOUS at 05:32

## 2018-09-02 RX ADMIN — CLOPIDOGREL BISULFATE 75 MG: 75 TABLET ORAL at 09:36

## 2018-09-02 RX ADMIN — HYDROCODONE BITARTRATE AND ACETAMINOPHEN 1 TABLET: 7.5; 325 TABLET ORAL at 03:22

## 2018-09-02 RX ADMIN — INSULIN ASPART 4 UNITS: 100 INJECTION, SOLUTION INTRAVENOUS; SUBCUTANEOUS at 21:07

## 2018-09-02 RX ADMIN — LEVOTHYROXINE SODIUM 88 MCG: 88 TABLET ORAL at 09:36

## 2018-09-02 RX ADMIN — PIPERACILLIN SODIUM,TAZOBACTAM SODIUM 3.38 G: 3; .375 INJECTION, POWDER, FOR SOLUTION INTRAVENOUS at 01:57

## 2018-09-02 RX ADMIN — RANOLAZINE 500 MG: 500 TABLET, FILM COATED, EXTENDED RELEASE ORAL at 09:35

## 2018-09-02 RX ADMIN — PIPERACILLIN SODIUM,TAZOBACTAM SODIUM 3.38 G: 3; .375 INJECTION, POWDER, FOR SOLUTION INTRAVENOUS at 09:47

## 2018-09-02 RX ADMIN — DOXEPIN HYDROCHLORIDE 100 MG: 50 CAPSULE ORAL at 20:19

## 2018-09-02 RX ADMIN — ALPRAZOLAM 1 MG: 1 TABLET ORAL at 20:19

## 2018-09-03 PROBLEM — J18.9 PNEUMONIA INVOLVING RIGHT LUNG: Status: ACTIVE | Noted: 2018-09-03

## 2018-09-03 LAB
BASOPHILS # BLD AUTO: 0.03 10*3/MM3 (ref 0–0.2)
BASOPHILS NFR BLD AUTO: 0.5 % (ref 0–2)
DEPRECATED RDW RBC AUTO: 39.8 FL (ref 36.4–46.3)
EOSINOPHIL # BLD AUTO: 0.2 10*3/MM3 (ref 0–0.7)
EOSINOPHIL NFR BLD AUTO: 3.1 % (ref 0–7)
ERYTHROCYTE [DISTWIDTH] IN BLOOD BY AUTOMATED COUNT: 12.2 % (ref 11.5–14.5)
GLUCOSE BLDC GLUCOMTR-MCNC: 108 MG/DL (ref 70–130)
GLUCOSE BLDC GLUCOMTR-MCNC: 162 MG/DL (ref 70–130)
HCT VFR BLD AUTO: 30.7 % (ref 35–45)
HGB BLD-MCNC: 10.5 G/DL (ref 12–15.5)
HOLD SPECIMEN: NORMAL
IMM GRANULOCYTES # BLD: 0.03 10*3/MM3 (ref 0–0.02)
IMM GRANULOCYTES NFR BLD: 0.5 % (ref 0–0.5)
LYMPHOCYTES # BLD AUTO: 2.01 10*3/MM3 (ref 0.6–4.2)
LYMPHOCYTES NFR BLD AUTO: 31.7 % (ref 10–50)
MCH RBC QN AUTO: 30.4 PG (ref 26.5–34)
MCHC RBC AUTO-ENTMCNC: 34.2 G/DL (ref 31.4–36)
MCV RBC AUTO: 89 FL (ref 80–98)
MONOCYTES # BLD AUTO: 0.53 10*3/MM3 (ref 0–0.9)
MONOCYTES NFR BLD AUTO: 8.3 % (ref 0–12)
NEUTROPHILS # BLD AUTO: 3.55 10*3/MM3 (ref 2–8.6)
NEUTROPHILS NFR BLD AUTO: 55.9 % (ref 37–80)
PLATELET # BLD AUTO: 188 10*3/MM3 (ref 150–450)
PMV BLD AUTO: 10.5 FL (ref 8–12)
RBC # BLD AUTO: 3.45 10*6/MM3 (ref 3.77–5.16)
WBC NRBC COR # BLD: 6.35 10*3/MM3 (ref 3.2–9.8)

## 2018-09-03 PROCEDURE — 97162 PT EVAL MOD COMPLEX 30 MIN: CPT | Performed by: PHYSICAL THERAPIST

## 2018-09-03 PROCEDURE — 85025 COMPLETE CBC W/AUTO DIFF WBC: CPT | Performed by: FAMILY MEDICINE

## 2018-09-03 PROCEDURE — 63710000001 INSULIN ASPART PER 5 UNITS: Performed by: FAMILY MEDICINE

## 2018-09-03 PROCEDURE — 97530 THERAPEUTIC ACTIVITIES: CPT | Performed by: PHYSICAL THERAPIST

## 2018-09-03 PROCEDURE — 25010000002 PIPERACILLIN SOD-TAZOBACTAM PER 1 G: Performed by: INTERNAL MEDICINE

## 2018-09-03 PROCEDURE — 25010000002 HEPARIN (PORCINE) PER 1000 UNITS: Performed by: FAMILY MEDICINE

## 2018-09-03 PROCEDURE — 82962 GLUCOSE BLOOD TEST: CPT

## 2018-09-03 PROCEDURE — G8978 MOBILITY CURRENT STATUS: HCPCS | Performed by: PHYSICAL THERAPIST

## 2018-09-03 PROCEDURE — G8979 MOBILITY GOAL STATUS: HCPCS | Performed by: PHYSICAL THERAPIST

## 2018-09-03 RX ADMIN — RANOLAZINE 500 MG: 500 TABLET, FILM COATED, EXTENDED RELEASE ORAL at 23:54

## 2018-09-03 RX ADMIN — HEPARIN SODIUM 5000 UNITS: 5000 INJECTION, SOLUTION INTRAVENOUS; SUBCUTANEOUS at 13:44

## 2018-09-03 RX ADMIN — ISOSORBIDE MONONITRATE 30 MG: 30 TABLET, EXTENDED RELEASE ORAL at 08:05

## 2018-09-03 RX ADMIN — RANOLAZINE 500 MG: 500 TABLET, FILM COATED, EXTENDED RELEASE ORAL at 08:05

## 2018-09-03 RX ADMIN — PIPERACILLIN SODIUM,TAZOBACTAM SODIUM 3.38 G: 3; .375 INJECTION, POWDER, FOR SOLUTION INTRAVENOUS at 02:20

## 2018-09-03 RX ADMIN — DOXEPIN HYDROCHLORIDE 100 MG: 50 CAPSULE ORAL at 20:59

## 2018-09-03 RX ADMIN — FAMOTIDINE 20 MG: 20 TABLET, FILM COATED ORAL at 18:25

## 2018-09-03 RX ADMIN — ALPRAZOLAM 1 MG: 1 TABLET ORAL at 20:59

## 2018-09-03 RX ADMIN — HEPARIN SODIUM 5000 UNITS: 5000 INJECTION, SOLUTION INTRAVENOUS; SUBCUTANEOUS at 05:09

## 2018-09-03 RX ADMIN — LEVOTHYROXINE SODIUM 88 MCG: 88 TABLET ORAL at 08:05

## 2018-09-03 RX ADMIN — PIPERACILLIN SODIUM,TAZOBACTAM SODIUM 3.38 G: 3; .375 INJECTION, POWDER, FOR SOLUTION INTRAVENOUS at 20:59

## 2018-09-03 RX ADMIN — METOPROLOL SUCCINATE 50 MG: 50 TABLET, EXTENDED RELEASE ORAL at 08:05

## 2018-09-03 RX ADMIN — CLOPIDOGREL BISULFATE 75 MG: 75 TABLET ORAL at 08:05

## 2018-09-03 RX ADMIN — INSULIN ASPART 2 UNITS: 100 INJECTION, SOLUTION INTRAVENOUS; SUBCUTANEOUS at 20:59

## 2018-09-03 RX ADMIN — INSULIN ASPART 2 UNITS: 100 INJECTION, SOLUTION INTRAVENOUS; SUBCUTANEOUS at 18:00

## 2018-09-03 RX ADMIN — Medication 10 ML: at 21:00

## 2018-09-03 RX ADMIN — HYDROCODONE BITARTRATE AND ACETAMINOPHEN 1 TABLET: 7.5; 325 TABLET ORAL at 08:10

## 2018-09-03 RX ADMIN — PIPERACILLIN SODIUM,TAZOBACTAM SODIUM 3.38 G: 3; .375 INJECTION, POWDER, FOR SOLUTION INTRAVENOUS at 13:37

## 2018-09-04 VITALS
HEART RATE: 95 BPM | TEMPERATURE: 98 F | DIASTOLIC BLOOD PRESSURE: 58 MMHG | SYSTOLIC BLOOD PRESSURE: 115 MMHG | RESPIRATION RATE: 18 BRPM | WEIGHT: 168 LBS | HEIGHT: 62 IN | BODY MASS INDEX: 30.91 KG/M2 | OXYGEN SATURATION: 97 %

## 2018-09-04 LAB
ALBUMIN SERPL-MCNC: 3.5 G/DL (ref 3.4–4.8)
ALBUMIN/GLOB SERPL: 1.1 G/DL (ref 1.1–1.8)
ALP SERPL-CCNC: 82 U/L (ref 38–126)
ALT SERPL W P-5'-P-CCNC: 37 U/L (ref 9–52)
ANION GAP SERPL CALCULATED.3IONS-SCNC: 6 MMOL/L (ref 5–15)
AST SERPL-CCNC: 40 U/L (ref 14–36)
BACTERIA SPEC AEROBE CULT: NORMAL
BACTERIA SPEC AEROBE CULT: NORMAL
BASOPHILS # BLD AUTO: 0.02 10*3/MM3 (ref 0–0.2)
BASOPHILS NFR BLD AUTO: 0.3 % (ref 0–2)
BILIRUB SERPL-MCNC: 0.4 MG/DL (ref 0.2–1.3)
BUN BLD-MCNC: 16 MG/DL (ref 7–21)
BUN/CREAT SERPL: 14.3 (ref 7–25)
CALCIUM SPEC-SCNC: 9.1 MG/DL (ref 8.4–10.2)
CHLORIDE SERPL-SCNC: 106 MMOL/L (ref 95–110)
CO2 SERPL-SCNC: 27 MMOL/L (ref 22–31)
CREAT BLD-MCNC: 1.12 MG/DL (ref 0.5–1)
DEPRECATED RDW RBC AUTO: 39.5 FL (ref 36.4–46.3)
EOSINOPHIL # BLD AUTO: 0.29 10*3/MM3 (ref 0–0.7)
EOSINOPHIL NFR BLD AUTO: 4.1 % (ref 0–7)
ERYTHROCYTE [DISTWIDTH] IN BLOOD BY AUTOMATED COUNT: 12.3 % (ref 11.5–14.5)
GFR SERPL CREATININE-BSD FRML MDRD: 46 ML/MIN/1.73 (ref 39–90)
GLOBULIN UR ELPH-MCNC: 3.3 GM/DL (ref 2.3–3.5)
GLUCOSE BLD-MCNC: 103 MG/DL (ref 60–100)
GLUCOSE BLDC GLUCOMTR-MCNC: 147 MG/DL (ref 70–130)
GLUCOSE BLDC GLUCOMTR-MCNC: 159 MG/DL (ref 70–130)
GLUCOSE BLDC GLUCOMTR-MCNC: 168 MG/DL (ref 70–130)
GLUCOSE BLDC GLUCOMTR-MCNC: 98 MG/DL (ref 70–130)
HCT VFR BLD AUTO: 30.8 % (ref 35–45)
HGB BLD-MCNC: 10.6 G/DL (ref 12–15.5)
IMM GRANULOCYTES # BLD: 0.05 10*3/MM3 (ref 0–0.02)
IMM GRANULOCYTES NFR BLD: 0.7 % (ref 0–0.5)
LYMPHOCYTES # BLD AUTO: 2.34 10*3/MM3 (ref 0.6–4.2)
LYMPHOCYTES NFR BLD AUTO: 33.1 % (ref 10–50)
MCH RBC QN AUTO: 30.3 PG (ref 26.5–34)
MCHC RBC AUTO-ENTMCNC: 34.4 G/DL (ref 31.4–36)
MCV RBC AUTO: 88 FL (ref 80–98)
MONOCYTES # BLD AUTO: 0.59 10*3/MM3 (ref 0–0.9)
MONOCYTES NFR BLD AUTO: 8.3 % (ref 0–12)
NEUTROPHILS # BLD AUTO: 3.79 10*3/MM3 (ref 2–8.6)
NEUTROPHILS NFR BLD AUTO: 53.5 % (ref 37–80)
PLATELET # BLD AUTO: 240 10*3/MM3 (ref 150–450)
PMV BLD AUTO: 10.4 FL (ref 8–12)
POTASSIUM BLD-SCNC: 3.8 MMOL/L (ref 3.5–5.1)
PROT SERPL-MCNC: 6.8 G/DL (ref 6.3–8.6)
RBC # BLD AUTO: 3.5 10*6/MM3 (ref 3.77–5.16)
SODIUM BLD-SCNC: 139 MMOL/L (ref 137–145)
WBC NRBC COR # BLD: 7.08 10*3/MM3 (ref 3.2–9.8)

## 2018-09-04 PROCEDURE — 63710000001 INSULIN ASPART PER 5 UNITS: Performed by: FAMILY MEDICINE

## 2018-09-04 PROCEDURE — 80053 COMPREHEN METABOLIC PANEL: CPT | Performed by: FAMILY MEDICINE

## 2018-09-04 PROCEDURE — 82962 GLUCOSE BLOOD TEST: CPT

## 2018-09-04 PROCEDURE — 85025 COMPLETE CBC W/AUTO DIFF WBC: CPT | Performed by: FAMILY MEDICINE

## 2018-09-04 PROCEDURE — 25010000002 PIPERACILLIN SOD-TAZOBACTAM PER 1 G: Performed by: INTERNAL MEDICINE

## 2018-09-04 PROCEDURE — 25010000002 HEPARIN (PORCINE) PER 1000 UNITS: Performed by: FAMILY MEDICINE

## 2018-09-04 RX ORDER — RANOLAZINE 500 MG/1
500 TABLET, EXTENDED RELEASE ORAL EVERY 12 HOURS SCHEDULED
Qty: 60 TABLET | Refills: 0 | Status: SHIPPED | OUTPATIENT
Start: 2018-09-04 | End: 2018-10-01 | Stop reason: SDUPTHER

## 2018-09-04 RX ADMIN — CLOPIDOGREL BISULFATE 75 MG: 75 TABLET ORAL at 08:48

## 2018-09-04 RX ADMIN — HEPARIN SODIUM 5000 UNITS: 5000 INJECTION, SOLUTION INTRAVENOUS; SUBCUTANEOUS at 05:13

## 2018-09-04 RX ADMIN — PIPERACILLIN SODIUM,TAZOBACTAM SODIUM 3.38 G: 3; .375 INJECTION, POWDER, FOR SOLUTION INTRAVENOUS at 02:26

## 2018-09-04 RX ADMIN — ISOSORBIDE MONONITRATE 30 MG: 30 TABLET, EXTENDED RELEASE ORAL at 08:48

## 2018-09-04 RX ADMIN — METOPROLOL SUCCINATE 50 MG: 50 TABLET, EXTENDED RELEASE ORAL at 08:49

## 2018-09-04 RX ADMIN — INSULIN ASPART 2 UNITS: 100 INJECTION, SOLUTION INTRAVENOUS; SUBCUTANEOUS at 13:16

## 2018-09-04 RX ADMIN — LEVOTHYROXINE SODIUM 88 MCG: 88 TABLET ORAL at 08:48

## 2018-09-04 RX ADMIN — HYDROCODONE BITARTRATE AND ACETAMINOPHEN 1 TABLET: 7.5; 325 TABLET ORAL at 03:44

## 2018-09-04 RX ADMIN — RANOLAZINE 500 MG: 500 TABLET, FILM COATED, EXTENDED RELEASE ORAL at 08:48

## 2018-09-05 ENCOUNTER — READMISSION MANAGEMENT (OUTPATIENT)
Dept: CALL CENTER | Facility: HOSPITAL | Age: 83
End: 2018-09-05

## 2018-09-05 NOTE — OUTREACH NOTE
Prep Survey      Responses   Facility patient discharged from?  [place]   Is patient eligible?  Yes   Discharge diagnosis  Pneumonia involving right lung    Does the patient have one of the following disease processes/diagnoses(primary or secondary)?  COPD/Pneumonia   Does the patient have Home health ordered?  No   Is there a DME ordered?  No   Prep survey completed?  Yes          Joyce Murdock RN

## 2018-09-06 ENCOUNTER — OFFICE VISIT (OUTPATIENT)
Dept: FAMILY MEDICINE CLINIC | Facility: CLINIC | Age: 83
End: 2018-09-06

## 2018-09-06 VITALS
SYSTOLIC BLOOD PRESSURE: 128 MMHG | OXYGEN SATURATION: 99 % | BODY MASS INDEX: 30.91 KG/M2 | HEART RATE: 83 BPM | HEIGHT: 62 IN | TEMPERATURE: 99 F | DIASTOLIC BLOOD PRESSURE: 66 MMHG | WEIGHT: 168 LBS

## 2018-09-06 DIAGNOSIS — M54.50 CHRONIC MIDLINE LOW BACK PAIN WITHOUT SCIATICA: ICD-10-CM

## 2018-09-06 DIAGNOSIS — G89.29 CHRONIC MIDLINE LOW BACK PAIN WITHOUT SCIATICA: ICD-10-CM

## 2018-09-06 DIAGNOSIS — I10 ESSENTIAL HYPERTENSION: ICD-10-CM

## 2018-09-06 DIAGNOSIS — E11.42 TYPE 2 DIABETES MELLITUS WITH DIABETIC POLYNEUROPATHY, WITHOUT LONG-TERM CURRENT USE OF INSULIN (HCC): Primary | ICD-10-CM

## 2018-09-06 DIAGNOSIS — E66.09 CLASS 1 OBESITY DUE TO EXCESS CALORIES WITHOUT SERIOUS COMORBIDITY WITH BODY MASS INDEX (BMI) OF 30.0 TO 30.9 IN ADULT: ICD-10-CM

## 2018-09-06 DIAGNOSIS — F41.1 GENERALIZED ANXIETY DISORDER: ICD-10-CM

## 2018-09-06 PROBLEM — R50.9 FEVER: Status: RESOLVED | Noted: 2018-08-30 | Resolved: 2018-09-06

## 2018-09-06 PROCEDURE — 99214 OFFICE O/P EST MOD 30 MIN: CPT | Performed by: FAMILY MEDICINE

## 2018-09-06 RX ORDER — GLIMEPIRIDE 1 MG/1
0.5 TABLET ORAL 2 TIMES DAILY
Start: 2018-09-06 | End: 2018-10-01 | Stop reason: SDUPTHER

## 2018-09-06 RX ORDER — ALPRAZOLAM 1 MG/1
1 TABLET ORAL NIGHTLY PRN
Qty: 90 TABLET | Refills: 0 | Status: SHIPPED | OUTPATIENT
Start: 2018-09-06 | End: 2018-11-29 | Stop reason: SDUPTHER

## 2018-09-06 RX ORDER — HYDROCODONE BITARTRATE AND ACETAMINOPHEN 7.5; 325 MG/1; MG/1
1 TABLET ORAL 2 TIMES DAILY PRN
Qty: 60 TABLET | Refills: 0 | Status: SHIPPED | OUTPATIENT
Start: 2018-09-06 | End: 2018-10-01 | Stop reason: SDUPTHER

## 2018-09-06 NOTE — PROGRESS NOTES
Subjective   Chief Complaint   Patient presents with   • Diabetes     2 weeks     Tatyana Yeager is a 83 y.o. female.   Diabetes (2 weeks)    History of Present Illness     Diabetes - currently managed with amaryl  She admits to cutting the 2mg dose in quarters - takes 1/4 pill BID  Denies polyphagia, polydipsia, and polyuria  She was discontinued glipizide due to elevated creatinine and was recommended that she follow a diabetic diet for management  She admits to struggling with a restricted diet and consuming more fruit lately  Her creatinine blood work was last checked and worsened from 1.3 to 1.44 which has improved to 1.12 from hospital lab work  She denies any issues with hyperglycemia - last blood glucose at home was 98 this morning  Additionally her hydrochlorothiazide was discontinued after a recent hospitalization  Blood pressure remains controlled with metoprolol, norvasc, lisinopril    She is scheduled to follow up with Dr Grimm  Having issues [employer] coverage  Chest pain frequency has been gradually improving    She is scheduled for a hospital follow up with me in 2 weeks    Due for a refill on xanax for anxiety/insomnia and norco for chronic arthritis pain    The following portions of the patient's history were reviewed and updated as appropriate: allergies, current medications, past family history, past medical history, past social history, past surgical history and problem list.    Review of Systems   Constitutional: Negative for appetite change, chills, fatigue and fever.   HENT: Negative for congestion, ear pain, rhinorrhea and sore throat.    Eyes: Negative for pain.   Respiratory: Negative for cough and shortness of breath.    Cardiovascular: Negative for chest pain and palpitations.   Gastrointestinal: Negative for abdominal pain, constipation, diarrhea and nausea.   Genitourinary: Negative for dysuria.   Musculoskeletal: Negative for back pain, joint swelling and neck pain.   Skin: Negative  "for rash.   Neurological: Negative for dizziness and headaches.       Objective   /66   Pulse 83   Temp 99 °F (37.2 °C)   Ht 157.5 cm (62.01\")   Wt 76.2 kg (168 lb)   LMP 03/07/1971 (Within Months) Comment: Hysterectomy  SpO2 99%   BMI 30.72 kg/m²   Physical Exam   Constitutional: She is oriented to person, place, and time. She appears well-developed and well-nourished.   HENT:   Head: Normocephalic and atraumatic.   Eyes: Pupils are equal, round, and reactive to light.   Neck: Normal range of motion. Neck supple.   Cardiovascular: Normal rate, regular rhythm and normal heart sounds.    Pulmonary/Chest: Effort normal and breath sounds normal. No respiratory distress. She has no wheezes. She has no rales.   Abdominal: Soft. Bowel sounds are normal.   Musculoskeletal: Normal range of motion.   Neurological: She is alert and oriented to person, place, and time.   Skin: Skin is warm and dry.   Psychiatric: She has a normal mood and affect.   Nursing note and vitals reviewed.      Assessment/Plan   Problems Addressed this Visit        Cardiovascular and Mediastinum    Essential hypertension       Digestive    Class 1 obesity due to excess calories without serious comorbidity with body mass index (BMI) of 30.0 to 30.9 in adult       Endocrine    Type 2 diabetes mellitus with diabetic polyneuropathy (CMS/[facility]) - Primary    Relevant Medications    glimepiride (AMARYL) 1 MG tablet       Nervous and Auditory    Chronic midline low back pain without sciatica    Relevant Medications    HYDROcodone-acetaminophen (NORCO) 7.5-325 MG per tablet       Other    Generalized anxiety disorder    Relevant Medications    ALPRAZolam (XANAX) 1 MG tablet        No change in medical management    Continue with amaryl    Blood pressure remains at goal    Monitor blood glucose daily    Refilled xanax, norco  The patient has read and signed the [facility] Controlled Substance Contract.  I will continue to see patient for regular " follow up appointments.  They are well controlled on their medication.  DONAL is updated every 3 months. The patient is aware of the potential for addiction and dependence.    Recheck for a hospital follow up as scheduled

## 2018-09-11 ENCOUNTER — READMISSION MANAGEMENT (OUTPATIENT)
Dept: CALL CENTER | Facility: HOSPITAL | Age: 83
End: 2018-09-11

## 2018-09-11 NOTE — OUTREACH NOTE
COPD/PN Week 1 Survey      Responses   Facility patient discharged from?  [facility]   Does the patient have one of the following disease processes/diagnoses(primary or secondary)?  COPD/Pneumonia   Is there a successful TCM telephone encounter documented?  No   Was the primary reason for admission:  Pneumonia   Week 1 attempt successful?  Yes   Call start time  1630   Call end time  1637   Discharge diagnosis  Pneumonia involving right lung    Is patient permission given to speak with other caregiver?  Yes   List who call center can speak with  Hoda or Tesha infante   Meds reviewed with patient/caregiver?  Yes   Is the patient having any side effects they believe may be caused by any medication additions or changes?  Yes   Side effects comments   Some dizziness possibly from Ranexa, wakes at night with night sweats, advised to call MD.   Does the patient have all medications ordered at discharge?  Yes   Is the patient taking all medications as directed (includes completed medication regime)?  Yes   Does the patient have a primary care provider?   Yes   Does the patient have an appointment with their PCP or pulmonologist within 7 days of discharge?  Yes   Has the patient kept scheduled appointments due by today?  Yes   Comments  Has seen Dr Mon, PCP since discharge.   Has home health visited the patient within 72 hours of discharge?  N/A   Psychosocial issues?  No   Did the patient receive a copy of their discharge instructions?  Yes   Nursing interventions  Reviewed instructions with patient   What is the patient's perception of their health status since discharge?  Same   Nursing Interventions  Advised patient to call provider, Nurse provided patient education   If the patient is a current smoker, are they able to teach back resources for cessation?  -- [Does not smoke]   Is the patient/caregiver able to teach back the hierarchy of who to call/visit for symptoms/problems? PCP, Specialist, Home health  nurse, Urgent Care, ED, 911  Yes   Is the patient able to teach back COPD zones?  Yes   Nursing interventions  Education provided on various zones   Patient reports what zone on this call?  Yellow Zone   Yellow Zone  Increased shortness of air, Unable to complete daily activities   Yellow interventions  Continue to use daily medications, Get plenty of rest, Call provider immediatly if symptoms do not improve   Is the patient/caregiver able to teach back signs and symptoms of worsening condition:  Fever/chills, Shortness of breath, Chest pain   Week 1 call completed?  Yes          Hayley Ross RN

## 2018-09-19 ENCOUNTER — READMISSION MANAGEMENT (OUTPATIENT)
Dept: CALL CENTER | Facility: HOSPITAL | Age: 83
End: 2018-09-19

## 2018-09-19 NOTE — OUTREACH NOTE
Total Joint Month 2 Survey      Responses   Facility patient discharged from?  [facility]   Does the patient have one of the following disease processes/diagnoses(primary or secondary)?  COPD/Pneumonia   Call start time  1427   Call end time  1433   Discharge diagnosis  Pneumonia involving right lung           Anitha Giles RN

## 2018-09-21 ENCOUNTER — OFFICE VISIT (OUTPATIENT)
Dept: CARDIOLOGY | Facility: CLINIC | Age: 83
End: 2018-09-21

## 2018-09-21 VITALS
SYSTOLIC BLOOD PRESSURE: 120 MMHG | BODY MASS INDEX: 30.73 KG/M2 | HEIGHT: 62 IN | DIASTOLIC BLOOD PRESSURE: 70 MMHG | HEART RATE: 80 BPM | WEIGHT: 167 LBS

## 2018-09-21 DIAGNOSIS — E78.2 MIXED HYPERLIPIDEMIA: ICD-10-CM

## 2018-09-21 DIAGNOSIS — I25.709 CORONARY ARTERY DISEASE INVOLVING CORONARY BYPASS GRAFT OF NATIVE HEART WITH ANGINA PECTORIS (HCC): Primary | ICD-10-CM

## 2018-09-21 DIAGNOSIS — E11.9 TYPE 2 DIABETES MELLITUS WITHOUT COMPLICATION, UNSPECIFIED LONG TERM INSULIN USE STATUS: ICD-10-CM

## 2018-09-21 DIAGNOSIS — I73.9 PERIPHERAL ARTERIAL DISEASE (HCC): ICD-10-CM

## 2018-09-21 DIAGNOSIS — I10 ESSENTIAL HYPERTENSION: ICD-10-CM

## 2018-09-21 PROCEDURE — 99214 OFFICE O/P EST MOD 30 MIN: CPT | Performed by: INTERNAL MEDICINE

## 2018-09-21 RX ORDER — DOCUSATE SODIUM 250 MG
250 CAPSULE ORAL DAILY
Qty: 30 CAPSULE | Refills: 6 | Status: SHIPPED | OUTPATIENT
Start: 2018-09-21 | End: 2021-01-01

## 2018-09-21 RX ORDER — NITROGLYCERIN 0.4 MG/1
0.4 TABLET SUBLINGUAL
Qty: 30 TABLET | Refills: 5 | Status: SHIPPED | OUTPATIENT
Start: 2018-09-21 | End: 2019-06-04 | Stop reason: SDUPTHER

## 2018-09-21 NOTE — PROGRESS NOTES
[facility]  OFFICE NOTE    Tatyana Yeager  84 y.o. female    09/21/2018  1. Coronary artery disease involving 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 (CMS/Formerly [facility])    2. Essential hypertension    3. Mixed hyperlipidemia    4. Peripheral arterial disease (CMS/Formerly [facility])    5. Type 2 diabetes mellitus without complication, unspecified long term insulin use status (CMS/Formerly [facility])        Chief complaint - chronic stable angina      History of present Illness- 83-year-old lady with chronic stable angina with the [place] class IIb's symptoms.  She also has history of shortness of breath associated with it and her sugars Are doing good and A1c is 6.2 .  She denies any change in symptoms she has chronic stable angina.   she uses nitroglycerin as needed, is on chronic isosorbide therapy.  She recently had dizziness with vertigo possibly middle ear infection and vertigo.  She is using a nitroglycerin every other day, I had given her Ranexa before but she could not afford it and she quit taking it.  She is on Calcijex block with amlodipine, isosorbide and lisinopril.  She is also complaining of some postprandial angina.  I discussed with her about stress test and also cardiac catheterization but she does not want to do any kind of testing at this point.  She will continue to want to try the medical therapy.  I asked her to come to the emergency room if the pain doesn't go away and stays and becomes unstable.          Allergies   Allergen Reactions   • Prilosec [Omeprazole] Other (See Comments)     Gives her cramps in legs    • Statins Other (See Comments)     Gives patient muscle and joint pain    • Flexeril [Cyclobenzaprine] Unknown (See Comments)     Leg spasms         Past Medical History:   Diagnosis Date   • Acquired hypothyroidism    • Aortic valve stenosis    • Artificial lens present    • Borderline glaucoma    • Chronic depression    • Chronic pain    • Corneal foreign body     OS    • Coronary arteriosclerosis    • Cortical senile cataract    • Diabetes mellitus (CMS/HCC)     NO RETINOPATHY   • Diverticular disease of colon    • Essential hypertension    • Generalized anxiety disorder    • GERD (gastroesophageal reflux disease)    • Histoplasmosis with retinitis    • History of bone density study 03/26/2009    DXA BONE DENSITY AXIAL 70393 (Normal for the hips and lumbar region. Hips represent 3.9% improvement.Lumbar represents 1.3 % improvement)   • History of echocardiogram 09/16/2015    Echocardiogram W/ color flow 63170 (Overall LV contractility normal with Ef of 55% with LVH and diastolic relaxation abnormality of the left ventricle.Moderate to severe AV stenosis.Mild mitral regurg.No intracardiac mass or thrombus.)   • History of mammogram 03/26/2009    benign findings    • Hyperlipidemia     not able to take statin     • Joint pain    • Low back pain    • Mammogram declined 2013   • Nonexudative age-related macular degeneration     MILD   • Nuclear cataract    • Peripheral vascular disease (CMS/HCC)    • Posterior subcapsular polar senile cataract    • Primary open angle glaucoma    • Transient ischemic attack involving carotid artery     Carotid territory transient ischemic attack     • Transient visual loss    • Type 2 diabetes mellitus (CMS/HCC)          Past Surgical History:   Procedure Laterality Date   • ANGIOPLASTY AORTIC  2013    Angioplasty, aorta (dilation) (stents of bilateral common iliacarteries)   2013 EMMANUEL STATON    • CATARACT EXTRACTION  12/19/2013    Remove cataract, insert lens (Left eye.)   • CATARACT EXTRACTION  09/20/2006    Remove cataract, insert lens (nuclear cataract, right)   • CATARACT EXTRACTION Bilateral    • COLONOSCOPY  04/25/2012    Colon endoscopy 02198 (Diverticulum in sigmoid colon. Internal & external hemorrhoids found.)   • COLONOSCOPY  05/12/2008    Colon endoscopy (Rectal bleeding. Ischemic colitis (watershed region) w.bleeding w/o infarction.  Internal hemorrhoids w/o bleeding)   • CORONARY ARTERY BYPASS GRAFT  01/12/2005    X2   • CRYOTHERAPY  12/12/2011    Destruction of Benign Lesion (1-14) 53076 (Actinic keratosis)    • ENDOSCOPY  04/25/2012    EGD w/ tube 93003 (Normal esophagus. Gastritis in stoamch. Biopsy taken. Normal duodenum. Biopsy taken   • ENDOSCOPY  05/12/2008    EGD with tube (Gastroesophageal reflux w/o esophagitis. 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 w/ rhomboid flap closure)   • EXCISION LESION      Remove nose lesion (1.5 mm punch biopsy,right tip of nose)   • HYSTERECTOMY      Partial hyst (for cervical cancer)   • INJECTION OF MEDICATION  10/04/2012    Kenalog (3)   • OTHER SURGICAL HISTORY  04/23/2013    EXTENDED VISUAL FIELDS STUDY 20091 (Glaucoma, primary open angle)    • OTHER SURGICAL HISTORY  09/16/2013    OCT DISC NFL 20282 (Glaucoma, borderline)   • SPINAL FUSION      Neck spinal fusion         Family History   Problem Relation Age of Onset   • Cirrhosis Mother         LIVER   • Throat cancer Father    • Coronary artery disease Other    • Diabetes Other    • Heart disease Other    • Breast cancer Neg Hx    • Colon cancer Neg Hx    • Endometrial cancer Neg Hx          Social History     Social History   • Marital status:      Spouse name: N/A   • Number of children: N/A   • Years of education: N/A     Occupational History   • Not on file.     Social History Main Topics   • Smoking status: Former Smoker     Packs/day: 1.00     Years: 14.00   • Smokeless tobacco: Never Used      Comment: QUIT AT AGE 35   • Alcohol use No   • Drug use: No   • Sexual activity: Defer     Other Topics Concern   • Not on file     Social History Narrative   • No narrative on file         Current Outpatient Prescriptions   Medication Sig Dispense Refill   •  ALPRAZolam (XANAX) 1 MG tablet Take 1 tablet by mouth At Night As Needed for Anxiety. 90 tablet 0   • amLODIPine (NORVASC) 5 MG tablet TAKE 1 TABLET EVERY DAY 90 tablet 1   • clopidogrel (PLAVIX) 75 MG tablet TAKE 1 TABLET EVERY DAY 90 tablet 1   • doxepin (SINEquan) 100 MG capsule TAKE 1 CAPSULE EVERY NIGHT 90 capsule 1   • ezetimibe (ZETIA) 10 MG tablet Take 1 tablet by mouth Daily. 30 tablet 0   • famotidine (PEPCID) 20 MG tablet Take 1 tablet by mouth 2 (Two) Times a Day As Needed for Heartburn. 180 tablet 1   • glimepiride (AMARYL) 1 MG tablet Take 0.5 tablets by mouth 2 (Two) Times a Day.     • glucose blood test strip To test blood glucose 1 - 2 times a day 100 each 3   • glucose monitor monitoring kit For diabetes mellitus 1 each 0   • HYDROcodone-acetaminophen (NORCO) 7.5-325 MG per tablet Take 1 tablet by mouth 2 (Two) Times a Day As Needed for Moderate Pain . 60 tablet 0   • isosorbide mononitrate (IMDUR) 30 MG 24 hr tablet TAKE 1 TABLET EVERY DAY 90 tablet 1   • Lancets misc To test blood glucose 1 -2 times a day 100 each 3   • levothyroxine (SYNTHROID, LEVOTHROID) 88 MCG tablet Take 1 tablet by mouth Daily. 90 tablet 1   • lisinopril (PRINIVIL,ZESTRIL) 2.5 MG tablet TAKE 1 TABLET EVERY DAY 90 tablet 1   • metoprolol succinate XL (TOPROL-XL) 50 MG 24 hr tablet TAKE 1 TABLET EVERY DAY 90 tablet 1   • nitroglycerin (NITROSTAT) 0.4 MG SL tablet Place 1 tablet under the tongue Every 5 (Five) Minutes As Needed for Chest Pain. Take no more than 3 doses in 15 minutes. 30 tablet 5   • Omega-3 Fatty Acids (FISH OIL) 1000 MG capsule capsule Take 2 capsules by mouth Daily With Breakfast. 270 capsule 1   • ranolazine (RANEXA) 500 MG 12 hr tablet Take 1 tablet by mouth Every 12 (Twelve) Hours. 60 tablet 0   • docusate sodium (COLACE) 250 MG capsule Take 1 capsule by mouth Daily. 30 capsule 6     No current facility-administered medications for this visit.          Review of Systems     Constitution: Denies any  "fatigue, fever or chills    HENT: Vertigo with dizziness    Eyes: Denies any blurring of vision, or photophobia     Cardivascular - As per history of present illness     Respiratory system-denies any COPD, shortness of breath with NYHA class II symptoms        Endocrine:   history of hyperlipidemia, diabetes,                             Musculoskeletal:   history of arthritis with musculoskeletal problems    Gastrointestinal: No nausea, vomiting, or melena    Genitourinary: No dysuria or hematuria    Neurological:   Has peripheral neuropathy    Psychiatric/Behavioral:        No history of depression,      Hematological- no history of easy bruising             OBJECTIVE    /70   Pulse 80   Ht 157.5 cm (62.01\")   Wt 75.8 kg (167 lb)   LMP 03/07/1971 (Within Months) Comment: Hysterectomy  BMI 30.54 kg/m²       Physical Exam     Constitutional: is oriented to person, place, and time.     Skin-warm and dry    Well developed and nourished in no acute distress      Head: Normocephalic and atraumatic.     Eyes: Pupils are equal, round    Neck: Neck supple. No bruit in the carotids,    Cardiovascular: [place] in the fifth intercostal space   Regular rate, and  Rhythm,    S1 greater than S2, no S3 or S4, no gallop     Pulmonary/Chest:   Air  Entry is equal on both sides  No wheezing or crackles,      Abdominal: Soft.  No hepatosplenomegaly, bowel sounds are present    Musculoskeletal: No kyphoscoliosis,     Neurological: is alert and oriented to person, place, and time.    cranial nerve are intact .   No motor or sensory deficit    Extremities-no edema, no radial femoral delay      Psychiatric: He has a normal mood and affect.                  His behavior is normal.           Procedures      A/P    CAD status post CABG with chronic stable angina-on maximum medical therapy possible, not on Ranexa due to high cost.  She uses nitroglycerin 0.4 mg every other day for chest pain and that is helping her.  I discussed the " option of stress test or coronary angiogram but she does not want to do anything at this point.   if it becomes unstable to come to the emergency room    Hypertension well controlled with amlodipine, HCTZ, isosorbide and Toprol-XL.    Diabetes on metformin and last A1c was good.      Cannot tolerate statins or any cholesterol medicines even though her cholesterol is high.     GERD on Pepcid .    she will follow with me in 6 months      Alfa Whalen MD  9/21/2018  12:04 PM    EMR Dragon/Transcription disclaimer:   Some of this note may be an electronic transcription/translation of spoken language to printed text. The electronic translation of spoken language may permit erroneous, or at times, nonsensical words or phrases to be inadvertently transcribed; Although I have reviewed the note for such errors, some may still exist.

## 2018-09-21 NOTE — PROGRESS NOTES
[facility] Cardiology  OFFICE NOTE    Tatyana Yeager  84 y.o. female    09/21/2018  1. Coronary artery disease involving 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 (CMS/[facility])    2. Essential hypertension    3. Mixed hyperlipidemia    4. Peripheral arterial disease (CMS/[facility])    5. Type 2 diabetes mellitus without complication, unspecified long term insulin use status (CMS/[facility])        Chief complaint -follow-up from the hospital    History of present Illness- 84 a lady who has history of coronary disease status post CABG with chronic stable angina was in the hospital with a recent respiratory failure and pneumonia and she was discharged home she did have a small non-Q-wave MI and she does have moderate aortic stenosis and moderate to severe mitral insufficiency and she is not willing for any kind of invasive therapy.  We'll continue medical therapy she is on nitrates, Ranexa which she takes only once a day as it makes her constipated start her on Colace for stool softener.  She has history of peripheral vascular disease had stent placement to the iliac artery the past.  She lost her daughter recently and since then she is little bit depressed and she is not willing for any kind of invasive therapy including cardiac catheterization              Allergies   Allergen Reactions   • Prilosec [Omeprazole] Other (See Comments)     Gives her cramps in legs    • Statins Other (See Comments)     Gives patient muscle and joint pain    • Flexeril [Cyclobenzaprine] Unknown (See Comments)     Leg spasms         Past Medical History:   Diagnosis Date   • Acquired hypothyroidism    • Aortic valve stenosis    • Artificial lens present    • Borderline glaucoma    • Chronic depression    • Chronic pain    • Corneal foreign body     OS   • Coronary arteriosclerosis    • Cortical senile cataract    • Diabetes mellitus (CMS/[facility])     NO RETINOPATHY   • Diverticular disease of colon    • Essential  hypertension    • Generalized anxiety disorder    • GERD (gastroesophageal reflux disease)    • Histoplasmosis with retinitis    • History of bone density study 03/26/2009    DXA BONE DENSITY AXIAL 05013 (Normal for the hips and lumbar region. Hips represent 3.9% improvement.Lumbar represents 1.3 % improvement)   • History of echocardiogram 09/16/2015    Echocardiogram W/ color flow 50457 (Overall LV contractility normal with Ef of 55% with LVH and diastolic relaxation abnormality of the left ventricle.Moderate to severe AV stenosis.Mild mitral regurg.No intracardiac mass or thrombus.)   • History of mammogram 03/26/2009    benign findings    • Hyperlipidemia     not able to take statin     • Joint pain    • Low back pain    • Mammogram declined 2013   • Nonexudative age-related macular degeneration     MILD   • Nuclear cataract    • Peripheral vascular disease (CMS/HCC)    • Posterior subcapsular polar senile cataract    • Primary open angle glaucoma    • Transient ischemic attack involving carotid artery     Carotid territory transient ischemic attack     • Transient visual loss    • Type 2 diabetes mellitus (CMS/HCC)          Past Surgical History:   Procedure Laterality Date   • ANGIOPLASTY AORTIC  2013    Angioplasty, aorta (dilation) (stents of bilateral common iliacarteries)   2013 EMMANUEL STATON    • CATARACT EXTRACTION  12/19/2013    Remove cataract, insert lens (Left eye.)   • CATARACT EXTRACTION  09/20/2006    Remove cataract, insert lens (nuclear cataract, right)   • CATARACT EXTRACTION Bilateral    • COLONOSCOPY  04/25/2012    Colon endoscopy 03753 (Diverticulum in sigmoid colon. Internal & external hemorrhoids found.)   • COLONOSCOPY  05/12/2008    Colon endoscopy (Rectal bleeding. Ischemic colitis (watershed region) w.bleeding w/o infarction. Internal hemorrhoids w/o bleeding)   • CORONARY ARTERY BYPASS GRAFT  01/12/2005    X2   • CRYOTHERAPY  12/12/2011    Destruction of Benign Lesion (1-14) 17849  (Actinic keratosis)    • ENDOSCOPY  04/25/2012    EGD w/ tube 02298 (Normal esophagus. Gastritis in stoamch. Biopsy taken. Normal duodenum. Biopsy taken   • ENDOSCOPY  05/12/2008    EGD with tube (Gastroesophageal reflux w/o esophagitis. 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 w/ rhomboid flap closure)   • EXCISION LESION      Remove nose lesion (1.5 mm punch biopsy,right tip of nose)   • HYSTERECTOMY      Partial hyst (for cervical cancer)   • INJECTION OF MEDICATION  10/04/2012    Kenalog (3)   • OTHER SURGICAL HISTORY  04/23/2013    EXTENDED VISUAL FIELDS STUDY 26561 (Glaucoma, primary open angle)    • OTHER SURGICAL HISTORY  09/16/2013    OCT DISC NFL 85921 (Glaucoma, borderline)   • SPINAL FUSION      Neck spinal fusion         Family History   Problem Relation Age of Onset   • Cirrhosis Mother         LIVER   • Throat cancer Father    • Coronary artery disease Other    • Diabetes Other    • Heart disease Other    • Breast cancer Neg Hx    • Colon cancer Neg Hx    • Endometrial cancer Neg Hx          Social History     Social History   • Marital status:      Spouse name: N/A   • Number of children: N/A   • Years of education: N/A     Occupational History   • Not on file.     Social History Main Topics   • Smoking status: Former Smoker     Packs/day: 1.00     Years: 14.00   • Smokeless tobacco: Never Used      Comment: QUIT AT AGE 35   • Alcohol use No   • Drug use: No   • Sexual activity: Defer     Other Topics Concern   • Not on file     Social History Narrative   • No narrative on file         Current Outpatient Prescriptions   Medication Sig Dispense Refill   • ALPRAZolam (XANAX) 1 MG tablet Take 1 tablet by mouth At Night As Needed for Anxiety. 90 tablet 0   • amLODIPine (NORVASC) 5 MG tablet TAKE 1 TABLET EVERY DAY 90  tablet 1   • clopidogrel (PLAVIX) 75 MG tablet TAKE 1 TABLET EVERY DAY 90 tablet 1   • doxepin (SINEquan) 100 MG capsule TAKE 1 CAPSULE EVERY NIGHT 90 capsule 1   • ezetimibe (ZETIA) 10 MG tablet Take 1 tablet by mouth Daily. 30 tablet 0   • famotidine (PEPCID) 20 MG tablet Take 1 tablet by mouth 2 (Two) Times a Day As Needed for Heartburn. 180 tablet 1   • glimepiride (AMARYL) 1 MG tablet Take 0.5 tablets by mouth 2 (Two) Times a Day.     • glucose blood test strip To test blood glucose 1 - 2 times a day 100 each 3   • glucose monitor monitoring kit For diabetes mellitus 1 each 0   • HYDROcodone-acetaminophen (NORCO) 7.5-325 MG per tablet Take 1 tablet by mouth 2 (Two) Times a Day As Needed for Moderate Pain . 60 tablet 0   • isosorbide mononitrate (IMDUR) 30 MG 24 hr tablet TAKE 1 TABLET EVERY DAY 90 tablet 1   • Lancets misc To test blood glucose 1 -2 times a day 100 each 3   • levothyroxine (SYNTHROID, LEVOTHROID) 88 MCG tablet Take 1 tablet by mouth Daily. 90 tablet 1   • lisinopril (PRINIVIL,ZESTRIL) 2.5 MG tablet TAKE 1 TABLET EVERY DAY 90 tablet 1   • metoprolol succinate XL (TOPROL-XL) 50 MG 24 hr tablet TAKE 1 TABLET EVERY DAY 90 tablet 1   • nitroglycerin (NITROSTAT) 0.4 MG SL tablet Place 1 tablet under the tongue Every 5 (Five) Minutes As Needed for Chest Pain. Take no more than 3 doses in 15 minutes. 30 tablet 5   • Omega-3 Fatty Acids (FISH OIL) 1000 MG capsule capsule Take 2 capsules by mouth Daily With Breakfast. 270 capsule 1   • ranolazine (RANEXA) 500 MG 12 hr tablet Take 1 tablet by mouth Every 12 (Twelve) Hours. 60 tablet 0   • docusate sodium (COLACE) 250 MG capsule Take 1 capsule by mouth Daily. 30 capsule 6     No current facility-administered medications for this visit.          Review of Systems     Constitution: Denies any fatigue, fever or chills    HENT: Denies any headache, hearing impairment,     Eyes: Denies any blurring of vision, or photophobia     Cardivascular - As per history of  "present illness     Respiratory system-  shortness of breath, Sleep apnea     Endocrine:   history of hyperlipidemia, diabetes,  or Hypothyroidism                       Musculoskeletal:   history of arthritis with musculoskeletal problems    Gastrointestinal: Constipation    Genitourinary: No dysuria or hematuria    Neurological:   History of TIA in the past    Psychiatric/Behavioral:        No history of depression    Hematological- no history of easy bruising or any bleeding diathesis            OBJECTIVE    /70   Pulse 80   Ht 157.5 cm (62.01\")   Wt 75.8 kg (167 lb)   LMP 03/07/1971 (Within Months) Comment: Hysterectomy  BMI 30.54 kg/m²       Physical Exam     Constitutional: is oriented to person, place, and time.     Skin-warm and dry    Well developed and nourished in no acute distress      Head: Normocephalic and atraumatic.     Eyes: Pupils are equal    Neck: Neck supple. No bruit in the carotids    Cardiovascular: [place] in the fifth intercostal space   Regular rate, and  Rhythm,    S1 greater than S2, 3/6 ejection systolic murmur    Pulmonary/Chest:   Air  Entry is equal on both sides  No wheezing or crackles,      Abdominal: Soft.  No hepatosplenomegaly, bowel sounds are present    Musculoskeletal: No kyphoscoliosis, no significant thickening of the joints    Neurological: is alert and oriented to person, place, and time.    cranial nerve are intact .   No motor or sensory deficit    Extremities-no edema, pulses are weak below the popliteal artery      Psychiatric: He has a normal mood and affect.                  His behavior is normal.           Procedures      Lab Results   Component Value Date    WBC 7.08 09/04/2018    HGB 10.6 (L) 09/04/2018    HCT 30.8 (L) 09/04/2018    MCV 88.0 09/04/2018     09/04/2018     Lab Results   Component Value Date    GLUCOSE 103 (H) 09/04/2018    BUN 16 09/04/2018    CREATININE 1.12 (H) 09/04/2018    EGFRIFNONA 46 09/04/2018    BCR 14.3 09/04/2018    CO2 " 27.0 09/04/2018    CALCIUM 9.1 09/04/2018    ALBUMIN 3.50 09/04/2018    AST 40 (H) 09/04/2018    ALT 37 09/04/2018     Lab Results   Component Value Date    CHOL 324 (H) 06/06/2018     Lab Results   Component Value Date    TRIG 168 (H) 06/06/2018    TRIG 397 (H) 09/17/2015    TRIG 432 (H) 03/18/2015     Lab Results   Component Value Date    HDL 49 06/06/2018    HDL 31 (L) 09/17/2015     No components found for: LDLCALC  Lab Results   Component Value Date     06/06/2018     (H) 09/17/2015     (H) 03/18/2015     No results found for: HDLLDLRATIO  No components found for: CHOLHDL  Lab Results   Component Value Date    HGBA1C 5.6 11/15/2017     Lab Results   Component Value Date    TSH 4.310 06/06/2018                  A/P  CAD status post CABG with chronic stable angina now with moderate aortic stenosis and moderate to severe mitral insufficiency on medical therapy.  She gets shortness of breath and chest pain easily.  She is not willing for any kind of invasive testing or procedures.  Is on antiplatelet therapy with Lasix and also on Ranexa and nitrates    Peripheral vascular disease prior stent placement.  Artery doing well.    Hypertension on amlodipine, Imdur, lisinopril, Toprol-XL    Constipation started her on Colace for stool softener.    Diabetes on glimepiride.    Deconditioning asked her to do exercise she cannot go to a rehabilitation place as it is too far and does not have anybody to take her and so she is going to walk in the house for about 10 minutes 2-3 times a day.    Follow-up in 2 months          This document has been electronically signed by Alfa Whalen MD on September 21, 2018 12:05 PM       EMR Dragon/Transcription disclaimer:   Some of this note may be an electronic transcription/translation of spoken language to printed text. The electronic translation of spoken language may permit erroneous, or at times, nonsensical words or phrases to be inadvertently  transcribed; Although I have reviewed the note for such errors, some may still exist.

## 2018-09-27 ENCOUNTER — READMISSION MANAGEMENT (OUTPATIENT)
Dept: CALL CENTER | Facility: HOSPITAL | Age: 83
End: 2018-09-27

## 2018-09-27 NOTE — OUTREACH NOTE
COPD/PN Week 3 Survey      Responses   Facility patient discharged from?  [facility]   Does the patient have one of the following disease processes/diagnoses(primary or secondary)?  COPD/Pneumonia   Was the primary reason for admission:  Pneumonia   Week 3 attempt successful?  No   Unsuccessful attempts  Attempt 1          Dianelys Small, RN

## 2018-09-28 ENCOUNTER — READMISSION MANAGEMENT (OUTPATIENT)
Dept: CALL CENTER | Facility: HOSPITAL | Age: 83
End: 2018-09-28

## 2018-09-28 NOTE — OUTREACH NOTE
COPD/PN Week 3 Survey      Responses   Facility patient discharged from?  [facility]   Does the patient have one of the following disease processes/diagnoses(primary or secondary)?  COPD/Pneumonia   Was the primary reason for admission:  Pneumonia   Week 3 attempt successful?  Yes   Call start time  1424   Call end time  1432   Discharge diagnosis  Pneumonia involving right lung    Meds reviewed with patient/caregiver?  Yes   Is the patient taking all medications as directed (includes completed medication regime)?  Yes   Does the patient have a primary care provider?   Yes   Has the patient kept scheduled appointments due by today?  Yes   Comments  Sees Dr Mon again on Monday 10/01.  Saw her heart mD   Has home health visited the patient within 72 hours of discharge?  N/A   Psychosocial issues?  No   What is the patient's perception of their health status since discharge?  Same   Nursing interventions  Advised patient to discuss with provider at next visit [Will probably get a pneumonia, allergic to flu shot]   Is the patient/caregiver able to teach back the hierarchy of who to call/visit for symptoms/problems? PCP, Specialist, Home health nurse, Urgent Care, ED, 911  Yes   Is the patient/caregiver able to teach back signs and symptoms of worsening condition:  Fever/chills, Shortness of breath, Chest pain   Week 3 call completed?  Yes          Hayley Ross RN

## 2018-10-01 ENCOUNTER — OFFICE VISIT (OUTPATIENT)
Dept: FAMILY MEDICINE CLINIC | Facility: CLINIC | Age: 83
End: 2018-10-01

## 2018-10-01 VITALS
BODY MASS INDEX: 30.55 KG/M2 | HEIGHT: 62 IN | SYSTOLIC BLOOD PRESSURE: 128 MMHG | HEART RATE: 79 BPM | DIASTOLIC BLOOD PRESSURE: 66 MMHG | WEIGHT: 166 LBS | OXYGEN SATURATION: 98 % | TEMPERATURE: 98.1 F

## 2018-10-01 DIAGNOSIS — Z23 NEED FOR PNEUMOCOCCAL VACCINATION: ICD-10-CM

## 2018-10-01 DIAGNOSIS — Z09 HOSPITAL DISCHARGE FOLLOW-UP: Primary | ICD-10-CM

## 2018-10-01 DIAGNOSIS — G89.29 CHRONIC MIDLINE LOW BACK PAIN WITHOUT SCIATICA: ICD-10-CM

## 2018-10-01 DIAGNOSIS — I34.0 NON-RHEUMATIC MITRAL REGURGITATION: ICD-10-CM

## 2018-10-01 DIAGNOSIS — I20.8 CHRONIC STABLE ANGINA (HCC): ICD-10-CM

## 2018-10-01 DIAGNOSIS — M54.50 CHRONIC MIDLINE LOW BACK PAIN WITHOUT SCIATICA: ICD-10-CM

## 2018-10-01 DIAGNOSIS — E11.42 TYPE 2 DIABETES MELLITUS WITH DIABETIC POLYNEUROPATHY, WITHOUT LONG-TERM CURRENT USE OF INSULIN (HCC): ICD-10-CM

## 2018-10-01 DIAGNOSIS — I35.0 NONRHEUMATIC AORTIC VALVE STENOSIS: ICD-10-CM

## 2018-10-01 PROCEDURE — 90670 PCV13 VACCINE IM: CPT | Performed by: FAMILY MEDICINE

## 2018-10-01 PROCEDURE — G0008 ADMIN INFLUENZA VIRUS VAC: HCPCS | Performed by: FAMILY MEDICINE

## 2018-10-01 PROCEDURE — 99214 OFFICE O/P EST MOD 30 MIN: CPT | Performed by: FAMILY MEDICINE

## 2018-10-01 RX ORDER — HYDROCODONE BITARTRATE AND ACETAMINOPHEN 7.5; 325 MG/1; MG/1
1 TABLET ORAL 2 TIMES DAILY PRN
Qty: 60 TABLET | Refills: 0 | Status: SHIPPED | OUTPATIENT
Start: 2018-10-01 | End: 2018-10-29 | Stop reason: SDUPTHER

## 2018-10-01 RX ORDER — GLIMEPIRIDE 1 MG/1
0.5 TABLET ORAL 2 TIMES DAILY
Qty: 90 TABLET | Refills: 0 | Status: SHIPPED | OUTPATIENT
Start: 2018-10-01 | End: 2018-12-11 | Stop reason: SDUPTHER

## 2018-10-01 RX ORDER — RANOLAZINE 500 MG/1
500 TABLET, EXTENDED RELEASE ORAL EVERY 12 HOURS SCHEDULED
Qty: 180 TABLET | Refills: 1 | Status: SHIPPED | OUTPATIENT
Start: 2018-10-01 | End: 2019-06-10 | Stop reason: SDUPTHER

## 2018-10-01 RX ORDER — HYDROCODONE BITARTRATE AND ACETAMINOPHEN 7.5; 325 MG/1; MG/1
1 TABLET ORAL 2 TIMES DAILY PRN
Qty: 60 TABLET | Refills: 0 | Status: SHIPPED | OUTPATIENT
Start: 2018-10-01 | End: 2018-10-01 | Stop reason: SDUPTHER

## 2018-10-01 RX ORDER — GLIMEPIRIDE 1 MG/1
0.5 TABLET ORAL 2 TIMES DAILY
Qty: 30 TABLET | Refills: 5
Start: 2018-10-01 | End: 2018-10-01 | Stop reason: SDUPTHER

## 2018-10-01 RX ORDER — RANOLAZINE 500 MG/1
500 TABLET, EXTENDED RELEASE ORAL EVERY 12 HOURS SCHEDULED
Qty: 60 TABLET | Refills: 5 | Status: SHIPPED | OUTPATIENT
Start: 2018-10-01 | End: 2018-10-01 | Stop reason: SDUPTHER

## 2018-10-01 NOTE — PROGRESS NOTES
Subjective   Chief Complaint   Patient presents with   • Hospital follow up     [place]   • wants pneumonia vac     Tatyana Yeager is a 84 y.o. female.   Hospital follow up ([place]) and wants pneumonia vac    History of Present Illness     Presents today for a hospital follow up from [facility] in [place]  She was first transferred to [place] on 8/29/18   And then she was transported to [place] early in the morning   She had complaints of fever and chills, chest pain  She underwent CT abdomen/pelvis to look for sources of sepsis due to elevated lactic acid level and elevated HR with fever of 102.5  Findings are consistent with right lower lobe pneumonia and cholelithaiss and mild diverticulosis    She was started on zosyn and completed a course of antibiotic  She was discharged home without the use of antibiotics   She was sent home 9/4/18    She was discontinued from hctz and flexeril    She was set up for a follow up with Dr Grimm 9/21/18  She was seen and he discussed interventions necessary  History of CABG 2006  History of Aortic Stenosis and Mitral Insufficiency    She did have a small non-Q-wave MI noted by ekg during hospitalization and she does have moderate aortic stenosis and moderate to severe mitral insufficiency and she is not willing for any kind of invasive therapy.      Due to ranexa causing constipation Dr Grimm started her on colace to help with her bowels.    Due to history of chronic stable angina - managed with nitrates, ranexa.    He suggested walking about 2-3 times per day in her home for daily exercise.    Recheck scheduled with cardiology November 16th 2018    Asking for a refill on ranexa, amaryl, and norco    Due for pneumococcal vaccine    The following portions of the patient's history were reviewed and updated as appropriate: allergies, current medications, past family history, past medical history, past social history, past surgical history and problem  "list.    Review of Systems   Constitutional: Positive for fatigue. Negative for appetite change, chills and fever.   HENT: Negative for congestion, ear pain, rhinorrhea and sore throat.    Eyes: Negative for pain.   Respiratory: Negative for cough and shortness of breath.    Cardiovascular: Negative for chest pain and palpitations.   Gastrointestinal: Negative for abdominal pain, constipation, diarrhea and nausea.   Genitourinary: Negative for dysuria.   Musculoskeletal: Negative for back pain, joint swelling and neck pain.   Skin: Negative for rash.   Neurological: Negative for dizziness and headaches.       Objective   /66   Pulse 79   Temp 98.1 °F (36.7 °C)   Ht 157.5 cm (62.01\")   Wt 75.3 kg (166 lb)   LMP 03/07/1971 (Within Months) Comment: Hysterectomy  SpO2 98%   BMI 30.35 kg/m²   Physical Exam   Constitutional: She is oriented to person, place, and time. She appears well-developed and well-nourished.   HENT:   Head: Normocephalic and atraumatic.   Eyes: Pupils are equal, round, and reactive to light.   Neck: Normal range of motion. Neck supple.   Cardiovascular: Normal rate, regular rhythm and normal heart sounds.    Pulmonary/Chest: Effort normal and breath sounds normal. No respiratory distress. She has no wheezes. She has no rales.   Abdominal: Soft. Bowel sounds are normal.   Musculoskeletal: Normal range of motion.   Neurological: She is alert and oriented to person, place, and time.   Skin: Skin is warm and dry.   Psychiatric: She has a normal mood and affect.   Nursing note and vitals reviewed.      Assessment/Plan   Problems Addressed this Visit        Cardiovascular and Mediastinum    Non-rheumatic mitral regurgitation    Relevant Medications    ranolazine (RANEXA) 500 MG 12 hr tablet    Nonrheumatic aortic valve stenosis    Relevant Medications    ranolazine (RANEXA) 500 MG 12 hr tablet    Chronic stable angina (CMS/HCC)    Relevant Medications    ranolazine (RANEXA) 500 MG 12 hr " tablet       Endocrine    Type 2 diabetes mellitus with diabetic polyneuropathy (CMS/HCC)    Relevant Medications    glimepiride (AMARYL) 1 MG tablet       Nervous and Auditory    Chronic midline low back pain without sciatica    Relevant Medications    HYDROcodone-acetaminophen (NORCO) 7.5-325 MG per tablet      Other Visit Diagnoses     Hospital discharge follow-up    -  Primary    Need for pneumococcal vaccination        Relevant Orders    Pneumococcal Conjugate Vaccine 13-Valent All (PCV13) (Completed)        The patient has read and signed the [facility] Controlled Substance Contract.  I will continue to see patient for regular follow up appointments.  They are well controlled on their medication.  DONAL is updated every 3 months. The patient is aware of the potential for addiction and dependence.  Refilled norco    Pneumococcal vaccine today    Declined flu vaccine    Declined shingles    Reviewed hospital records    Encouraged patient to exercise and have sunlight exposure    Recheck in 6 weeks

## 2018-10-08 ENCOUNTER — READMISSION MANAGEMENT (OUTPATIENT)
Dept: CALL CENTER | Facility: HOSPITAL | Age: 83
End: 2018-10-08

## 2018-10-08 NOTE — OUTREACH NOTE
COPD/PN Week 4 Survey      Responses   Facility patient discharged from?  [facility]   Does the patient have one of the following disease processes/diagnoses(primary or secondary)?  COPD/Pneumonia   Was the primary reason for admission:  Pneumonia   Week 4 attempt successful?  Yes   Call start time  1233   Call end time  1237   Discharge diagnosis  Pneumonia involving right lung    Is patient permission given to speak with other caregiver?  Yes   Meds reviewed with patient/caregiver?  Yes   Is the patient having any side effects they believe may be caused by any medication additions or changes?  No   Is the patient taking all medications as directed (includes completed medication regime)?  Yes   Medication comments  has added a laxative-Colace.   Has the patient kept scheduled appointments due by today?  Yes   Nursing Interventions  Advised patient to keep appointment   Is the patient still receiving Home Health Services?  N/A   Psychosocial issues?  No   What is the patient's perception of their health status since discharge?  Improving   Nursing Interventions  Nurse provided patient education   Are the patient's immunizations up to date?   Yes   Nursing interventions  Advised patient to discuss with provider at next visit   Is the patient/caregiver able to teach back the hierarchy of who to call/visit for symptoms/problems? PCP, Specialist, Home health nurse, Urgent Care, ED, 911  Yes   Additional teach back comments  She has had a PNA shot, she will hold off on HEP A and shingles.   Is the patient/caregiver able to teach back signs and symptoms of worsening condition:  Fever/chills, Shortness of breath, Chest pain   Is the patient/caregiver able to teach back importance of completing antibiotic course of treatment?  Yes   Week 4 call completed?  Yes   Would the patient like one additional call?  No   Graduated  Yes   Did the patient feel the follow up calls were helpful during their recovery period?  Yes   Was  the number of calls appropriate?  Yes          No Adan RN

## 2018-10-12 ENCOUNTER — OFFICE VISIT (OUTPATIENT)
Dept: FAMILY MEDICINE CLINIC | Facility: CLINIC | Age: 83
End: 2018-10-12

## 2018-10-12 VITALS
HEIGHT: 62 IN | OXYGEN SATURATION: 100 % | SYSTOLIC BLOOD PRESSURE: 122 MMHG | HEART RATE: 75 BPM | BODY MASS INDEX: 30.55 KG/M2 | DIASTOLIC BLOOD PRESSURE: 68 MMHG | WEIGHT: 166 LBS | TEMPERATURE: 98.4 F

## 2018-10-12 DIAGNOSIS — R50.9 FEVER, UNSPECIFIED FEVER CAUSE: Primary | ICD-10-CM

## 2018-10-12 DIAGNOSIS — H00.15 CHALAZION LEFT LOWER EYELID: ICD-10-CM

## 2018-10-12 PROCEDURE — 99213 OFFICE O/P EST LOW 20 MIN: CPT | Performed by: FAMILY MEDICINE

## 2018-10-12 NOTE — PATIENT INSTRUCTIONS
Chalazion  A chalazion is a swelling or lump on the eyelid. It can affect the upper or lower eyelid.  What are the causes?  This condition may be caused by:  · Long-lasting (chronic) inflammation of the eyelid glands.  · A blocked oil gland in the eyelid.    What are the signs or symptoms?  Symptoms of this condition include:  · A swelling on the eyelid. The swelling may spread to areas around the eye.  · A hard lump on the eyelid. This lump may make it hard to see out of the eye.    How is this diagnosed?  This condition is diagnosed with an examination of the eye.  How is this treated?  This condition is treated by applying a warm compress to the eyelid. If the condition does not improve after two days, it may be treated with:  · Surgery.  · Medicine that is injected into the chalazion by a health care provider.  · Medicine that is applied to the eye.    Follow these instructions at home:  · Do not touch the chalazion.  · Do not try to remove the pus, such as by squeezing the chalazion or sticking it with a pin or needle.  · Do not rub your eyes.  · Wash your hands often. Dry your hands with a clean towel.  · Keep your face, scalp, and eyebrows clean.  · Avoid wearing eye makeup.  · Apply a warm, moist compress to the eyelid 4-6 times a day for 10-15 minutes at a time. This will help to open any blocked glands and help to reduce redness and swelling.  · Apply over-the-counter and prescription medicines only as told by your health care provider.  · If the chalazion does not break open (rupture) on its own in a month, return to your health care provider.  · Keep all follow-up appointments as told by your health care provider. This is important.  Contact a health care provider if:  · Your eyelid has not improved in 4 weeks.  · Your eyelid is getting worse.  · You have a fever.  · The chalazion does not rupture on its own with home treatment in a month.  Get help right away if:  · You have pain in your eye.  · Your  vision changes.  · The chalazion becomes painful or red  · The chalazion gets bigger.  This information is not intended to replace advice given to you by your health care provider. Make sure you discuss any questions you have with your health care provider.  Document Released: 12/15/2001 Document Revised: 05/25/2017 Document Reviewed: 04/11/2016  [employer] Interactive Patient Education © 2018 [employer]

## 2018-10-12 NOTE — PROGRESS NOTES
"Subjective   Chief Complaint   Patient presents with   • Fever     Chills      Tatyana Yeager is a 84 y.o. female.   Fever (Chills )    History of Present Illness     Complaints of sudden onset of chills  Symptoms started last night  She is also having some intermittent back pain   Associated with fever, sweats, fatigue, chest pain, shortness of breath  Denies cough  She has taken tylenol, last dose at 5am    C/o lower left eyelid bump    The following portions of the patient's history were reviewed and updated as appropriate: allergies, current medications, past family history, past medical history, past social history, past surgical history and problem list.    Review of Systems   Constitutional: Positive for chills, fatigue and fever. Negative for appetite change.   HENT: Negative for congestion, ear pain, rhinorrhea and sore throat.    Eyes: Negative for pain.   Respiratory: Positive for shortness of breath. Negative for cough.    Cardiovascular: Negative for chest pain and palpitations.   Gastrointestinal: Negative for abdominal pain, constipation, diarrhea and nausea.   Genitourinary: Negative for dysuria.   Musculoskeletal: Negative for back pain, joint swelling and neck pain.   Skin: Negative for rash.   Neurological: Negative for dizziness and headaches.       Objective   /68   Pulse 75   Temp 98.4 °F (36.9 °C)   Ht 157.5 cm (62.01\")   Wt 75.3 kg (166 lb)   LMP 03/07/1971 (Within Months) Comment: Hysterectomy  SpO2 100%   BMI 30.35 kg/m²   Physical Exam   Constitutional: She is oriented to person, place, and time. She appears well-developed and well-nourished.   HENT:   Head: Normocephalic and atraumatic.   Eyes: Pupils are equal, round, and reactive to light.       Neck: Normal range of motion. Neck supple.   Cardiovascular: Normal rate and regular rhythm.    Murmur heard.  Pulmonary/Chest: Effort normal and breath sounds normal. No respiratory distress. She has no wheezes. She has no rales. "   Abdominal: Soft. Bowel sounds are normal.   Musculoskeletal: Normal range of motion.   Neurological: She is alert and oriented to person, place, and time.   Skin: Skin is warm and dry.   Psychiatric: She has a normal mood and affect.   Nursing note and vitals reviewed.      Assessment/Plan   Problems Addressed this Visit     None      Visit Diagnoses     Fever, unspecified fever cause    -  Primary    Relevant Orders    XR Chest 2 View (Completed)    Chalazion left lower eyelid            cxr ordered - reviewed with patient    Repeat blood pressure and HR readings done    Educational handout on chalazion    Recheck as needed

## 2018-10-29 DIAGNOSIS — G89.29 CHRONIC MIDLINE LOW BACK PAIN WITHOUT SCIATICA: ICD-10-CM

## 2018-10-29 DIAGNOSIS — M54.50 CHRONIC MIDLINE LOW BACK PAIN WITHOUT SCIATICA: ICD-10-CM

## 2018-10-30 DIAGNOSIS — G47.00 INSOMNIA, UNSPECIFIED TYPE: ICD-10-CM

## 2018-10-30 DIAGNOSIS — E03.9 ACQUIRED HYPOTHYROIDISM: ICD-10-CM

## 2018-10-30 DIAGNOSIS — I10 ESSENTIAL HYPERTENSION: ICD-10-CM

## 2018-10-30 DIAGNOSIS — K21.9 GASTROESOPHAGEAL REFLUX DISEASE WITHOUT ESOPHAGITIS: ICD-10-CM

## 2018-10-31 RX ORDER — AMLODIPINE BESYLATE 5 MG/1
TABLET ORAL
Qty: 90 TABLET | Refills: 1 | Status: SHIPPED | OUTPATIENT
Start: 2018-10-31 | End: 2019-02-21 | Stop reason: SDUPTHER

## 2018-10-31 RX ORDER — LEVOTHYROXINE SODIUM 88 UG/1
TABLET ORAL
Qty: 90 TABLET | Refills: 1 | Status: SHIPPED | OUTPATIENT
Start: 2018-10-31 | End: 2019-02-21 | Stop reason: SDUPTHER

## 2018-10-31 RX ORDER — CLOPIDOGREL BISULFATE 75 MG/1
TABLET ORAL
Qty: 90 TABLET | Refills: 1 | Status: SHIPPED | OUTPATIENT
Start: 2018-10-31 | End: 2019-02-21 | Stop reason: SDUPTHER

## 2018-10-31 RX ORDER — HYDROCODONE BITARTRATE AND ACETAMINOPHEN 7.5; 325 MG/1; MG/1
1 TABLET ORAL 2 TIMES DAILY PRN
Qty: 60 TABLET | Refills: 0 | Status: SHIPPED | OUTPATIENT
Start: 2018-10-31 | End: 2018-11-29 | Stop reason: SDUPTHER

## 2018-10-31 RX ORDER — FAMOTIDINE 20 MG/1
20 TABLET, FILM COATED ORAL 2 TIMES DAILY PRN
Qty: 180 TABLET | Refills: 1 | Status: SHIPPED | OUTPATIENT
Start: 2018-10-31 | End: 2019-02-21 | Stop reason: SDUPTHER

## 2018-10-31 RX ORDER — DOXEPIN HYDROCHLORIDE 100 MG/1
CAPSULE ORAL
Qty: 90 CAPSULE | Refills: 1 | Status: SHIPPED | OUTPATIENT
Start: 2018-10-31 | End: 2019-02-21 | Stop reason: SDUPTHER

## 2018-11-12 ENCOUNTER — OFFICE VISIT (OUTPATIENT)
Dept: FAMILY MEDICINE CLINIC | Facility: CLINIC | Age: 83
End: 2018-11-12

## 2018-11-12 VITALS
TEMPERATURE: 99 F | HEART RATE: 78 BPM | SYSTOLIC BLOOD PRESSURE: 128 MMHG | DIASTOLIC BLOOD PRESSURE: 68 MMHG | OXYGEN SATURATION: 98 % | HEIGHT: 63 IN | BODY MASS INDEX: 29.13 KG/M2 | WEIGHT: 164.4 LBS

## 2018-11-12 DIAGNOSIS — J01.00 ACUTE NON-RECURRENT MAXILLARY SINUSITIS: Primary | ICD-10-CM

## 2018-11-12 PROCEDURE — 99213 OFFICE O/P EST LOW 20 MIN: CPT | Performed by: FAMILY MEDICINE

## 2018-11-12 PROCEDURE — 96372 THER/PROPH/DIAG INJ SC/IM: CPT | Performed by: FAMILY MEDICINE

## 2018-11-12 RX ORDER — TRIAMCINOLONE ACETONIDE 40 MG/ML
40 INJECTION, SUSPENSION INTRA-ARTICULAR; INTRAMUSCULAR ONCE
Status: COMPLETED | OUTPATIENT
Start: 2018-11-12 | End: 2018-11-12

## 2018-11-12 RX ORDER — CEFTRIAXONE 1 G/1
1 INJECTION, POWDER, FOR SOLUTION INTRAMUSCULAR; INTRAVENOUS ONCE
Status: COMPLETED | OUTPATIENT
Start: 2018-11-12 | End: 2018-11-12

## 2018-11-12 RX ADMIN — TRIAMCINOLONE ACETONIDE 40 MG: 40 INJECTION, SUSPENSION INTRA-ARTICULAR; INTRAMUSCULAR at 10:21

## 2018-11-12 RX ADMIN — CEFTRIAXONE 1 G: 1 INJECTION, POWDER, FOR SOLUTION INTRAMUSCULAR; INTRAVENOUS at 10:20

## 2018-11-12 NOTE — PROGRESS NOTES
"Subjective   Chief Complaint   Patient presents with   • Sore Throat   • Cough     non productive     Tatyana Yeager is a 84 y.o. female.   Sore Throat and Cough (non productive)    History of Present Illness     Presents today with complaints of sore throat, nonproductive cough, postnasal drainage  She was first seen in urgent care 10/30/18  She was started on claritin, flonase, cefazil  Completed course of antibiotics  Diagnosed with sinusitis - maxillary  This morning when she blew her nose - she had green drainage    The following portions of the patient's history were reviewed and updated as appropriate: allergies, current medications, past family history, past medical history, past social history, past surgical history and problem list.    Review of Systems   Constitutional: Negative for appetite change, chills, fatigue and fever.   HENT: Positive for congestion, postnasal drip, sinus pressure, sinus pain and sore throat. Negative for ear pain and rhinorrhea.    Eyes: Negative for pain.   Respiratory: Positive for cough. Negative for shortness of breath.    Cardiovascular: Negative for chest pain and palpitations.   Gastrointestinal: Negative for abdominal pain, constipation, diarrhea and nausea.   Genitourinary: Negative for dysuria.   Musculoskeletal: Negative for back pain, joint swelling and neck pain.   Skin: Negative for rash.   Neurological: Positive for headaches. Negative for dizziness.       Objective   /68   Pulse 78   Temp 99 °F (37.2 °C)   Ht 158.8 cm (62.52\")   Wt 74.6 kg (164 lb 6.4 oz)   LMP 03/07/1971 (Within Months) Comment: Hysterectomy  SpO2 98%   BMI 29.57 kg/m²   Physical Exam   Constitutional: She is oriented to person, place, and time. She appears well-developed and well-nourished.   HENT:   Head: Normocephalic and atraumatic.   Right Ear: External ear normal.   Left Ear: External ear normal.   Nose: Sinus tenderness present. Right sinus exhibits maxillary sinus " tenderness. Left sinus exhibits maxillary sinus tenderness.   Mouth/Throat: Posterior oropharyngeal erythema present.   Eyes: Pupils are equal, round, and reactive to light.   Neck: Normal range of motion. Neck supple.   Cardiovascular: Normal rate, regular rhythm and normal heart sounds.   Pulmonary/Chest: Effort normal and breath sounds normal. No respiratory distress. She has no wheezes. She has no rales.   Abdominal: Soft. Bowel sounds are normal.   Musculoskeletal: Normal range of motion.   Neurological: She is alert and oriented to person, place, and time.   Skin: Skin is warm and dry.   Psychiatric: She has a normal mood and affect.   Nursing note and vitals reviewed.      Assessment/Plan   Problems Addressed this Visit     None      Visit Diagnoses     Acute non-recurrent maxillary sinusitis    -  Primary    Relevant Medications    triamcinolone acetonide (KENALOG-40) injection 40 mg (Start on 11/12/2018 10:13 AM)    cefTRIAXone (ROCEPHIN) injection 1 g (Start on 11/12/2018 10:13 AM)        Kenalog IM and Rocephin IM today  Continue with nasal spray  Recommend a vaporizer / humidifier in the bedroom  Recheck as needed

## 2018-11-29 ENCOUNTER — OFFICE VISIT (OUTPATIENT)
Dept: FAMILY MEDICINE CLINIC | Facility: CLINIC | Age: 83
End: 2018-11-29

## 2018-11-29 VITALS
DIASTOLIC BLOOD PRESSURE: 66 MMHG | HEART RATE: 70 BPM | SYSTOLIC BLOOD PRESSURE: 124 MMHG | TEMPERATURE: 97.5 F | OXYGEN SATURATION: 98 % | HEIGHT: 63 IN | BODY MASS INDEX: 29.16 KG/M2 | WEIGHT: 164.6 LBS

## 2018-11-29 DIAGNOSIS — I10 ESSENTIAL HYPERTENSION: ICD-10-CM

## 2018-11-29 DIAGNOSIS — F41.1 GENERALIZED ANXIETY DISORDER: ICD-10-CM

## 2018-11-29 DIAGNOSIS — G89.29 CHRONIC MIDLINE LOW BACK PAIN WITHOUT SCIATICA: ICD-10-CM

## 2018-11-29 DIAGNOSIS — E11.42 TYPE 2 DIABETES MELLITUS WITH DIABETIC POLYNEUROPATHY, WITHOUT LONG-TERM CURRENT USE OF INSULIN (HCC): Primary | ICD-10-CM

## 2018-11-29 DIAGNOSIS — M54.50 CHRONIC MIDLINE LOW BACK PAIN WITHOUT SCIATICA: ICD-10-CM

## 2018-11-29 PROBLEM — E66.09 CLASS 1 OBESITY DUE TO EXCESS CALORIES WITHOUT SERIOUS COMORBIDITY WITH BODY MASS INDEX (BMI) OF 30.0 TO 30.9 IN ADULT: Status: RESOLVED | Noted: 2018-05-03 | Resolved: 2018-11-29

## 2018-11-29 PROCEDURE — 99214 OFFICE O/P EST MOD 30 MIN: CPT | Performed by: FAMILY MEDICINE

## 2018-11-29 RX ORDER — ALPRAZOLAM 1 MG/1
1 TABLET ORAL NIGHTLY PRN
Qty: 30 TABLET | Refills: 2 | Status: SHIPPED | OUTPATIENT
Start: 2018-11-29 | End: 2019-02-21 | Stop reason: SDUPTHER

## 2018-11-29 RX ORDER — HYDROCODONE BITARTRATE AND ACETAMINOPHEN 7.5; 325 MG/1; MG/1
1 TABLET ORAL 2 TIMES DAILY PRN
Qty: 60 TABLET | Refills: 0 | Status: SHIPPED | OUTPATIENT
Start: 2018-11-29 | End: 2018-12-21 | Stop reason: SDUPTHER

## 2018-11-29 NOTE — PROGRESS NOTES
"Subjective   Chief Complaint   Patient presents with   • Med Refill     hydrocodone last dose this morning   • Back Pain     Tatyana Yeager is a 84 y.o. female.   Med Refill (hydrocodone last dose this morning) and Back Pain    History of Present Illness     Diabetes - currently managed with amaryl  Denies polyphagia, polydipsia, and polyuria  She was discontinued glipizide due to elevated creatinine and was recommended that she follow a diabetic diet for management  She admits to struggling with a restricted diet and consuming more fruit lately    Blood pressure remains controlled with metoprolol, norvasc, lisinopril    She was scheduled to follow up with Dr Grimm but this was canceled by the patient  Having issues ranexa coverage    Due for controlled medication refills - xanax, norco  Xanax - for generalized anxiety disorder  norco - chronic back pain and joint pain    C/o nasal congestion and rhinorrhea  Using flonase and claritin as needed    The following portions of the patient's history were reviewed and updated as appropriate: allergies, current medications, past family history, past medical history, past social history, past surgical history and problem list.    Review of Systems   Constitutional: Negative for appetite change, chills, fatigue and fever.   HENT: Positive for congestion and rhinorrhea. Negative for ear pain and sore throat.    Eyes: Negative for pain.   Respiratory: Negative for cough and shortness of breath.    Cardiovascular: Negative for chest pain and palpitations.   Gastrointestinal: Negative for abdominal pain, constipation, diarrhea and nausea.   Genitourinary: Negative for dysuria.   Musculoskeletal: Negative for back pain, joint swelling and neck pain.   Skin: Negative for rash.   Neurological: Negative for dizziness and headaches.       Objective   /66   Pulse 70   Temp 97.5 °F (36.4 °C)   Ht 158.8 cm (62.52\")   Wt 74.7 kg (164 lb 9.6 oz)   LMP 03/07/1971 (Within " Months) Comment: Hysterectomy  SpO2 98%   BMI 29.61 kg/m²   Physical Exam   Constitutional: She is oriented to person, place, and time. She appears well-developed and well-nourished.   HENT:   Head: Normocephalic and atraumatic.   Eyes: Pupils are equal, round, and reactive to light.   Neck: Normal range of motion. Neck supple.   Cardiovascular: Normal rate, regular rhythm and normal heart sounds.   Pulmonary/Chest: Effort normal and breath sounds normal. No respiratory distress. She has no wheezes. She has no rales.   Abdominal: Soft. Bowel sounds are normal.   Musculoskeletal: Normal range of motion.   Neurological: She is alert and oriented to person, place, and time.   Skin: Skin is warm and dry.   Psychiatric: She has a normal mood and affect.   Nursing note and vitals reviewed.      Assessment/Plan   Problems Addressed this Visit        Cardiovascular and Mediastinum    Essential hypertension       Endocrine    Type 2 diabetes mellitus with diabetic polyneuropathy (CMS/HCC) - Primary    Relevant Orders    Hemoglobin A1c    MicroAlbumin, Urine, Random - Urine, Clean Catch    Basic metabolic panel       Nervous and Auditory    Chronic midline low back pain without sciatica    Relevant Medications    HYDROcodone-acetaminophen (NORCO) 7.5-325 MG per tablet       Other    Generalized anxiety disorder    Relevant Medications    ALPRAZolam (XANAX) 1 MG tablet        The patient has read and signed the [facility] Controlled Substance Contract.  I will continue to see patient for regular follow up appointments.  They are well controlled on their medication.  DONAL is updated every 3 months. The patient is aware of the potential for addiction and dependence.  donal reviewed [id number]  Refilled xanax - changed to 30 day supply  Refilled norco    Patient's Body mass index is 29.61 kg/m². BMI is above normal parameters. Recommendations include: exercise counseling and nutrition counseling.    Diabetic labs  ordered    Use flonase, claritin scheduled for nasal congestion    Reschedule cardiology appointment    Recheck in 3 months or sooner as needed

## 2018-12-11 DIAGNOSIS — E11.42 TYPE 2 DIABETES MELLITUS WITH DIABETIC POLYNEUROPATHY, WITHOUT LONG-TERM CURRENT USE OF INSULIN (HCC): ICD-10-CM

## 2018-12-12 RX ORDER — LISINOPRIL 2.5 MG/1
TABLET ORAL
Qty: 90 TABLET | Refills: 1 | Status: SHIPPED | OUTPATIENT
Start: 2018-12-12 | End: 2019-08-20 | Stop reason: SDUPTHER

## 2018-12-12 RX ORDER — GLIMEPIRIDE 1 MG/1
TABLET ORAL
Qty: 90 TABLET | Refills: 0 | Status: SHIPPED | OUTPATIENT
Start: 2018-12-12 | End: 2019-02-21 | Stop reason: SDUPTHER

## 2018-12-14 ENCOUNTER — LAB (OUTPATIENT)
Dept: LAB | Facility: OTHER | Age: 83
End: 2018-12-14

## 2018-12-14 DIAGNOSIS — E11.42 TYPE 2 DIABETES MELLITUS WITH DIABETIC POLYNEUROPATHY, WITHOUT LONG-TERM CURRENT USE OF INSULIN (HCC): ICD-10-CM

## 2018-12-14 DIAGNOSIS — E87.5 SERUM POTASSIUM ELEVATED: Primary | ICD-10-CM

## 2018-12-14 LAB
ALBUMIN UR-MCNC: 3.1 MG/L
ANION GAP SERPL CALCULATED.3IONS-SCNC: 6 MMOL/L (ref 5–15)
BUN BLD-MCNC: 20 MG/DL (ref 7–17)
BUN/CREAT SERPL: 15.6 (ref 7–25)
CALCIUM SPEC-SCNC: 9.7 MG/DL (ref 8.4–10.2)
CHLORIDE SERPL-SCNC: 107 MMOL/L (ref 98–107)
CO2 SERPL-SCNC: 29 MMOL/L (ref 22–30)
CREAT BLD-MCNC: 1.28 MG/DL (ref 0.52–1.04)
GFR SERPL CREATININE-BSD FRML MDRD: 40 ML/MIN/1.73 (ref 39–90)
GLUCOSE BLD-MCNC: 90 MG/DL (ref 74–99)
HBA1C MFR BLD: 5.7 % (ref 4–5.6)
POTASSIUM BLD-SCNC: 5.1 MMOL/L (ref 3.4–5)
SODIUM BLD-SCNC: 142 MMOL/L (ref 137–145)

## 2018-12-14 PROCEDURE — 80048 BASIC METABOLIC PNL TOTAL CA: CPT | Performed by: FAMILY MEDICINE

## 2018-12-14 PROCEDURE — 83036 HEMOGLOBIN GLYCOSYLATED A1C: CPT | Performed by: FAMILY MEDICINE

## 2018-12-14 PROCEDURE — 36415 COLL VENOUS BLD VENIPUNCTURE: CPT | Performed by: FAMILY MEDICINE

## 2018-12-14 PROCEDURE — 82043 UR ALBUMIN QUANTITATIVE: CPT | Performed by: FAMILY MEDICINE

## 2018-12-17 ENCOUNTER — TELEPHONE (OUTPATIENT)
Dept: FAMILY MEDICINE CLINIC | Facility: CLINIC | Age: 83
End: 2018-12-17

## 2018-12-21 DIAGNOSIS — G89.29 CHRONIC MIDLINE LOW BACK PAIN WITHOUT SCIATICA: ICD-10-CM

## 2018-12-21 DIAGNOSIS — M54.50 CHRONIC MIDLINE LOW BACK PAIN WITHOUT SCIATICA: ICD-10-CM

## 2018-12-26 ENCOUNTER — TELEPHONE (OUTPATIENT)
Dept: FAMILY MEDICINE CLINIC | Facility: CLINIC | Age: 83
End: 2018-12-26

## 2018-12-26 RX ORDER — HYDROCODONE BITARTRATE AND ACETAMINOPHEN 7.5; 325 MG/1; MG/1
1 TABLET ORAL 2 TIMES DAILY PRN
Qty: 14 TABLET | Refills: 0 | Status: SHIPPED | OUTPATIENT
Start: 2018-12-26 | End: 2019-01-02 | Stop reason: SDUPTHER

## 2018-12-28 ENCOUNTER — LAB (OUTPATIENT)
Dept: LAB | Facility: OTHER | Age: 83
End: 2018-12-28

## 2018-12-28 DIAGNOSIS — E87.5 SERUM POTASSIUM ELEVATED: ICD-10-CM

## 2018-12-28 LAB
ANION GAP SERPL CALCULATED.3IONS-SCNC: 9 MMOL/L (ref 5–15)
BUN BLD-MCNC: 19 MG/DL (ref 7–17)
BUN/CREAT SERPL: 15.3 (ref 7–25)
CALCIUM SPEC-SCNC: 9.6 MG/DL (ref 8.4–10.2)
CHLORIDE SERPL-SCNC: 108 MMOL/L (ref 98–107)
CO2 SERPL-SCNC: 27 MMOL/L (ref 22–30)
CREAT BLD-MCNC: 1.24 MG/DL (ref 0.52–1.04)
GFR SERPL CREATININE-BSD FRML MDRD: 41 ML/MIN/1.73 (ref 39–90)
GLUCOSE BLD-MCNC: 91 MG/DL (ref 74–99)
POTASSIUM BLD-SCNC: 4.4 MMOL/L (ref 3.4–5)
SODIUM BLD-SCNC: 144 MMOL/L (ref 137–145)

## 2018-12-28 PROCEDURE — 36415 COLL VENOUS BLD VENIPUNCTURE: CPT | Performed by: FAMILY MEDICINE

## 2018-12-28 PROCEDURE — 80048 BASIC METABOLIC PNL TOTAL CA: CPT | Performed by: FAMILY MEDICINE

## 2018-12-31 ENCOUNTER — TELEPHONE (OUTPATIENT)
Dept: FAMILY MEDICINE CLINIC | Facility: CLINIC | Age: 83
End: 2018-12-31

## 2018-12-31 NOTE — TELEPHONE ENCOUNTER
----- Message from Sri Mon MD sent at 12/30/2018 12:55 PM CST -----  Potassium is normal and creatinine is great.  Stable at 1.2.  No changes needed.

## 2019-01-02 ENCOUNTER — TELEPHONE (OUTPATIENT)
Dept: FAMILY MEDICINE CLINIC | Facility: CLINIC | Age: 84
End: 2019-01-02

## 2019-01-02 DIAGNOSIS — M54.50 CHRONIC MIDLINE LOW BACK PAIN WITHOUT SCIATICA: ICD-10-CM

## 2019-01-02 DIAGNOSIS — G89.29 CHRONIC MIDLINE LOW BACK PAIN WITHOUT SCIATICA: ICD-10-CM

## 2019-01-02 RX ORDER — HYDROCODONE BITARTRATE AND ACETAMINOPHEN 7.5; 325 MG/1; MG/1
1 TABLET ORAL 2 TIMES DAILY PRN
Qty: 46 TABLET | Refills: 0 | Status: SHIPPED | OUTPATIENT
Start: 2019-01-02 | End: 2019-01-24 | Stop reason: SDUPTHER

## 2019-01-24 DIAGNOSIS — M54.50 CHRONIC MIDLINE LOW BACK PAIN WITHOUT SCIATICA: ICD-10-CM

## 2019-01-24 DIAGNOSIS — G89.29 CHRONIC MIDLINE LOW BACK PAIN WITHOUT SCIATICA: ICD-10-CM

## 2019-01-24 RX ORDER — HYDROCODONE BITARTRATE AND ACETAMINOPHEN 7.5; 325 MG/1; MG/1
1 TABLET ORAL 2 TIMES DAILY PRN
Qty: 60 TABLET | Refills: 0 | Status: SHIPPED | OUTPATIENT
Start: 2019-01-24 | End: 2019-02-21 | Stop reason: SDUPTHER

## 2019-02-04 ENCOUNTER — OFFICE VISIT (OUTPATIENT)
Dept: FAMILY MEDICINE CLINIC | Facility: CLINIC | Age: 84
End: 2019-02-04

## 2019-02-04 VITALS
HEIGHT: 61 IN | BODY MASS INDEX: 31.34 KG/M2 | SYSTOLIC BLOOD PRESSURE: 140 MMHG | HEART RATE: 77 BPM | DIASTOLIC BLOOD PRESSURE: 62 MMHG | TEMPERATURE: 98.7 F | WEIGHT: 166 LBS

## 2019-02-04 DIAGNOSIS — R05.8 PRODUCTIVE COUGH: ICD-10-CM

## 2019-02-04 DIAGNOSIS — J06.9 ACUTE UPPER RESPIRATORY INFECTION: Primary | ICD-10-CM

## 2019-02-04 PROCEDURE — 99213 OFFICE O/P EST LOW 20 MIN: CPT | Performed by: FAMILY MEDICINE

## 2019-02-04 RX ORDER — AMOXICILLIN AND CLAVULANATE POTASSIUM 875; 125 MG/1; MG/1
1 TABLET, FILM COATED ORAL 2 TIMES DAILY
Qty: 20 TABLET | Refills: 0 | Status: SHIPPED | OUTPATIENT
Start: 2019-02-04 | End: 2019-02-14

## 2019-02-04 RX ORDER — BENZONATATE 100 MG/1
100 CAPSULE ORAL 3 TIMES DAILY PRN
Qty: 30 CAPSULE | Refills: 0 | Status: SHIPPED | OUTPATIENT
Start: 2019-02-04 | End: 2019-02-15

## 2019-02-04 NOTE — PROGRESS NOTES
"Subjective   Chief Complaint   Patient presents with   • Illness     Poss flu   • Cough     Tatyana Yeager is a 84 y.o. female.   Illness (Poss flu) and Cough    History of Present Illness     Presents today with complaints of possible influenza  Had exposure to sick contacts at home by family  Had complaints of productive cough, shortness of air, fever, chills, sinus drainage, chest soreness and tightness  Has tried tylenol - last dose was 7am    The following portions of the patient's history were reviewed and updated as appropriate: allergies, current medications, past family history, past medical history, past social history, past surgical history and problem list.    Review of Systems   Constitutional: Positive for chills, fatigue and fever.   HENT: Positive for postnasal drip, sinus pressure and sore throat.    Respiratory: Positive for cough, chest tightness, shortness of breath and wheezing.        Objective   /62   Pulse 77   Temp 98.7 °F (37.1 °C)   Ht 154.9 cm (60.98\")   Wt 75.3 kg (166 lb)   LMP 03/07/1971 (Within Months) Comment: Hysterectomy  BMI 31.39 kg/m²   Physical Exam   Constitutional: She is oriented to person, place, and time. She appears well-developed and well-nourished.   HENT:   Head: Normocephalic and atraumatic.   Right Ear: External ear normal.   Left Ear: External ear normal.   Nose: Nose normal.   Mouth/Throat: Oropharynx is clear and moist.   Eyes: Pupils are equal, round, and reactive to light.   Neck: Normal range of motion. Neck supple.   Cardiovascular: Normal rate, regular rhythm and normal heart sounds.   Pulmonary/Chest: Effort normal. No respiratory distress. She has decreased breath sounds. She has wheezes. She has no rales.   Neurological: She is alert and oriented to person, place, and time.   Skin: Skin is warm and dry.   Psychiatric: She has a normal mood and affect.   Nursing note and vitals reviewed.      Assessment/Plan   Problems Addressed this Visit     " None      Visit Diagnoses     Acute upper respiratory infection    -  Primary    Relevant Medications    amoxicillin-clavulanate (AUGMENTIN) 875-125 MG per tablet    Productive cough        Relevant Orders    XR Chest 2 View (Completed)        CXR ordered - negative results for acute pulmonary infection  Will start augmentin for coverage  Start tessalon perles for cough  Use tylenol as needed for fever  Recheck as needed

## 2019-02-15 RX ORDER — GUAIFENESIN AND DEXTROMETHORPHAN HYDROBROMIDE 600; 30 MG/1; MG/1
1 TABLET, EXTENDED RELEASE ORAL 2 TIMES DAILY
Qty: 14 TABLET | Refills: 0 | Status: SHIPPED | OUTPATIENT
Start: 2019-02-15 | End: 2019-02-21

## 2019-02-17 DIAGNOSIS — F41.1 GENERALIZED ANXIETY DISORDER: ICD-10-CM

## 2019-02-20 RX ORDER — ALPRAZOLAM 1 MG/1
1 TABLET ORAL NIGHTLY PRN
Qty: 30 TABLET | OUTPATIENT
Start: 2019-02-20

## 2019-02-21 ENCOUNTER — TELEPHONE (OUTPATIENT)
Dept: FAMILY MEDICINE CLINIC | Facility: CLINIC | Age: 84
End: 2019-02-21

## 2019-02-21 ENCOUNTER — OFFICE VISIT (OUTPATIENT)
Dept: FAMILY MEDICINE CLINIC | Facility: CLINIC | Age: 84
End: 2019-02-21

## 2019-02-21 ENCOUNTER — LAB (OUTPATIENT)
Dept: LAB | Facility: OTHER | Age: 84
End: 2019-02-21

## 2019-02-21 VITALS
DIASTOLIC BLOOD PRESSURE: 76 MMHG | BODY MASS INDEX: 31.19 KG/M2 | WEIGHT: 165.2 LBS | SYSTOLIC BLOOD PRESSURE: 124 MMHG | TEMPERATURE: 98 F | HEART RATE: 65 BPM | OXYGEN SATURATION: 96 % | HEIGHT: 61 IN

## 2019-02-21 DIAGNOSIS — Z51.81 THERAPEUTIC DRUG MONITORING: ICD-10-CM

## 2019-02-21 DIAGNOSIS — E66.09 CLASS 1 OBESITY DUE TO EXCESS CALORIES WITHOUT SERIOUS COMORBIDITY WITH BODY MASS INDEX (BMI) OF 31.0 TO 31.9 IN ADULT: ICD-10-CM

## 2019-02-21 DIAGNOSIS — E03.9 ACQUIRED HYPOTHYROIDISM: ICD-10-CM

## 2019-02-21 DIAGNOSIS — R79.89 ELEVATED SERUM CREATININE: ICD-10-CM

## 2019-02-21 DIAGNOSIS — E11.69 HYPERLIPIDEMIA ASSOCIATED WITH TYPE 2 DIABETES MELLITUS (HCC): ICD-10-CM

## 2019-02-21 DIAGNOSIS — Z00.00 ENCOUNTER FOR MEDICARE ANNUAL WELLNESS EXAM: Primary | ICD-10-CM

## 2019-02-21 DIAGNOSIS — F41.1 GENERALIZED ANXIETY DISORDER: ICD-10-CM

## 2019-02-21 DIAGNOSIS — E78.5 HYPERLIPIDEMIA ASSOCIATED WITH TYPE 2 DIABETES MELLITUS (HCC): ICD-10-CM

## 2019-02-21 DIAGNOSIS — G89.29 CHRONIC MIDLINE LOW BACK PAIN WITHOUT SCIATICA: ICD-10-CM

## 2019-02-21 DIAGNOSIS — E11.42 TYPE 2 DIABETES MELLITUS WITH DIABETIC POLYNEUROPATHY, WITHOUT LONG-TERM CURRENT USE OF INSULIN (HCC): ICD-10-CM

## 2019-02-21 DIAGNOSIS — G47.00 INSOMNIA, UNSPECIFIED TYPE: ICD-10-CM

## 2019-02-21 DIAGNOSIS — I10 ESSENTIAL HYPERTENSION: ICD-10-CM

## 2019-02-21 DIAGNOSIS — K21.9 GASTROESOPHAGEAL REFLUX DISEASE WITHOUT ESOPHAGITIS: ICD-10-CM

## 2019-02-21 DIAGNOSIS — E11.9 ENCOUNTER FOR COMPREHENSIVE DIABETIC FOOT EXAMINATION, TYPE 2 DIABETES MELLITUS (HCC): ICD-10-CM

## 2019-02-21 DIAGNOSIS — E87.5 HYPERKALEMIA: ICD-10-CM

## 2019-02-21 DIAGNOSIS — M54.50 CHRONIC MIDLINE LOW BACK PAIN WITHOUT SCIATICA: ICD-10-CM

## 2019-02-21 LAB
ALBUMIN SERPL-MCNC: 4.4 G/DL (ref 3.5–5)
ALBUMIN/GLOB SERPL: 1.4 G/DL (ref 1.1–1.8)
ALP SERPL-CCNC: 84 U/L (ref 38–126)
ALT SERPL W P-5'-P-CCNC: 15 U/L
ANION GAP SERPL CALCULATED.3IONS-SCNC: 9 MMOL/L (ref 5–15)
AST SERPL-CCNC: 19 U/L (ref 14–36)
BILIRUB SERPL-MCNC: 0.6 MG/DL (ref 0.2–1.3)
BUN BLD-MCNC: 19 MG/DL (ref 7–17)
BUN/CREAT SERPL: 15.7 (ref 7–25)
CALCIUM SPEC-SCNC: 9.1 MG/DL (ref 8.4–10.2)
CHLORIDE SERPL-SCNC: 103 MMOL/L (ref 98–107)
CO2 SERPL-SCNC: 28 MMOL/L (ref 22–30)
CREAT BLD-MCNC: 1.21 MG/DL (ref 0.52–1.04)
GFR SERPL CREATININE-BSD FRML MDRD: 42 ML/MIN/1.73 (ref 39–90)
GLOBULIN UR ELPH-MCNC: 3.1 GM/DL (ref 2.3–3.5)
GLUCOSE BLD-MCNC: 84 MG/DL (ref 74–99)
POTASSIUM BLD-SCNC: 4.3 MMOL/L (ref 3.4–5)
PROT SERPL-MCNC: 7.5 G/DL (ref 6.3–8.2)
SODIUM BLD-SCNC: 140 MMOL/L (ref 137–145)

## 2019-02-21 PROCEDURE — 80307 DRUG TEST PRSMV CHEM ANLYZR: CPT | Performed by: FAMILY MEDICINE

## 2019-02-21 PROCEDURE — 80053 COMPREHEN METABOLIC PANEL: CPT | Performed by: FAMILY MEDICINE

## 2019-02-21 PROCEDURE — G0481 DRUG TEST DEF 8-14 CLASSES: HCPCS | Performed by: FAMILY MEDICINE

## 2019-02-21 PROCEDURE — 36415 COLL VENOUS BLD VENIPUNCTURE: CPT | Performed by: FAMILY MEDICINE

## 2019-02-21 PROCEDURE — 99213 OFFICE O/P EST LOW 20 MIN: CPT | Performed by: FAMILY MEDICINE

## 2019-02-21 PROCEDURE — G0439 PPPS, SUBSEQ VISIT: HCPCS | Performed by: FAMILY MEDICINE

## 2019-02-21 RX ORDER — ISOSORBIDE MONONITRATE 30 MG/1
30 TABLET, EXTENDED RELEASE ORAL DAILY
Qty: 90 TABLET | Refills: 1 | Status: SHIPPED | OUTPATIENT
Start: 2019-02-21 | End: 2019-08-29 | Stop reason: SDUPTHER

## 2019-02-21 RX ORDER — CLOPIDOGREL BISULFATE 75 MG/1
75 TABLET ORAL DAILY
Qty: 90 TABLET | Refills: 1 | Status: SHIPPED | OUTPATIENT
Start: 2019-02-21 | End: 2019-10-07 | Stop reason: SDUPTHER

## 2019-02-21 RX ORDER — ALPRAZOLAM 1 MG/1
1 TABLET ORAL NIGHTLY PRN
Qty: 30 TABLET | Refills: 0 | Status: SHIPPED | OUTPATIENT
Start: 2019-02-21 | End: 2019-03-20 | Stop reason: SDUPTHER

## 2019-02-21 RX ORDER — EZETIMIBE 10 MG/1
10 TABLET ORAL DAILY
Qty: 90 TABLET | Refills: 1 | Status: SHIPPED | OUTPATIENT
Start: 2019-02-21 | End: 2019-06-20

## 2019-02-21 RX ORDER — METOPROLOL SUCCINATE 50 MG/1
50 TABLET, EXTENDED RELEASE ORAL DAILY
Qty: 90 TABLET | Refills: 1 | Status: SHIPPED | OUTPATIENT
Start: 2019-02-21 | End: 2019-08-29 | Stop reason: SDUPTHER

## 2019-02-21 RX ORDER — FAMOTIDINE 20 MG/1
20 TABLET, FILM COATED ORAL 2 TIMES DAILY PRN
Qty: 180 TABLET | Refills: 1 | Status: SHIPPED | OUTPATIENT
Start: 2019-02-21 | End: 2019-08-29 | Stop reason: SDUPTHER

## 2019-02-21 RX ORDER — AMLODIPINE BESYLATE 5 MG/1
5 TABLET ORAL DAILY
Qty: 90 TABLET | Refills: 1 | Status: SHIPPED | OUTPATIENT
Start: 2019-02-21 | End: 2019-10-07 | Stop reason: SDUPTHER

## 2019-02-21 RX ORDER — DOXEPIN HYDROCHLORIDE 100 MG/1
100 CAPSULE ORAL NIGHTLY
Qty: 90 CAPSULE | Refills: 1 | Status: SHIPPED | OUTPATIENT
Start: 2019-02-21 | End: 2019-10-07 | Stop reason: SDUPTHER

## 2019-02-21 RX ORDER — HYDROCODONE BITARTRATE AND ACETAMINOPHEN 7.5; 325 MG/1; MG/1
1 TABLET ORAL 2 TIMES DAILY PRN
Qty: 60 TABLET | Refills: 0 | Status: SHIPPED | OUTPATIENT
Start: 2019-02-21 | End: 2019-03-20 | Stop reason: SDUPTHER

## 2019-02-21 RX ORDER — LEVOTHYROXINE SODIUM 88 UG/1
88 TABLET ORAL DAILY
Qty: 90 TABLET | Refills: 0 | Status: SHIPPED | OUTPATIENT
Start: 2019-02-21 | End: 2019-05-21 | Stop reason: SDUPTHER

## 2019-02-21 RX ORDER — GLIMEPIRIDE 1 MG/1
0.5 TABLET ORAL 2 TIMES DAILY
Qty: 90 TABLET | Refills: 0 | Status: SHIPPED | OUTPATIENT
Start: 2019-02-21 | End: 2019-05-28 | Stop reason: SDUPTHER

## 2019-02-21 NOTE — PROGRESS NOTES
"Subjective   Chief Complaint   Patient presents with   • Diabetes     3 mo f/u     Tatyana Yeager is a 84 y.o. female.   Diabetes (3 mo f/u)    History of Present Illness     Diabetes - currently managed with amaryl  Denies polyphagia, polydipsia, and polyuria  She was discontinued glipizide due to elevated creatinine and was recommended that she follow a diabetic diet for management  Diabetic foot exam due today  Diabetic eye exam due - she had this done recently in [place]    Asking for medication refills to her mail order pharmacy    C/o lower extremity edema    The following portions of the patient's history were reviewed and updated as appropriate: allergies, current medications, past family history, past medical history, past social history, past surgical history and problem list.    Review of Systems   Constitutional: Negative for appetite change, chills, fatigue and fever.   HENT: Negative for congestion, ear pain, rhinorrhea and sore throat.    Eyes: Negative for pain.   Respiratory: Negative for cough and shortness of breath.    Cardiovascular: Positive for leg swelling. Negative for chest pain and palpitations.   Gastrointestinal: Negative for abdominal pain, constipation, diarrhea and nausea.   Genitourinary: Negative for dysuria.   Musculoskeletal: Negative for back pain, joint swelling and neck pain.   Skin: Negative for rash.   Neurological: Negative for dizziness and headaches.       Objective   /76   Pulse 65   Temp 98 °F (36.7 °C)   Ht 154.9 cm (60.98\")   Wt 74.9 kg (165 lb 3.2 oz)   LMP 03/07/1971 (Within Months) Comment: Hysterectomy  SpO2 96%   BMI 31.23 kg/m²   Physical Exam   Constitutional: She is oriented to person, place, and time. She appears well-developed and well-nourished.   HENT:   Head: Normocephalic and atraumatic.   Eyes: Pupils are equal, round, and reactive to light.   Neck: Normal range of motion. Neck supple.   Cardiovascular: Normal rate, regular rhythm and " normal heart sounds.   Pulmonary/Chest: Effort normal and breath sounds normal. No respiratory distress. She has no wheezes. She has no rales.   Abdominal: Soft. Bowel sounds are normal.   Musculoskeletal: Normal range of motion.    Tatyana had a diabetic foot exam performed today.   During the foot exam she had a monofilament test performed (R9/10, L10/10, tenderness at bilateral forefoot).    Neurological Sensory Findings -  Unaltered sharp/dull right ankle/foot discrimination and unaltered sharp/dull left ankle/foot discrimination. No right ankle/foot altered proprioception and no left ankle/foot altered proprioception  Vascular Status -  Her right foot exhibits abnormal foot edema. Her right foot exhibits normal foot vasculature . Her left foot exhibits abnormal foot edema. Her left foot exhibits normal foot vasculature .  Skin Integrity  -  Her right foot skin is intact.  She has no right foot onychomycosis, no right foot ulcer, non-callous right foot, no ingrown toenail on right foot, right heel is not dry and cracked, no right foot warmth, no right foot blister and no right foot gangrenous changes.Her left foot skin is intact. She has no left foot onychomycosis, no left foot ulcer, non-callous left foot, no left ingrown toenail, no left heel dry and cracked, no left foot warmth, no left foot blister and no left foot gangrenous changes..  Neurological: She is alert and oriented to person, place, and time. She has normal strength and normal reflexes. No cranial nerve deficit or sensory deficit.   Skin: Skin is warm and dry.   Psychiatric: She has a normal mood and affect.   Nursing note and vitals reviewed.      Assessment/Plan   Problems Addressed this Visit        Cardiovascular and Mediastinum    Essential hypertension    Relevant Medications    amLODIPine (NORVASC) 5 MG tablet    isosorbide mononitrate (IMDUR) 30 MG 24 hr tablet    metoprolol succinate XL (TOPROL-XL) 50 MG 24 hr tablet       Digestive     Gastroesophageal reflux disease without esophagitis    Relevant Medications    famotidine (PEPCID) 20 MG tablet       Endocrine    Type 2 diabetes mellitus with diabetic polyneuropathy (CMS/HCC)    Relevant Medications    glimepiride (AMARYL) 1 MG tablet    Acquired hypothyroidism    Relevant Medications    metoprolol succinate XL (TOPROL-XL) 50 MG 24 hr tablet    levothyroxine (SYNTHROID, LEVOTHROID) 88 MCG tablet    Encounter for comprehensive diabetic foot examination, type 2 diabetes mellitus (CMS/HCC)    Relevant Medications    glimepiride (AMARYL) 1 MG tablet       Nervous and Auditory    Chronic midline low back pain without sciatica    Relevant Medications    HYDROcodone-acetaminophen (NORCO) 7.5-325 MG per tablet       Other    Generalized anxiety disorder    Relevant Medications    ALPRAZolam (XANAX) 1 MG tablet    doxepin (SINEquan) 100 MG capsule      Other Visit Diagnoses     Encounter for Medicare annual wellness exam    -  Primary    Therapeutic drug monitoring        Relevant Orders    ToxASSURE Select 13 (MW) - Urine, Clean Catch    Elevated serum creatinine        Relevant Orders    Comprehensive Metabolic Panel (Completed)    Insomnia, unspecified type        Relevant Medications    doxepin (SINEquan) 100 MG capsule    Hyperlipidemia associated with type 2 diabetes mellitus (CMS/HCC)        Relevant Medications    ezetimibe (ZETIA) 10 MG tablet    glimepiride (AMARYL) 1 MG tablet    Class 1 obesity due to excess calories without serious comorbidity with body mass index (BMI) of 31.0 to 31.9 in adult            Diabetic foot exam done today  Script for diabetic shoes provided  Paperwork filled out for [facility] for diabetic shoes    Refilled all maintenance medications 90 day supply to mail order pharmacy    Call made for a copy of diabetic eye examination - [facility]ikh office    Medicare wellness visit done    Ordered lab work to recheck on creatinine level    The patient has read and  signed the [facility] Controlled Substance Contract.  I will continue to see patient for regular follow up appointments.  They are well controlled on their medication.  DONAL is updated every 3 months. The patient is aware of the potential for addiction and dependence.  donal [phone]  Refilled xanax  Refilled norco    Patient's Body mass index is 31.23 kg/m². BMI is above normal parameters. Recommendations include: exercise counseling and nutrition counseling.    Recheck in 4 months or sooner as needed

## 2019-02-21 NOTE — TELEPHONE ENCOUNTER
----- Message from Sri Mon MD sent at 2/21/2019  4:19 PM CST -----  Creatinine has improved to 1.21 from 1.24 - continue with current medical management with avoiding diuretics

## 2019-02-21 NOTE — PATIENT INSTRUCTIONS
Advance Directive  Advance directives are legal documents that let you make choices ahead of time about your health care and medical treatment in case you become unable to communicate for yourself. Advance directives are a way for you to communicate your wishes to family, friends, and health care providers. This can help convey your decisions about end-of-life care if you become unable to communicate.  Discussing and writing advance directives should happen over time rather than all at once. Advance directives can be changed depending on your situation and what you want, even after you have signed the advance directives.  If you do not have an advance directive, some states assign family decision makers to act on your behalf based on how closely you are related to them. Each state has its own laws regarding advance directives. You may want to check with your health care provider, , or state representative about the laws in your state. There are different types of advance directives, such as:  · Medical power of .  · Living will.  · Do not resuscitate (DNR) or do not attempt resuscitation (DNAR) order.    Health care proxy and medical power of   A health care proxy, also called a health care agent, is a person who is appointed to make medical decisions for you in cases in which you are unable to make the decisions yourself. Generally, people choose someone they know well and trust to represent their preferences. Make sure to ask this person for an agreement to act as your proxy. A proxy may have to exercise judgment in the event of a medical decision for which your wishes are not known.  A medical power of  is a legal document that names your health care proxy. Depending on the laws in your state, after the document is written, it may also need to be:  · Signed.  · Notarized.  · Dated.  · Copied.  · Witnessed.  · Incorporated into your medical record.    You may also want to appoint  someone to manage your financial affairs in a situation in which you are unable to do so. This is called a durable power of  for finances. It is a separate legal document from the durable power of  for health care. You may choose the same person or someone different from your health care proxy to act as your agent in financial matters.  If you do not appoint a proxy, or if there is a concern that the proxy is not acting in your best interests, a court-appointed guardian may be designated to act on your behalf.  Living will  A living will is a set of instructions documenting your wishes about medical care when you cannot express them yourself. Health care providers should keep a copy of your living will in your medical record. You may want to give a copy to family members or friends. To alert caregivers in case of an emergency, you can place a card in your wallet to let them know that you have a living will and where they can find it. A living will is used if you become:  · Terminally ill.  · Incapacitated.  · Unable to communicate or make decisions.    Items to consider in your living will include:  · The use or non-use of life-sustaining equipment, such as dialysis machines and breathing machines (ventilators).  · A DNR or DNAR order, which is the instruction not to use cardiopulmonary resuscitation (CPR) if breathing or heartbeat stops.  · The use or non-use of tube feeding.  · Withholding of food and fluids.  · Comfort (palliative) care when the goal becomes comfort rather than a cure.  · Organ and tissue donation.    A living will does not give instructions for distributing your money and property if you should pass away. It is recommended that you seek the advice of a  when writing a will. Decisions about taxes, beneficiaries, and asset distribution will be legally binding. This process can relieve your family and friends of any concerns surrounding disputes or questions that may come up  about the distribution of your assets.  DNR or DNAR  A DNR or DNAR order is a request not to have CPR in the event that your heart stops beating or you stop breathing. If a DNR or DNAR order has not been made and shared, a health care provider will try to help any patient whose heart has stopped or who has stopped breathing. If you plan to have surgery, talk with your health care provider about how your DNR or DNAR order will be followed if problems occur.  Summary  · Advance directives are the legal documents that allow you to make choices ahead of time about your health care and medical treatment in case you become unable to communicate for yourself.  · The process of discussing and writing advance directives should happen over time. You can change the advance directives, even after you have signed them.  · Advance directives include DNR or DNAR orders, living richard, and designating an agent as your medical power of .  This information is not intended to replace advice given to you by your health care provider. Make sure you discuss any questions you have with your health care provider.  Document Released: 2009 Document Revised: 2017 Document Reviewed: 2017  [employer] Interactive Patient Education © 2017 [employer]    Medicare Wellness  Personal Prevention Plan of Service     Date of Office Visit:  2019  Encounter Provider:  Sri oMn MD  Place of Service:  [facility] [place]  Patient Name: Tatyana Yeager  :  1934    As part of the Medicare Wellness portion of your visit today, we are providing you with this personalized preventive plan of services (PPPS). This plan is based upon recommendations of the [place] Preventive Services Task Force (USPSTF) and the Advisory Committee on Immunization Practices (ACIP).    This lists the preventive care services that should be considered, and provides dates of when you are due. Items  listed as completed are up-to-date and do not require any further intervention.    Health Maintenance   Topic Date Due   • DIABETIC FOOT EXAM  07/21/2018   • MEDICARE ANNUAL WELLNESS  08/16/2018   • DIABETIC EYE EXAM  10/24/2018   • LIPID PANEL  06/06/2019   • HEMOGLOBIN A1C  12/14/2019   • URINE MICROALBUMIN  12/14/2019   • TDAP/TD VACCINES (2 - Td) 06/21/2027   • INFLUENZA VACCINE  Addressed   • PNEUMOCOCCAL VACCINES (65+ LOW/MEDIUM RISK)  Addressed   • ZOSTER VACCINE  Discontinued       No orders of the defined types were placed in this encounter.      No Follow-up on file.

## 2019-02-21 NOTE — PROGRESS NOTES
QUICK REFERENCE INFORMATION:  The ABCs of the Annual Wellness Visit    Subsequent Medicare Wellness Visit    HEALTH RISK ASSESSMENT    1934    Recent Hospitalizations:  Recently treated at the following:  [facility].        Current Medical Providers:  Patient Care Team:  Sri Mon MD as PCP - General (Family Medicine)        Smoking Status:  Social History     Tobacco Use   Smoking Status Former Smoker   • Packs/day: 1.00   • Years: 14.00   • Pack years: 14.00   Smokeless Tobacco Never Used   Tobacco Comment    QUIT AT AGE 35       Alcohol Consumption:  Social History     Substance and Sexual Activity   Alcohol Use No       Depression Screen:   PHQ-2/PHQ-9 Depression Screening 2/21/2019   Little interest or pleasure in doing things 0   Feeling down, depressed, or hopeless 0   Trouble falling or staying asleep, or sleeping too much -   Feeling tired or having little energy -   Poor appetite or overeating -   Feeling bad about yourself - or that you are a failure or have let yourself or your family down -   Trouble concentrating on things, such as reading the newspaper or watching television -   Moving or speaking so slowly that other people could have noticed. Or the opposite - being so fidgety or restless that you have been moving around a lot more than usual -   Thoughts that you would be better off dead, or of hurting yourself in some way -   Total Score 0   If you checked off any problems, how difficult have these problems made it for you to do your work, take care of things at home, or get along with other people? -       Health Habits and Functional and Cognitive Screening:  Functional & Cognitive Status 2/21/2019   Do you have difficulty preparing food and eating? No   Do you have difficulty bathing yourself, getting dressed or grooming yourself? No   Do you have difficulty using the toilet? No   Do you have difficulty moving around from place to place? No   Do you have  trouble with steps or getting out of a bed or a chair? No   In the past year have you fallen or experienced a near fall? No   Current Diet Diabetic Diet   Dental Exam Not up to date   Eye Exam Up to date   Exercise (times per week) 0 times per week   Do you need help using the phone?  No   Are you deaf or do you have serious difficulty hearing?  No   Do you need help with transportation? Yes   Do you need help shopping? No   Do you need help preparing meals?  No   Do you need help with housework?  No   Do you need help with laundry? Yes   Do you need help taking your medications? No   Do you need help managing money? No   Do you ever drive or ride in a car without wearing a seat belt? No   Have you felt unusual stress, anger or loneliness in the last month? No   Who do you live with? Child   If you need help, do you have trouble finding someone available to you? No   Have you been bothered in the last four weeks by sexual problems? No   Do you have difficulty concentrating, remembering or making decisions? No           Does the patient have evidence of cognitive impairment? No    Aspirin use counseling: On clopidrogel as an alternative (due to ASA contraindication)      Recent Lab Results:  CMP:  Lab Results   Component Value Date    BUN 19 (H) 02/21/2019    CREATININE 1.21 (H) 02/21/2019    EGFRIFNONA 42 02/21/2019    BCR 15.7 02/21/2019     02/21/2019    K 4.3 02/21/2019    CO2 28.0 02/21/2019    CALCIUM 9.1 02/21/2019    ALBUMIN 4.40 02/21/2019    BILITOT 0.6 02/21/2019    ALKPHOS 84 02/21/2019    AST 19 02/21/2019    ALT 15 02/21/2019     Lipid Panel:  Lab Results   Component Value Date    CHOL 324 (H) 06/06/2018    TRIG 168 (H) 06/06/2018    HDL 49 06/06/2018    VLDL 33.6 06/06/2018    LDLHDL 4.93 06/06/2018     HbA1c:  Lab Results   Component Value Date    HGBA1C 5.7 (H) 12/14/2018       Visual Acuity:  No exam data present    Age-appropriate Screening Schedule:  Refer to the list below for future  screening recommendations based on patient's age, sex and/or medical conditions. Orders for these recommended tests are listed in the plan section. The patient has been provided with a written plan.    Health Maintenance   Topic Date Due   • DIABETIC EYE EXAM  10/24/2018   • LIPID PANEL  06/06/2019   • HEMOGLOBIN A1C  12/14/2019   • URINE MICROALBUMIN  12/14/2019   • DIABETIC FOOT EXAM  02/21/2020   • TDAP/TD VACCINES (2 - Td) 06/21/2027   • INFLUENZA VACCINE  Addressed   • PNEUMOCOCCAL VACCINES (65+ LOW/MEDIUM RISK)  Addressed   • ZOSTER VACCINE  Discontinued        Subjective   History of Present Illness    Tatyana Yeager is a 84 y.o. female who presents for an Subsequent Wellness Visit.    The following portions of the patient's history were reviewed and updated as appropriate: allergies, current medications, past family history, past medical history, past social history, past surgical history and problem list.    Outpatient Medications Prior to Visit   Medication Sig Dispense Refill   • docusate sodium (COLACE) 250 MG capsule Take 1 capsule by mouth Daily. 30 capsule 6   • glucose blood test strip To test blood glucose 1 - 2 times a day 100 each 3   • glucose monitor monitoring kit For diabetes mellitus 1 each 0   • Lancets misc To test blood glucose 1 -2 times a day 100 each 3   • lisinopril (PRINIVIL,ZESTRIL) 2.5 MG tablet TAKE 1 TABLET EVERY DAY 90 tablet 1   • loratadine (CLARITIN) 10 MG tablet Take 1 tablet by mouth Daily. 30 tablet 0   • nitroglycerin (NITROSTAT) 0.4 MG SL tablet Place 1 tablet under the tongue Every 5 (Five) Minutes As Needed for Chest Pain. Take no more than 3 doses in 15 minutes. 30 tablet 5   • Omega-3 Fatty Acids (FISH OIL) 1000 MG capsule capsule Take 2 capsules by mouth Daily With Breakfast. 270 capsule 1   • ranolazine (RANEXA) 500 MG 12 hr tablet Take 1 tablet by mouth Every 12 (Twelve) Hours. 180 tablet 1   • ALPRAZolam (XANAX) 1 MG tablet Take 1 tablet by mouth At Night As  Needed for Anxiety. 30 tablet 2   • amLODIPine (NORVASC) 5 MG tablet TAKE 1 TABLET EVERY DAY 90 tablet 1   • clopidogrel (PLAVIX) 75 MG tablet TAKE 1 TABLET EVERY DAY 90 tablet 1   • doxepin (SINEquan) 100 MG capsule TAKE 1 CAPSULE EVERY NIGHT 90 capsule 1   • ezetimibe (ZETIA) 10 MG tablet Take 1 tablet by mouth Daily. 30 tablet 0   • famotidine (PEPCID) 20 MG tablet TAKE 1 TABLET BY MOUTH 2 (TWO) TIMES A DAY AS NEEDED FOR HEARTBURN. 180 tablet 1   • glimepiride (AMARYL) 1 MG tablet TAKE 1/2 TABLET TWICE DAILY 90 tablet 0   • guaifenesin-dextromethorphan (MUCINEX DM)  MG tablet sustained-release 12 hour tablet Take 1 tablet by mouth 2 (Two) Times a Day. 14 tablet 0   • HYDROcodone-acetaminophen (NORCO) 7.5-325 MG per tablet Take 1 tablet by mouth 2 (Two) Times a Day As Needed for Moderate Pain . 60 tablet 0   • isosorbide mononitrate (IMDUR) 30 MG 24 hr tablet TAKE 1 TABLET EVERY DAY 90 tablet 1   • levothyroxine (SYNTHROID, LEVOTHROID) 88 MCG tablet TAKE 1 TABLET EVERY DAY 90 tablet 1   • metoprolol succinate XL (TOPROL-XL) 50 MG 24 hr tablet TAKE 1 TABLET EVERY DAY 90 tablet 1     No facility-administered medications prior to visit.        Patient Active Problem List   Diagnosis   • Type 2 diabetes mellitus with diabetic polyneuropathy (CMS/HCC)   • Keratitis sicca, bilateral   • Artificial lens present   • Peripheral arterial disease (CMS/HCC)   • Coronary artery disease involving 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 (CMS/HCC)   • Essential hypertension   • Hyperlipidemia   • BPPV (benign paroxysmal positional vertigo)   • Drug-induced constipation   • DDD (degenerative disc disease), lumbar   • Chronic midline low back pain without sciatica   • Primary osteoarthritis of right shoulder   • Gastroesophageal reflux disease without esophagitis   • Acquired hypothyroidism   • Pneumonia involving right lung   • Generalized anxiety disorder   • Non-rheumatic mitral regurgitation   •  "Nonrheumatic aortic valve stenosis   • Chronic stable angina (CMS/[facility])   • Encounter for comprehensive diabetic foot examination, type 2 diabetes mellitus (CMS/[facility])       Advance Care Planning:  has NO advance directive - information provided to the patient today    Identification of Risk Factors:  Risk factors include: weight , unhealthy diet, cardiovascular risk and chronic pain.    Review of Systems    Compared to one year ago, the patient feels her physical health is the same.  Compared to one year ago, the patient feels her mental health is the same.    Objective     Physical Exam    Vitals:    02/21/19 1310   BP: 124/76   Pulse: 65   Temp: 98 °F (36.7 °C)   SpO2: 96%   Weight: 74.9 kg (165 lb 3.2 oz)   Height: 154.9 cm (60.98\")   PainSc: 0-No pain       Patient's Body mass index is 31.23 kg/m². BMI is above normal parameters. Recommendations include: exercise counseling and nutrition counseling.      Assessment/Plan   Patient Self-Management and Personalized Health Advice  The patient has been provided with information about: diet, exercise, weight management, fall prevention and designing advance directives and preventive services including:   · Advance directive, Exercise counseling provided, Nutrition counseling provided.    Visit Diagnoses:    ICD-10-CM ICD-9-CM   1. Encounter for Medicare annual wellness exam Z00.00 V70.0   2. Therapeutic drug monitoring Z51.81 V58.83   3. Elevated serum creatinine R79.89 790.99   4. Generalized anxiety disorder F41.1 300.02   5. Chronic midline low back pain without sciatica M54.5 724.2    G89.29 338.29   6. Encounter for comprehensive diabetic foot examination, type 2 diabetes mellitus (CMS/[facility]) E11.9 250.00   7. Essential hypertension I10 401.9   8. Insomnia, unspecified type G47.00 780.52   9. Hyperlipidemia associated with type 2 diabetes mellitus (CMS/[facility]) E11.69 250.80    E78.5 272.4   10. Gastroesophageal reflux disease without esophagitis K21.9 530.81   11. Type " 2 diabetes mellitus with diabetic polyneuropathy, without long-term current use of insulin (CMS/[facility]) E11.42 250.60     357.2   12. Acquired hypothyroidism E03.9 244.9       Orders Placed This Encounter   Procedures   • ToxASSURE Select 13 (MW) - Urine, Clean Catch     Standing Status:   Future     Number of Occurrences:   1     Standing Expiration Date:   2/21/2020   • Comprehensive Metabolic Panel     Standing Status:   Future     Number of Occurrences:   1     Standing Expiration Date:   11/18/2019       Outpatient Encounter Medications as of 2/21/2019   Medication Sig Dispense Refill   • ALPRAZolam (XANAX) 1 MG tablet Take 1 tablet by mouth At Night As Needed for Anxiety. 30 tablet 0   • amLODIPine (NORVASC) 5 MG tablet Take 1 tablet by mouth Daily. 90 tablet 1   • clopidogrel (PLAVIX) 75 MG tablet Take 1 tablet by mouth Daily. 90 tablet 1   • docusate sodium (COLACE) 250 MG capsule Take 1 capsule by mouth Daily. 30 capsule 6   • doxepin (SINEquan) 100 MG capsule Take 1 capsule by mouth Every Night. 90 capsule 1   • ezetimibe (ZETIA) 10 MG tablet Take 1 tablet by mouth Daily. 90 tablet 1   • famotidine (PEPCID) 20 MG tablet Take 1 tablet by mouth 2 (Two) Times a Day As Needed for Heartburn. 180 tablet 1   • glimepiride (AMARYL) 1 MG tablet Take 0.5 tablets by mouth 2 (Two) Times a Day. 90 tablet 0   • glucose blood test strip To test blood glucose 1 - 2 times a day 100 each 3   • glucose monitor monitoring kit For diabetes mellitus 1 each 0   • HYDROcodone-acetaminophen (NORCO) 7.5-325 MG per tablet Take 1 tablet by mouth 2 (Two) Times a Day As Needed for Moderate Pain . 60 tablet 0   • isosorbide mononitrate (IMDUR) 30 MG 24 hr tablet Take 1 tablet by mouth Daily. 90 tablet 1   • Lancets misc To test blood glucose 1 -2 times a day 100 each 3   • levothyroxine (SYNTHROID, LEVOTHROID) 88 MCG tablet Take 1 tablet by mouth Daily. 90 tablet 0   • lisinopril (PRINIVIL,ZESTRIL) 2.5 MG tablet TAKE 1 TABLET EVERY DAY 90  tablet 1   • loratadine (CLARITIN) 10 MG tablet Take 1 tablet by mouth Daily. 30 tablet 0   • metoprolol succinate XL (TOPROL-XL) 50 MG 24 hr tablet Take 1 tablet by mouth Daily. 90 tablet 1   • nitroglycerin (NITROSTAT) 0.4 MG SL tablet Place 1 tablet under the tongue Every 5 (Five) Minutes As Needed for Chest Pain. Take no more than 3 doses in 15 minutes. 30 tablet 5   • Omega-3 Fatty Acids (FISH OIL) 1000 MG capsule capsule Take 2 capsules by mouth Daily With Breakfast. 270 capsule 1   • ranolazine (RANEXA) 500 MG 12 hr tablet Take 1 tablet by mouth Every 12 (Twelve) Hours. 180 tablet 1   • [DISCONTINUED] ALPRAZolam (XANAX) 1 MG tablet Take 1 tablet by mouth At Night As Needed for Anxiety. 30 tablet 2   • [DISCONTINUED] amLODIPine (NORVASC) 5 MG tablet TAKE 1 TABLET EVERY DAY 90 tablet 1   • [DISCONTINUED] clopidogrel (PLAVIX) 75 MG tablet TAKE 1 TABLET EVERY DAY 90 tablet 1   • [DISCONTINUED] doxepin (SINEquan) 100 MG capsule TAKE 1 CAPSULE EVERY NIGHT 90 capsule 1   • [DISCONTINUED] ezetimibe (ZETIA) 10 MG tablet Take 1 tablet by mouth Daily. 30 tablet 0   • [DISCONTINUED] famotidine (PEPCID) 20 MG tablet TAKE 1 TABLET BY MOUTH 2 (TWO) TIMES A DAY AS NEEDED FOR HEARTBURN. 180 tablet 1   • [DISCONTINUED] glimepiride (AMARYL) 1 MG tablet TAKE 1/2 TABLET TWICE DAILY 90 tablet 0   • [DISCONTINUED] guaifenesin-dextromethorphan (MUCINEX DM)  MG tablet sustained-release 12 hour tablet Take 1 tablet by mouth 2 (Two) Times a Day. 14 tablet 0   • [DISCONTINUED] HYDROcodone-acetaminophen (NORCO) 7.5-325 MG per tablet Take 1 tablet by mouth 2 (Two) Times a Day As Needed for Moderate Pain . 60 tablet 0   • [DISCONTINUED] isosorbide mononitrate (IMDUR) 30 MG 24 hr tablet TAKE 1 TABLET EVERY DAY 90 tablet 1   • [DISCONTINUED] levothyroxine (SYNTHROID, LEVOTHROID) 88 MCG tablet TAKE 1 TABLET EVERY DAY 90 tablet 1   • [DISCONTINUED] metoprolol succinate XL (TOPROL-XL) 50 MG 24 hr tablet TAKE 1 TABLET EVERY DAY 90 tablet 1      No facility-administered encounter medications on file as of 2/21/2019.        Reviewed use of high risk medication in the elderly: yes  Reviewed for potential of harmful drug interactions in the elderly: yes    Follow Up:  Return in about 4 months (around 6/21/2019), or if symptoms worsen or fail to improve.     An After Visit Summary and PPPS with all of these plans were given to the patient.

## 2019-02-27 LAB — CONV REPORT SUMMARY: NORMAL

## 2019-03-20 DIAGNOSIS — G89.29 CHRONIC MIDLINE LOW BACK PAIN WITHOUT SCIATICA: ICD-10-CM

## 2019-03-20 DIAGNOSIS — M54.50 CHRONIC MIDLINE LOW BACK PAIN WITHOUT SCIATICA: ICD-10-CM

## 2019-03-20 DIAGNOSIS — F41.1 GENERALIZED ANXIETY DISORDER: ICD-10-CM

## 2019-03-20 RX ORDER — ALPRAZOLAM 1 MG/1
1 TABLET ORAL NIGHTLY PRN
Qty: 30 TABLET | Refills: 2 | Status: SHIPPED | OUTPATIENT
Start: 2019-03-20 | End: 2019-06-12 | Stop reason: SDUPTHER

## 2019-03-20 RX ORDER — HYDROCODONE BITARTRATE AND ACETAMINOPHEN 7.5; 325 MG/1; MG/1
1 TABLET ORAL 2 TIMES DAILY PRN
Qty: 60 TABLET | Refills: 0 | Status: SHIPPED | OUTPATIENT
Start: 2019-03-20 | End: 2019-04-18 | Stop reason: SDUPTHER

## 2019-04-16 DIAGNOSIS — M54.50 CHRONIC MIDLINE LOW BACK PAIN WITHOUT SCIATICA: Primary | ICD-10-CM

## 2019-04-16 DIAGNOSIS — G89.29 CHRONIC MIDLINE LOW BACK PAIN WITHOUT SCIATICA: Primary | ICD-10-CM

## 2019-04-16 NOTE — TELEPHONE ENCOUNTER
Destinee Ordoñez Quin'Dee Marie, LPN  Phone Number: [phone]          Needs refills HYDROcodone-acetaminophen (NORCO) 7.5-325 MG to [facility] [place]. Daughter Jamilah called.

## 2019-04-18 RX ORDER — HYDROCODONE BITARTRATE AND ACETAMINOPHEN 7.5; 325 MG/1; MG/1
1 TABLET ORAL 2 TIMES DAILY PRN
Qty: 60 TABLET | Refills: 0 | Status: SHIPPED | OUTPATIENT
Start: 2019-04-18 | End: 2019-05-16 | Stop reason: SDUPTHER

## 2019-05-16 ENCOUNTER — TELEPHONE (OUTPATIENT)
Dept: FAMILY MEDICINE CLINIC | Facility: CLINIC | Age: 84
End: 2019-05-16

## 2019-05-16 DIAGNOSIS — M54.50 CHRONIC MIDLINE LOW BACK PAIN WITHOUT SCIATICA: ICD-10-CM

## 2019-05-16 DIAGNOSIS — G89.29 CHRONIC MIDLINE LOW BACK PAIN WITHOUT SCIATICA: ICD-10-CM

## 2019-05-16 RX ORDER — HYDROCODONE BITARTRATE AND ACETAMINOPHEN 7.5; 325 MG/1; MG/1
1 TABLET ORAL 2 TIMES DAILY PRN
Qty: 60 TABLET | Refills: 0 | Status: SHIPPED | OUTPATIENT
Start: 2019-05-16 | End: 2019-06-12 | Stop reason: SDUPTHER

## 2019-05-16 NOTE — TELEPHONE ENCOUNTER
----- Message from Destinee Ordoñez sent at 5/16/2019  2:12 PM CDT -----  Regarding: MED REFILL  Contact: [phone]  Needs refill on HYDROcodone-acetaminophen (NORCO) 7.5-325 MG, due tomorrow to [facility] [place]. Daughter Jamilah tran,  Yaa brady.

## 2019-05-16 NOTE — TELEPHONE ENCOUNTER
Last office visit and DONAL with Dr. Mon was on 2-21-19. Next office visit is scheduled for 6-20-19 with Dr. Mon. Last refill was on 4-.

## 2019-05-16 NOTE — TELEPHONE ENCOUNTER
----- Message from Destinee Ordoñez sent at 5/16/2019  2:12 PM CDT -----  Regarding: MED REFILL  Contact: [phone]  Needs refill on HYDROcodone-acetaminophen (NORCO) 7.5-325 MG, due tomorrow to [facility] [place]. Daughter Jamilah tran,  Yaa brady.

## 2019-05-21 DIAGNOSIS — E03.9 ACQUIRED HYPOTHYROIDISM: ICD-10-CM

## 2019-05-21 RX ORDER — LEVOTHYROXINE SODIUM 88 UG/1
TABLET ORAL
Qty: 90 TABLET | Refills: 0 | Status: SHIPPED | OUTPATIENT
Start: 2019-05-21 | End: 2019-10-07 | Stop reason: SDUPTHER

## 2019-05-28 DIAGNOSIS — E11.42 TYPE 2 DIABETES MELLITUS WITH DIABETIC POLYNEUROPATHY, WITHOUT LONG-TERM CURRENT USE OF INSULIN (HCC): ICD-10-CM

## 2019-05-29 RX ORDER — GLIMEPIRIDE 1 MG/1
TABLET ORAL
Qty: 90 TABLET | Refills: 0 | Status: SHIPPED | OUTPATIENT
Start: 2019-05-29 | End: 2019-08-26 | Stop reason: SDUPTHER

## 2019-06-04 RX ORDER — NITROGLYCERIN 0.4 MG/1
0.4 TABLET SUBLINGUAL
Qty: 25 TABLET | Refills: 3 | Status: SHIPPED | OUTPATIENT
Start: 2019-06-04 | End: 2019-06-10 | Stop reason: SDUPTHER

## 2019-06-09 DIAGNOSIS — F41.1 GENERALIZED ANXIETY DISORDER: ICD-10-CM

## 2019-06-10 ENCOUNTER — OFFICE VISIT (OUTPATIENT)
Dept: CARDIOLOGY | Facility: CLINIC | Age: 84
End: 2019-06-10

## 2019-06-10 VITALS
HEART RATE: 70 BPM | DIASTOLIC BLOOD PRESSURE: 72 MMHG | OXYGEN SATURATION: 98 % | SYSTOLIC BLOOD PRESSURE: 140 MMHG | HEIGHT: 61 IN | BODY MASS INDEX: 31.15 KG/M2 | WEIGHT: 165 LBS

## 2019-06-10 DIAGNOSIS — I20.8 CHRONIC STABLE ANGINA (HCC): ICD-10-CM

## 2019-06-10 DIAGNOSIS — I10 ESSENTIAL HYPERTENSION: ICD-10-CM

## 2019-06-10 DIAGNOSIS — I73.9 PERIPHERAL ARTERIAL DISEASE (HCC): ICD-10-CM

## 2019-06-10 DIAGNOSIS — E78.2 MIXED HYPERLIPIDEMIA: ICD-10-CM

## 2019-06-10 DIAGNOSIS — E11.9 TYPE 2 DIABETES MELLITUS WITHOUT COMPLICATION, WITHOUT LONG-TERM CURRENT USE OF INSULIN (HCC): ICD-10-CM

## 2019-06-10 DIAGNOSIS — I25.709 CORONARY ARTERY DISEASE INVOLVING CORONARY BYPASS GRAFT OF NATIVE HEART WITH ANGINA PECTORIS (HCC): Primary | ICD-10-CM

## 2019-06-10 PROCEDURE — 99214 OFFICE O/P EST MOD 30 MIN: CPT | Performed by: INTERNAL MEDICINE

## 2019-06-10 RX ORDER — NITROGLYCERIN 0.4 MG/1
TABLET SUBLINGUAL
Qty: 100 TABLET | Refills: 11 | Status: SHIPPED | OUTPATIENT
Start: 2019-06-10 | End: 2019-09-12 | Stop reason: SDUPTHER

## 2019-06-10 RX ORDER — ALPRAZOLAM 1 MG/1
TABLET ORAL
Qty: 30 TABLET | Status: CANCELLED | OUTPATIENT
Start: 2019-06-10

## 2019-06-10 RX ORDER — NITROGLYCERIN 0.4 MG/1
0.4 TABLET SUBLINGUAL
Qty: 25 TABLET | Refills: 3 | Status: SHIPPED | OUTPATIENT
Start: 2019-06-10 | End: 2019-06-20 | Stop reason: SDUPTHER

## 2019-06-10 RX ORDER — RANOLAZINE 500 MG/1
500 TABLET, EXTENDED RELEASE ORAL EVERY 12 HOURS SCHEDULED
Qty: 180 TABLET | Refills: 6 | Status: SHIPPED | OUTPATIENT
Start: 2019-06-10 | End: 2019-07-18 | Stop reason: SDUPTHER

## 2019-06-10 NOTE — PROGRESS NOTES
[facility] Cardiology  OFFICE NOTE    Tatyana Yeager  84 y.o. female    06/10/2019  1. Coronary artery disease involving 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 (CMS/HCC)    2. Peripheral arterial disease (CMS/HCC)    3. Essential hypertension    4. Mixed hyperlipidemia    5. Type 2 diabetes mellitus without complication, without long-term current use of insulin (CMS/HCC)    6. Chronic stable angina (CMS/[facility])        Chief complaint -angina    History of present [id number] lady with history of coronary disease prior CABG has been having progressive angina she has been on maximum medical therapy with Imdur and Ranexa but she has been taking only Ranexa as needed because of cost I asked her to take it at least twice a day and see if that helps and if she still continues to have chest pain then will consider invasive coronary angiogram.  She is on maximum tolerable medical therapy she is on oral agents for diabetes.  She cannot tolerate statins.  Denies any GI symptoms              Allergies   Allergen Reactions   • Prilosec [Omeprazole] Other (See Comments)     Gives her cramps in legs    • Statins Other (See Comments)     Gives patient muscle and joint pain    • Flexeril [Cyclobenzaprine] Unknown (See Comments)     Leg spasms   • Zetia [Ezetimibe] Other (See Comments)     Leg cramps         Past Medical History:   Diagnosis Date   • Acquired hypothyroidism    • Aortic valve stenosis    • Artificial lens present    • Borderline glaucoma    • Chronic depression    • Chronic pain    • Corneal foreign body     OS   • Coronary arteriosclerosis    • Cortical senile cataract    • Diabetes mellitus (CMS/HCC)     NO RETINOPATHY   • Diverticular disease of colon    • Essential hypertension    • Generalized anxiety disorder    • GERD (gastroesophageal reflux disease)    • Histoplasmosis with retinitis    • History of bone density study 03/26/2009    DXA BONE DENSITY AXIAL 49624  (Normal for the hips and lumbar region. Hips represent 3.9% improvement.Lumbar represents 1.3 % improvement)   • History of echocardiogram 09/16/2015    Echocardiogram W/ color flow 48631 (Overall LV contractility normal with Ef of 55% with LVH and diastolic relaxation abnormality of the left ventricle.Moderate to severe AV stenosis.Mild mitral regurg.No intracardiac mass or thrombus.)   • History of mammogram 03/26/2009    benign findings    • Hyperlipidemia     not able to take statin     • Joint pain    • Low back pain    • Mammogram declined 2013   • Nonexudative age-related macular degeneration     MILD   • Nuclear cataract    • Peripheral vascular disease (CMS/HCC)    • Posterior subcapsular polar senile cataract    • Primary open angle glaucoma    • Transient ischemic attack involving carotid artery     Carotid territory transient ischemic attack     • Transient visual loss    • Type 2 diabetes mellitus (CMS/HCC)          Past Surgical History:   Procedure Laterality Date   • ANGIOPLASTY AORTIC  2013    Angioplasty, aorta (dilation) (stents of bilateral common iliacarteries)   2013 EMMANUEL STATON    • CATARACT EXTRACTION  12/19/2013    Remove cataract, insert lens (Left eye.)   • CATARACT EXTRACTION  09/20/2006    Remove cataract, insert lens (nuclear cataract, right)   • CATARACT EXTRACTION Bilateral    • COLONOSCOPY  04/25/2012    Colon endoscopy 92481 (Diverticulum in sigmoid colon. Internal & external hemorrhoids found.)   • COLONOSCOPY  05/12/2008    Colon endoscopy (Rectal bleeding. Ischemic colitis (watershed region) w.bleeding w/o infarction. Internal hemorrhoids w/o bleeding)   • CORONARY ARTERY BYPASS GRAFT  01/12/2005    X2   • CRYOTHERAPY  12/12/2011    Destruction of Benign Lesion (1-14) 85870 (Actinic keratosis)    • ENDOSCOPY  04/25/2012    EGD w/ tube 01624 (Normal esophagus. Gastritis in stoamch. Biopsy taken. Normal duodenum. Biopsy taken   • ENDOSCOPY  05/12/2008    EGD with tube  (Gastroesophageal reflux w/o esophagitis. 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 w/ rhomboid flap closure)   • EXCISION LESION      Remove nose lesion (1.5 mm punch biopsy,right tip of nose)   • HYSTERECTOMY      Partial hyst (for cervical cancer)   • INJECTION OF MEDICATION  10/04/2012    Kenalog (3)   • OTHER SURGICAL HISTORY  04/23/2013    EXTENDED VISUAL FIELDS STUDY 80200 (Glaucoma, primary open angle)    • OTHER SURGICAL HISTORY  09/16/2013    OCT DISC NFL 65136 (Glaucoma, borderline)   • SPINAL FUSION      Neck spinal fusion         Family History   Problem Relation Age of Onset   • Cirrhosis Mother         LIVER   • Throat cancer Father    • Coronary artery disease Other    • Diabetes Other    • Heart disease Other    • Breast cancer Neg Hx    • Colon cancer Neg Hx    • Endometrial cancer Neg Hx          Social History     Socioeconomic History   • Marital status:      Spouse name: Not on file   • Number of children: Not on file   • Years of education: Not on file   • Highest education level: Not on file   Tobacco Use   • Smoking status: Former Smoker     Packs/day: 1.00     Years: 14.00     Pack years: 14.00   • Smokeless tobacco: Never Used   • Tobacco comment: QUIT AT AGE 35   Substance and Sexual Activity   • Alcohol use: No   • Drug use: No   • Sexual activity: Defer         Current Outpatient Medications   Medication Sig Dispense Refill   • ALPRAZolam (XANAX) 1 MG tablet Take 1 tablet by mouth At Night As Needed for Anxiety. 30 tablet 2   • amLODIPine (NORVASC) 5 MG tablet Take 1 tablet by mouth Daily. 90 tablet 1   • clopidogrel (PLAVIX) 75 MG tablet Take 1 tablet by mouth Daily. 90 tablet 1   • docusate sodium (COLACE) 250 MG capsule Take 1 capsule by mouth Daily. 30 capsule 6   • doxepin (SINEquan) 100 MG capsule  Take 1 capsule by mouth Every Night. 90 capsule 1   • famotidine (PEPCID) 20 MG tablet Take 1 tablet by mouth 2 (Two) Times a Day As Needed for Heartburn. 180 tablet 1   • glimepiride (AMARYL) 1 MG tablet TAKE 1/2 TABLET TWICE DAILY 90 tablet 0   • glucose blood test strip To test blood glucose 1 - 2 times a day 100 each 3   • glucose monitor monitoring kit For diabetes mellitus 1 each 0   • HYDROcodone-acetaminophen (NORCO) 7.5-325 MG per tablet Take 1 tablet by mouth 2 (Two) Times a Day As Needed for Moderate Pain . 60 tablet 0   • isosorbide mononitrate (IMDUR) 30 MG 24 hr tablet Take 1 tablet by mouth Daily. 90 tablet 1   • Lancets misc To test blood glucose 1 -2 times a day 100 each 3   • levothyroxine (SYNTHROID, LEVOTHROID) 88 MCG tablet TAKE 1 TABLET EVERY DAY 90 tablet 0   • lisinopril (PRINIVIL,ZESTRIL) 2.5 MG tablet TAKE 1 TABLET EVERY DAY 90 tablet 1   • metoprolol succinate XL (TOPROL-XL) 50 MG 24 hr tablet Take 1 tablet by mouth Daily. 90 tablet 1   • Omega-3 Fatty Acids (FISH OIL) 1000 MG capsule capsule Take 2 capsules by mouth Daily With Breakfast. 270 capsule 1   • ranolazine (RANEXA) 500 MG 12 hr tablet Take 1 tablet by mouth Every 12 (Twelve) Hours. 180 tablet 6   • ezetimibe (ZETIA) 10 MG tablet Take 1 tablet by mouth Daily. 90 tablet 1   • nitroglycerin (NITROSTAT) 0.4 MG SL tablet Place 1 tablet under the tongue Every 5 (Five) Minutes As Needed for Chest Pain. Take no more than 3 doses in 15 minutes. 25 tablet 3   • nitroglycerin (NITROSTAT) 0.4 MG SL tablet 1 under the tongue as needed for angina, may repeat q5mins for up three doses 100 tablet 11     No current facility-administered medications for this visit.          Review of Systems     Constitution: Denies any fatigue, fever or chills    HENT: Denies any headache, hearing impairment,     Eyes: Denies any blurring of vision, or photophobia     Cardivascular -chronic stable angina    Respiratory system-shortness of breath     Endocrine:   "history of hyperlipidemia, diabetes                       Musculoskeletal:   history of arthritis with musculoskeletal problems    Gastrointestinal: No nausea, vomiting, or melena    Genitourinary: No dysuria or hematuria    Neurological:   No history of seizure disorder, stroke, memory problems    Psychiatric/Behavioral:        No history of depression    Hematological- no history of easy bruising or any bleeding diathesis            OBJECTIVE    /72   Pulse 70   Ht 154.9 cm (60.98\")   Wt 74.8 kg (165 lb)   LMP 03/07/1971 (Within Months) Comment: Hysterectomy  SpO2 98%   BMI 31.19 kg/m²       Physical Exam     Constitutional: is oriented to person, place, and time.     Skin-warm and dry    Well developed and nourished in no acute distress      Head: Normocephalic and atraumatic.     Eyes: Pupils are equal    Neck: Neck supple. No bruit in the carotids    Cardiovascular: [place] in the fifth intercostal space   Regular rate, and  Rhythm,    S1 greater than S2,    Pulmonary/Chest:   Air  Entry is equal on both sides  No wheezing or crackles,      Abdominal: Soft.  No hepatosplenomegaly, bowel sounds are present    Musculoskeletal: No kyphoscoliosis, no significant thickening of the joints    Neurological: is alert and oriented to person, place, and time.    cranial nerve are intact .   No motor or sensory deficit    Extremities-no edema, no radial femoral delay      Psychiatric: He has a normal mood and affect.                  His behavior is normal.           Procedures      Lab Results   Component Value Date    WBC 7.08 09/04/2018    HGB 10.6 (L) 09/04/2018    HCT 30.8 (L) 09/04/2018    MCV 88.0 09/04/2018     09/04/2018     Lab Results   Component Value Date    GLUCOSE 84 02/21/2019    BUN 19 (H) 02/21/2019    CREATININE 1.21 (H) 02/21/2019    EGFRIFNONA 42 02/21/2019    BCR 15.7 02/21/2019    CO2 28.0 02/21/2019    CALCIUM 9.1 02/21/2019    ALBUMIN 4.40 02/21/2019    AST 19 02/21/2019    ALT 15 " 02/21/2019     Lab Results   Component Value Date    CHOL 324 (H) 06/06/2018     Lab Results   Component Value Date    TRIG 168 (H) 06/06/2018    TRIG 397 (H) 09/17/2015    TRIG 432 (H) 03/18/2015     Lab Results   Component Value Date    HDL 49 06/06/2018    HDL 31 (L) 09/17/2015     No components found for: LDLCALC  Lab Results   Component Value Date     06/06/2018     (H) 09/17/2015     (H) 03/18/2015     No results found for: HDLLDLRATIO  No components found for: CHOLHDL  Lab Results   Component Value Date    HGBA1C 5.7 (H) 12/14/2018     Lab Results   Component Value Date    TSH 4.310 06/06/2018        Patient's Body mass index is 31.19 kg/m². BMI is above normal parameters. Recommendations include: referral to primary care.              A/P    CAD with prior history of CABG with chronic angina, on Ranexa, Imdur she has been having more angina now but she has not been using her Ranexa regularly and she is going to use that regularly and see if that improves her angina if not better in 2 to 3 weeks to let me know then will consider coronary angiography.  She is on antiplatelet therapy with aspirin and Plavix    Diabetes on oral agents.    Hypertension on amlodipine, Imdur, Toprol-XL.      Hyperlipidemia on Zetia as she cannot tolerate any of the statins.    Arthritis she is on hydrocodone she takes one at night and sometimes 1 during daytime the pain is really bad.    Follow-up in 6 months or earlier if she has more pain then will consider cardiac catheterization        This document has been electronically signed by Alfa Whalen MD on Amanda 10, 2019 11:59 AM       EMR Dragon/Transcription disclaimer:   Some of this note may be an electronic transcription/translation of spoken language to printed text. The electronic translation of spoken language may permit erroneous, or at times, nonsensical words or phrases to be inadvertently transcribed; Although I have reviewed the note for  such errors, some may still exist.

## 2019-06-12 DIAGNOSIS — M54.50 CHRONIC MIDLINE LOW BACK PAIN WITHOUT SCIATICA: ICD-10-CM

## 2019-06-12 DIAGNOSIS — G89.29 CHRONIC MIDLINE LOW BACK PAIN WITHOUT SCIATICA: ICD-10-CM

## 2019-06-12 DIAGNOSIS — F41.1 GENERALIZED ANXIETY DISORDER: ICD-10-CM

## 2019-06-12 RX ORDER — HYDROCODONE BITARTRATE AND ACETAMINOPHEN 7.5; 325 MG/1; MG/1
1 TABLET ORAL 2 TIMES DAILY PRN
Qty: 60 TABLET | Refills: 0 | Status: SHIPPED | OUTPATIENT
Start: 2019-06-12 | End: 2019-07-11 | Stop reason: SDUPTHER

## 2019-06-12 RX ORDER — ALPRAZOLAM 1 MG/1
1 TABLET ORAL NIGHTLY PRN
Qty: 30 TABLET | Refills: 1 | Status: SHIPPED | OUTPATIENT
Start: 2019-06-12 | End: 2019-07-29 | Stop reason: SDUPTHER

## 2019-06-20 ENCOUNTER — OFFICE VISIT (OUTPATIENT)
Dept: FAMILY MEDICINE CLINIC | Facility: CLINIC | Age: 84
End: 2019-06-20

## 2019-06-20 ENCOUNTER — LAB (OUTPATIENT)
Dept: LAB | Facility: OTHER | Age: 84
End: 2019-06-20

## 2019-06-20 VITALS
HEART RATE: 80 BPM | WEIGHT: 168 LBS | DIASTOLIC BLOOD PRESSURE: 72 MMHG | SYSTOLIC BLOOD PRESSURE: 124 MMHG | HEIGHT: 61 IN | TEMPERATURE: 98.4 F | BODY MASS INDEX: 31.72 KG/M2 | OXYGEN SATURATION: 98 %

## 2019-06-20 DIAGNOSIS — Z13.0 SCREENING FOR DEFICIENCY ANEMIA: ICD-10-CM

## 2019-06-20 DIAGNOSIS — E03.9 ACQUIRED HYPOTHYROIDISM: ICD-10-CM

## 2019-06-20 DIAGNOSIS — I10 ESSENTIAL HYPERTENSION: ICD-10-CM

## 2019-06-20 DIAGNOSIS — R68.83 CHILLS: ICD-10-CM

## 2019-06-20 DIAGNOSIS — E11.42 TYPE 2 DIABETES MELLITUS WITH DIABETIC POLYNEUROPATHY, WITHOUT LONG-TERM CURRENT USE OF INSULIN (HCC): Primary | ICD-10-CM

## 2019-06-20 DIAGNOSIS — E11.42 TYPE 2 DIABETES MELLITUS WITH DIABETIC POLYNEUROPATHY, WITHOUT LONG-TERM CURRENT USE OF INSULIN (HCC): ICD-10-CM

## 2019-06-20 PROBLEM — E11.9 ENCOUNTER FOR COMPREHENSIVE DIABETIC FOOT EXAMINATION, TYPE 2 DIABETES MELLITUS (HCC): Status: RESOLVED | Noted: 2019-02-21 | Resolved: 2019-06-20

## 2019-06-20 LAB
ALBUMIN SERPL-MCNC: 4.4 G/DL (ref 3.5–5)
ALBUMIN/GLOB SERPL: 1.4 G/DL (ref 1.1–1.8)
ALP SERPL-CCNC: 71 U/L (ref 38–126)
ALT SERPL W P-5'-P-CCNC: 13 U/L
ANION GAP SERPL CALCULATED.3IONS-SCNC: 12 MMOL/L (ref 5–15)
AST SERPL-CCNC: 20 U/L (ref 14–36)
BASOPHILS # BLD AUTO: 0.03 10*3/MM3 (ref 0–0.2)
BASOPHILS NFR BLD AUTO: 0.4 % (ref 0–1.5)
BILIRUB SERPL-MCNC: 0.5 MG/DL (ref 0.2–1.3)
BUN BLD-MCNC: 25 MG/DL (ref 7–23)
BUN/CREAT SERPL: 19.4 (ref 7–25)
CALCIUM SPEC-SCNC: 10 MG/DL (ref 8.4–10.2)
CHLORIDE SERPL-SCNC: 107 MMOL/L (ref 101–112)
CO2 SERPL-SCNC: 27 MMOL/L (ref 22–30)
CREAT BLD-MCNC: 1.29 MG/DL (ref 0.52–1.04)
DEPRECATED RDW RBC AUTO: 43.1 FL (ref 37–54)
EOSINOPHIL # BLD AUTO: 0.2 10*3/MM3 (ref 0–0.4)
EOSINOPHIL NFR BLD AUTO: 2.6 % (ref 0.3–6.2)
ERYTHROCYTE [DISTWIDTH] IN BLOOD BY AUTOMATED COUNT: 13 % (ref 12.3–15.4)
GFR SERPL CREATININE-BSD FRML MDRD: 39 ML/MIN/1.73 (ref 39–90)
GLOBULIN UR ELPH-MCNC: 3.2 GM/DL (ref 2.3–3.5)
GLUCOSE BLD-MCNC: 102 MG/DL (ref 70–99)
HCT VFR BLD AUTO: 36.2 % (ref 34–46.6)
HGB BLD-MCNC: 12.2 G/DL (ref 12–15.9)
LYMPHOCYTES # BLD AUTO: 2.68 10*3/MM3 (ref 0.7–3.1)
LYMPHOCYTES NFR BLD AUTO: 34.2 % (ref 19.6–45.3)
MCH RBC QN AUTO: 31.5 PG (ref 26.6–33)
MCHC RBC AUTO-ENTMCNC: 33.7 G/DL (ref 31.5–35.7)
MCV RBC AUTO: 93.5 FL (ref 79–97)
MONOCYTES # BLD AUTO: 0.58 10*3/MM3 (ref 0.1–0.9)
MONOCYTES NFR BLD AUTO: 7.4 % (ref 5–12)
NEUTROPHILS # BLD AUTO: 4.34 10*3/MM3 (ref 1.7–7)
NEUTROPHILS NFR BLD AUTO: 55.4 % (ref 42.7–76)
PLATELET # BLD AUTO: 215 10*3/MM3 (ref 140–450)
PMV BLD AUTO: 10.9 FL (ref 6–12)
POTASSIUM BLD-SCNC: 4.7 MMOL/L (ref 3.4–5)
PROT SERPL-MCNC: 7.6 G/DL (ref 6.3–8.6)
RBC # BLD AUTO: 3.87 10*6/MM3 (ref 3.77–5.28)
SODIUM BLD-SCNC: 146 MMOL/L (ref 137–145)
WBC NRBC COR # BLD: 7.83 10*3/MM3 (ref 3.4–10.8)

## 2019-06-20 PROCEDURE — 85025 COMPLETE CBC W/AUTO DIFF WBC: CPT | Performed by: FAMILY MEDICINE

## 2019-06-20 PROCEDURE — 36415 COLL VENOUS BLD VENIPUNCTURE: CPT | Performed by: FAMILY MEDICINE

## 2019-06-20 PROCEDURE — 84443 ASSAY THYROID STIM HORMONE: CPT | Performed by: FAMILY MEDICINE

## 2019-06-20 PROCEDURE — 83036 HEMOGLOBIN GLYCOSYLATED A1C: CPT | Performed by: FAMILY MEDICINE

## 2019-06-20 PROCEDURE — 80053 COMPREHEN METABOLIC PANEL: CPT | Performed by: FAMILY MEDICINE

## 2019-06-20 PROCEDURE — 84466 ASSAY OF TRANSFERRIN: CPT | Performed by: FAMILY MEDICINE

## 2019-06-20 PROCEDURE — 84439 ASSAY OF FREE THYROXINE: CPT | Performed by: FAMILY MEDICINE

## 2019-06-20 PROCEDURE — 99214 OFFICE O/P EST MOD 30 MIN: CPT | Performed by: FAMILY MEDICINE

## 2019-06-20 PROCEDURE — 83540 ASSAY OF IRON: CPT | Performed by: FAMILY MEDICINE

## 2019-06-20 RX ORDER — NITROGLYCERIN 0.4 MG/1
0.4 TABLET SUBLINGUAL
Qty: 100 TABLET | Refills: 0 | Status: SHIPPED | OUTPATIENT
Start: 2019-06-20 | End: 2019-09-12 | Stop reason: SDUPTHER

## 2019-06-20 NOTE — PROGRESS NOTES
"Subjective   Chief Complaint   Patient presents with   • Diabetes     Tatyana Yeager is a 84 y.o. female.   Diabetes    History of Present Illness     Diabetes - currently managed with amaryl  Denies polyphagia, polydipsia, and polyuria  She was discontinued glipizide due to elevated creatinine and was recommended that she follow a diabetic diet for management  Diabetic foot exam is current  Diabetic eye exam is current  Lipid panel is due today    C/o intermittent chills  She spoke with Dr Grimm about this and he suggested she get lab work done    C/o fatigue    Asking for medication refills to her mail order pharmacy    Asking for a 3 month supply of her nitroglycerin to be sent to mail order    The following portions of the patient's history were reviewed and updated as appropriate: allergies, current medications, past family history, past medical history, past social history, past surgical history and problem list.    Review of Systems   Constitutional: Positive for chills. Negative for appetite change, fatigue and fever.   HENT: Negative for congestion, ear pain, rhinorrhea and sore throat.    Eyes: Negative for pain.   Respiratory: Negative for cough and shortness of breath.    Cardiovascular: Negative for chest pain and palpitations.   Gastrointestinal: Negative for abdominal pain, constipation, diarrhea and nausea.   Genitourinary: Negative for dysuria.   Musculoskeletal: Negative for back pain, joint swelling and neck pain.   Skin: Negative for rash.   Neurological: Negative for dizziness and headaches.       Objective   /72   Pulse 80   Temp 98.4 °F (36.9 °C) (Oral)   Ht 154.9 cm (60.98\")   Wt 76.2 kg (168 lb)   LMP 03/07/1971 (Within Months) Comment: Hysterectomy  SpO2 98%   BMI 31.76 kg/m²   Physical Exam   Constitutional: She is oriented to person, place, and time. She appears well-developed and well-nourished.   HENT:   Head: Normocephalic and atraumatic.   Eyes: Pupils are equal, " round, and reactive to light.   Neck: Normal range of motion. Neck supple.   Cardiovascular: Normal rate, regular rhythm and normal heart sounds.   Pulmonary/Chest: Effort normal and breath sounds normal. No respiratory distress. She has no wheezes. She has no rales.   Abdominal: Soft. Bowel sounds are normal.   Musculoskeletal: Normal range of motion.   Neurological: She is alert and oriented to person, place, and time.   Skin: Skin is warm and dry.   Psychiatric: She has a normal mood and affect.   Nursing note and vitals reviewed.      Assessment/Plan   Problems Addressed this Visit        Cardiovascular and Mediastinum    Essential hypertension       Endocrine    Type 2 diabetes mellitus with diabetic polyneuropathy (CMS/[facility]) - Primary    Relevant Orders    CBC & Differential (Completed)    Comprehensive Metabolic Panel (Completed)    Hemoglobin A1c    Acquired hypothyroidism    Relevant Orders    TSH    T4, free      Other Visit Diagnoses     Chills        Screening for deficiency anemia        Relevant Orders    Iron Profile        The patient has read and signed the [facility] Controlled Substance Contract.  I will continue to see patient for regular follow up appointments.  They are well controlled on their medication.  DONAL is updated every 3 months. The patient is aware of the potential for addiction and dependence.  donal reviewed [id number]  norco and xanax was refilled 6/15/19  Patient to call when due for controlled medications    Patient's Body mass index is 31.76 kg/m². BMI is above normal parameters. Recommendations include: exercise counseling and nutrition counseling.    Labs ordered    Scripts sent to mail order    Recheck as needed  Referred to Dr Bustillos

## 2019-06-21 LAB
HBA1C MFR BLD: 5.54 % (ref 4.8–5.6)
IRON 24H UR-MRATE: 109 MCG/DL (ref 37–145)
IRON SATN MFR SERPL: 39 % (ref 20–50)
T4 FREE SERPL-MCNC: 1.44 NG/DL (ref 0.93–1.7)
TIBC SERPL-MCNC: 283 MCG/DL (ref 298–536)
TRANSFERRIN SERPL-MCNC: 190 MG/DL (ref 200–360)
TSH SERPL DL<=0.05 MIU/L-ACNC: 1.92 MIU/ML (ref 0.27–4.2)

## 2019-07-11 DIAGNOSIS — M54.50 CHRONIC MIDLINE LOW BACK PAIN WITHOUT SCIATICA: ICD-10-CM

## 2019-07-11 DIAGNOSIS — G89.29 CHRONIC MIDLINE LOW BACK PAIN WITHOUT SCIATICA: ICD-10-CM

## 2019-07-11 RX ORDER — HYDROCODONE BITARTRATE AND ACETAMINOPHEN 7.5; 325 MG/1; MG/1
1 TABLET ORAL 2 TIMES DAILY PRN
Qty: 60 TABLET | Refills: 0 | Status: SHIPPED | OUTPATIENT
Start: 2019-07-11 | End: 2019-07-29 | Stop reason: SDUPTHER

## 2019-07-18 DIAGNOSIS — I20.8 CHRONIC STABLE ANGINA (HCC): ICD-10-CM

## 2019-07-22 RX ORDER — RANOLAZINE 500 MG/1
500 TABLET, EXTENDED RELEASE ORAL EVERY 12 HOURS SCHEDULED
Qty: 180 TABLET | Refills: 0 | Status: SHIPPED | OUTPATIENT
Start: 2019-07-22 | End: 2019-10-07 | Stop reason: SDUPTHER

## 2019-07-29 ENCOUNTER — OFFICE VISIT (OUTPATIENT)
Dept: FAMILY MEDICINE CLINIC | Facility: CLINIC | Age: 84
End: 2019-07-29

## 2019-07-29 VITALS
TEMPERATURE: 97.6 F | SYSTOLIC BLOOD PRESSURE: 124 MMHG | DIASTOLIC BLOOD PRESSURE: 58 MMHG | BODY MASS INDEX: 32.39 KG/M2 | WEIGHT: 165 LBS | HEIGHT: 60 IN | OXYGEN SATURATION: 99 % | HEART RATE: 78 BPM

## 2019-07-29 DIAGNOSIS — Z79.891 LONG TERM (CURRENT) USE OF OPIATE ANALGESIC: ICD-10-CM

## 2019-07-29 DIAGNOSIS — Z79.899 LONG TERM PRESCRIPTION BENZODIAZEPINE USE: ICD-10-CM

## 2019-07-29 DIAGNOSIS — L30.1 ECZEMA, DYSHIDROTIC: Primary | Chronic | ICD-10-CM

## 2019-07-29 DIAGNOSIS — E61.1 IRON DEFICIENCY: ICD-10-CM

## 2019-07-29 DIAGNOSIS — I20.8 CHRONIC STABLE ANGINA (HCC): ICD-10-CM

## 2019-07-29 DIAGNOSIS — G89.29 CHRONIC MIDLINE LOW BACK PAIN WITHOUT SCIATICA: ICD-10-CM

## 2019-07-29 DIAGNOSIS — F41.1 GENERALIZED ANXIETY DISORDER: ICD-10-CM

## 2019-07-29 DIAGNOSIS — I73.9 PERIPHERAL ARTERIAL DISEASE (HCC): Chronic | ICD-10-CM

## 2019-07-29 DIAGNOSIS — E78.2 MIXED HYPERLIPIDEMIA: ICD-10-CM

## 2019-07-29 DIAGNOSIS — I25.118 CORONARY ARTERY DISEASE OF NATIVE ARTERY OF NATIVE HEART WITH STABLE ANGINA PECTORIS (HCC): Chronic | ICD-10-CM

## 2019-07-29 DIAGNOSIS — M54.50 CHRONIC MIDLINE LOW BACK PAIN WITHOUT SCIATICA: ICD-10-CM

## 2019-07-29 PROCEDURE — 99214 OFFICE O/P EST MOD 30 MIN: CPT | Performed by: NURSE PRACTITIONER

## 2019-07-29 RX ORDER — BETAMETHASONE DIPROPIONATE 0.5 MG/G
CREAM TOPICAL 2 TIMES DAILY
Qty: 45 G | Refills: 2 | Status: SHIPPED | OUTPATIENT
Start: 2019-07-29 | End: 2019-10-07

## 2019-07-29 RX ORDER — ALPRAZOLAM 1 MG/1
1 TABLET ORAL NIGHTLY PRN
Qty: 30 TABLET | Refills: 1 | Status: SHIPPED | OUTPATIENT
Start: 2019-07-29 | End: 2019-09-28 | Stop reason: SDUPTHER

## 2019-07-29 RX ORDER — HYDROCODONE BITARTRATE AND ACETAMINOPHEN 7.5; 325 MG/1; MG/1
1 TABLET ORAL 2 TIMES DAILY PRN
Qty: 60 TABLET | Refills: 0 | Status: SHIPPED | OUTPATIENT
Start: 2019-07-29 | End: 2019-09-09 | Stop reason: SDUPTHER

## 2019-07-29 NOTE — PROGRESS NOTES
"Chief Complaint   Patient presents with   • Establish Care     paz patient, patient states she has small bumps that are itchy      Subjective   Tatyana Yeager is a 84 y.o. female who presents to the office to establish care. Also presents with multiple problems today.     The following portions of the patient's history were reviewed and updated as appropriate: allergies, current medications, past family history, past medical history, past social history, past surgical history and problem list.    History of Present Illness   PMH: CAD w/ hx CABD and angina.HTN, Mixed hyperlipidemia, PAD, DM2 w/o insulin use. Hypothyroidism, glaucoma, depression, chronic lumbar pain, BRITTANEY, GERD, macular degeneration, hx TIAs.     Last UDS 2/21/19 appropriate  Fasting labs ( no lipid)  6/20/19  Iron profile  Transferrin 190  TIBC 283 (started on MVI with iron at that time)  CBC normal  CMP gluc 102, BUN 25 creat 1.29  Na 146  T4, TSH normal  A1C normal  Last lipid: 6/6/18  tchol 324  Trig 168   New problem: Today presents with a small, semifirm vessicle approx 0.2cm circular right palmar surface of hand, states recurrent isolated lesions as this appear off and on for several months now, are very pruritic, never red and no other symptoms associated with these. There is some flaky dry skin near this one.     PAD: Also complains of cold sensation of bilateral legs, feels like \"Ice water circulating in them from the knee down\" since September 2018 or so, since returning home from hospitalization due to pneumonia.  She has a hx of PAD and stenting of \"leg\" with hx of bilateral stenting of common iliacs noted in 2013. Her pulses are somewhat weak and easily obliterated bilateral feet. Color is normal. Reports tops of her feet also feel cold. Will refer to cardiology as she may need repeated US of arteries. She is on Plavix and nitrates routinely.     CAD of native heart, A/P CABD x 2 2005, with recurrent angina. Denies current angina. " Current patient Dr Alfa Joseph, managed on amlodipine, Plavix, Imdur, lisionpril, Toprol XL, Ranexa, and PRN NTG. Referral to Dr Palma re: PAD and CAD, HTN, controlled.      chronic lower back pain:;Chronic mildline lumbosacral pain, no sciatica. MRI 7/1/2016:  Mild to moderate DDD L3-S1, small disc protrusions. Mild to mod faraminal narrowing bilaterally L5-S1, milder at L4-5 and left >R at that level. No more recent imaging is seen.  She is a poor candidate for NSAIDS due to CAD/ PAD and Plavix use. Managed well with hydrocodone, due for refill today.   Chronic right shoulder pain: arthritic changes, chronic pain. Managed well with hydrocodone, due for refill today.  BRITTANEY:  Has been managed with Xanax for 38 years, along with Doxepin. Tried and failed multiple antianxiety/ antidepressant meds.  Due for refill today.   Hyperlipidemia: elevated but intolerant of Zetia, statins. Due for labs.  Intolerant of statins, self lowered her fish oil thinking that was thinning her blood too much causing chilled lower legs. Not likely she would comply with Vascepa either. Will not consider resuming statin.   DM2, managed to normal A1C with glimepiride  GERD: managed with famotidine  Depression: denies.   Past Surgical History:   Procedure Laterality Date   • ANGIOPLASTY AORTIC  2013    Angioplasty, aorta (dilation) (stents of bilateral common iliacarteries)   2013 EMMANUEL STATON    • CATARACT EXTRACTION  12/19/2013    Remove cataract, insert lens (Left eye.)   • CATARACT EXTRACTION  09/20/2006    Remove cataract, insert lens (nuclear cataract, right)   • CATARACT EXTRACTION Bilateral    • COLONOSCOPY  04/25/2012    Colon endoscopy 00945 (Diverticulum in sigmoid colon. Internal & external hemorrhoids found.)   • COLONOSCOPY  05/12/2008    Colon endoscopy (Rectal bleeding. Ischemic colitis (watershed region) w.bleeding w/o infarction. Internal hemorrhoids w/o bleeding)   • CORONARY ARTERY BYPASS GRAFT  01/12/2005    X2   •  CRYOTHERAPY  12/12/2011    Destruction of Benign Lesion (1-14) 18134 (Actinic keratosis)    • ENDOSCOPY  04/25/2012    EGD w/ tube 31167 (Normal esophagus. Gastritis in stoamch. Biopsy taken. Normal duodenum. Biopsy taken   • ENDOSCOPY  05/12/2008    EGD with tube (Gastroesophageal reflux w/o esophagitis. 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 w/ rhomboid flap closure)   • EXCISION LESION      Remove nose lesion (1.5 mm punch biopsy,right tip of nose)   • HYSTERECTOMY      Partial hyst (for cervical cancer)   • INJECTION OF MEDICATION  10/04/2012    Kenalog (3)   • OTHER SURGICAL HISTORY  04/23/2013    EXTENDED VISUAL FIELDS STUDY 25254 (Glaucoma, primary open angle)    • OTHER SURGICAL HISTORY  09/16/2013    OCT DISC NFL 93793 (Glaucoma, borderline)   • SPINAL FUSION      Neck spinal fusion     Past Medical History:   Diagnosis Date   • Acquired hypothyroidism    • Aortic valve stenosis    • Artificial lens present    • Borderline glaucoma    • Chronic depression    • Chronic pain    • Corneal foreign body     OS   • Coronary arteriosclerosis    • Cortical senile cataract    • Diabetes mellitus (CMS/HCC)     NO RETINOPATHY   • Diverticular disease of colon    • Essential hypertension    • Generalized anxiety disorder    • GERD (gastroesophageal reflux disease)    • Histoplasmosis with retinitis    • History of bone density study 03/26/2009    DXA BONE DENSITY AXIAL 77742 (Normal for the hips and lumbar region. Hips represent 3.9% improvement.Lumbar represents 1.3 % improvement)   • History of echocardiogram 09/16/2015    Echocardiogram W/ color flow 31844 (Overall LV contractility normal with Ef of 55% with LVH and diastolic relaxation abnormality of the left ventricle.Moderate to severe AV stenosis.Mild mitral regurg.No intracardiac mass  or thrombus.)   • History of mammogram 03/26/2009    benign findings    • Hyperlipidemia     not able to take statin     • Joint pain    • Low back pain    • Mammogram declined 2013   • Nonexudative age-related macular degeneration     MILD   • Nuclear cataract    • Peripheral vascular disease (CMS/HCC)    • Posterior subcapsular polar senile cataract    • Primary open angle glaucoma    • Transient ischemic attack involving carotid artery     Carotid territory transient ischemic attack     • Transient visual loss    • Type 2 diabetes mellitus (CMS/HCC)           Family History   Problem Relation Age of Onset   • Cirrhosis Mother         LIVER   • Throat cancer Father    • Coronary artery disease Other    • Diabetes Other    • Heart disease Other    • Breast cancer Neg Hx    • Colon cancer Neg Hx    • Endometrial cancer Neg Hx         Review of Systems   Constitutional: Positive for chills (of bilateral LEs and tops of feet.). Negative for appetite change, fatigue and fever.   HENT: Negative for congestion, ear pain, rhinorrhea and sore throat.    Eyes: Negative for pain.   Respiratory: Negative for cough and shortness of breath.    Cardiovascular: Positive for leg swelling (bilateral ankles intermittently not today). Negative for chest pain and palpitations.   Gastrointestinal: Negative for abdominal pain, constipation, diarrhea and nausea.   Genitourinary: Negative for dysuria.   Musculoskeletal: Positive for arthralgias (right shoulder, chronic) and back pain (chronic lower lumbar, midline, no sciatic). Negative for joint swelling and neck pain.   Skin: Negative for rash.        See HPI about vessicle right hand palmar surface   Neurological: Negative for dizziness and headaches.   Psychiatric/Behavioral: Negative for dysphoric mood, sleep disturbance and suicidal ideas. The patient is not nervous/anxious.    All other systems reviewed and are negative.      Objective   Vitals:    07/29/19 1110   BP: 124/58  "  Pulse: 78   Temp: 97.6 °F (36.4 °C)   TempSrc: Temporal   SpO2: 99%   Weight: 74.8 kg (165 lb)   Height: 152.4 cm (60\")   PainSc: 0-No pain     Physical Exam   Constitutional: She is oriented to person, place, and time. She appears well-developed and well-nourished.   HENT:   Head: Normocephalic and atraumatic.   Eyes: Conjunctivae and EOM are normal. Pupils are equal, round, and reactive to light.   Neck: Normal range of motion. Neck supple.   Cardiovascular: Normal rate, regular rhythm, normal heart sounds and intact distal pulses. Exam reveals no gallop and no friction rub.   No murmur heard.  Pulses of DPP, PTP bilateral are fluctuating 1+-2+ during exam with easily obliterated pulses with gentle pressure, cap refill feet 2-3 sec, warm to touch equally.    Pulmonary/Chest: Effort normal and breath sounds normal. No respiratory distress. She has no wheezes. She has no rales. She exhibits no tenderness.   Abdominal: Soft. Bowel sounds are normal.   Musculoskeletal: Normal range of motion. She exhibits no edema or deformity.        Right shoulder: She exhibits tenderness, bony tenderness and pain.        Lumbar back: She exhibits tenderness and pain.        Back:         Arms:  Lymphadenopathy:     She has no cervical adenopathy.   Neurological: She is alert and oriented to person, place, and time.   Skin: Skin is warm and dry. Capillary refill takes 2 to 3 seconds. No erythema. No pallor.   See HPI re: palmar surface vessicle right hand.   Psychiatric: She has a normal mood and affect. Her behavior is normal. Judgment and thought content normal.   Nursing note and vitals reviewed.      Assessment/Plan   Tatyana was seen today for establish care.    Diagnoses and all orders for this visit:    Eczema, dyshidrotic  Comments:  tiny blisters sporadically appearing on hands, torso, limbs, pruritic, onset several months ago. Rx for topical steroid today  Orders:  -     betamethasone dipropionate (DIPROLENE) 0.05 % cream; " "Apply  topically to the appropriate area as directed 2 (Two) Times a Day. Until lesions subside, repeat for recurrence    Chronic stable angina (CMS/[facility])  -     Ambulatory Referral to Cardiology    Peripheral arterial disease (CMS/[facility])  Comments:  pulses weak bilateral DPP, PTP, easily obliterated, cold feeling subjectively like ice in veins x 9 months or so. reports stents of \"both legs\" pointing@groin.   Orders:  -     Ambulatory Referral to Cardiology    Coronary artery disease of native artery of native heart with stable angina pectoris (CMS/[facility])  Comments:  frequent chest pain is her norm, is on nitrates long term, Needs referral to Dr Palma as Dr Alfa Joseph is leaving in August.     Chronic midline low back pain without sciatica  -     HYDROcodone-acetaminophen (NORCO) 7.5-325 MG per tablet; Take 1 tablet by mouth 2 (Two) Times a Day As Needed for Moderate Pain . Fill when due    Generalized anxiety disorder  -     ALPRAZolam (XANAX) 1 MG tablet; Take 1 tablet by mouth At Night As Needed for Anxiety. Fill when due    Mixed hyperlipidemia  -     Lipid Panel    Long term prescription benzodiazepine use  -     ToxASSURE Select 13 Discrete -    Long term (current) use of opiate analgesic  -     ToxASSURE Select 13 Discrete -    Iron deficiency  -     Iron Profile; Future           PHQ-2/PHQ-9 Depression Screening 2/21/2019   Little interest or pleasure in doing things 0   Feeling down, depressed, or hopeless 0   Trouble falling or staying asleep, or sleeping too much -   Feeling tired or having little energy -   Poor appetite or overeating -   Feeling bad about yourself - or that you are a failure or have let yourself or your family down -   Trouble concentrating on things, such as reading the newspaper or watching television -   Moving or speaking so slowly that other people could have noticed. Or the opposite - being so fidgety or restless that you have been moving around a lot more than usual - "   Thoughts that you would be better off dead, or of hurting yourself in some way -   Total Score 0   If you checked off any problems, how difficult have these problems made it for you to do your work, take care of things at home, or get along with other people? -   Patient understands the risks associated with this controlled medication, including tolerance and addiction.  Patient also agrees to only obtain this medication from me, and not from a another provider, unless that provider is covering for me in my absence.  Patient also agrees to be compliant in dosing, and not self adjust the dose of medication.  A signed controlled substance agreement is on file, and the patient has received a controlled substance education sheet at this a previous visit.  The patient has also signed a consent for treatment with a controlled substance as per [facility] policy. DONAL was obtained.      STEVE Abdi         Return in about 4 weeks (around 8/26/2019).    There are no Patient Instructions on file for this visit.

## 2019-08-01 RX ORDER — LISINOPRIL 2.5 MG/1
TABLET ORAL
Qty: 90 TABLET | Refills: 1 | OUTPATIENT
Start: 2019-08-01

## 2019-08-14 LAB
CHOLEST SERPL-MCNC: 323 MG/DL (ref 150–200)
HDLC SERPL-MCNC: 38 MG/DL (ref 40–59)
LDLC SERPL CALC-MCNC: 235 MG/DL
LDLC/HDLC SERPL: 6.19 {RATIO} (ref 0–3.22)
TRIGL SERPL-MCNC: 249 MG/DL
VLDLC SERPL-MCNC: 49.8 MG/DL

## 2019-08-14 PROCEDURE — 80061 LIPID PANEL: CPT | Performed by: NURSE PRACTITIONER

## 2019-08-14 PROCEDURE — 80307 DRUG TEST PRSMV CHEM ANLYZR: CPT | Performed by: NURSE PRACTITIONER

## 2019-08-14 PROCEDURE — 36415 COLL VENOUS BLD VENIPUNCTURE: CPT | Performed by: NURSE PRACTITIONER

## 2019-08-14 PROCEDURE — G0481 DRUG TEST DEF 8-14 CLASSES: HCPCS | Performed by: NURSE PRACTITIONER

## 2019-08-20 RX ORDER — LISINOPRIL 2.5 MG/1
2.5 TABLET ORAL DAILY
Qty: 90 TABLET | Refills: 1 | Status: SHIPPED | OUTPATIENT
Start: 2019-08-20 | End: 2019-10-07 | Stop reason: SDUPTHER

## 2019-08-21 DIAGNOSIS — I25.709 CORONARY ARTERY DISEASE INVOLVING CORONARY BYPASS GRAFT OF NATIVE HEART WITH ANGINA PECTORIS (HCC): Primary | ICD-10-CM

## 2019-08-21 LAB
6-ACETYLMORPHINE: NEGATIVE
6MAM SERPLBLD-MCNC: NOT DETECTED NG/MG CREAT
7-AMINOCLONAZEPAM UR: NOT DETECTED NG/MG CREAT
A-OH ALPRAZ/CREAT UR: 385 NG/MG CREAT
ALFENTANIL UR QL: NOT DETECTED NG/MG CREAT
ALPHA-HYDROXYMIDAZOLAM, URINE: NOT DETECTED NG/MG CREAT
ALPHA-HYDROXYTRIAZOLAM, URINE: NOT DETECTED NG/MG CREAT
AMOBARBITAL UR: NOT DETECTED
AMPHET UR QL CFM: NOT DETECTED NG/MG CREAT
AMPHETAMINES UR QL SCN: NEGATIVE
BARBITAL UR QL CFM: NOT DETECTED
BARBITURATES UR QL SCN: NEGATIVE
BENZODIAZ UR QL SCN: NORMAL
BENZOYLECGONINE UR: NOT DETECTED NG/MG CREAT
BUPRENORPHINE UR QL: NEGATIVE
BUPRENORPHINE UR QL: NOT DETECTED NG/MG CREAT
BUTABARBITAL UR QL: NOT DETECTED
BUTALBITAL UR QL: NOT DETECTED
CANNABINOIDS UR QL CFM: NEGATIVE
CLONAZEPAM UR QL: NOT DETECTED NG/MG CREAT
COCAETHYLENE UR QL CFM: NOT DETECTED NG/MG CREAT
COCAINE UR QL CFM: NEGATIVE
CODEINE UR QL: NOT DETECTED NG/MG CREAT
CONV COCAINE, UR: NOT DETECTED NG/MG CREAT
CONV REPORT SUMMARY: NORMAL
CREAT 24H UR-MCNC: 79 MG/DL
DESALKYLFLURAZ/CREAT UR: NOT DETECTED NG/MG CREAT
DESMETHYLFLUNITRAZEPAM: NOT DETECTED NG/MG CREAT
DIAZEPAM UR-MCNC: NOT DETECTED NG/MG CREAT
DIHYDROCODEINE UR: NOT DETECTED NG/MG CREAT
EDDP SERPL QL: NOT DETECTED NG/MG CREAT
ETHANOL SCREEN URINE (REF): NEGATIVE
ETHANOL UR-MCNC: NOT DETECTED G/DL
FENTANYL UR QL: NEGATIVE
FENTANYL+NORFENTANYL UR QL SCN: NOT DETECTED NG/MG CREAT
FLUNITRAZEPAM SERPLBLD-MCNC: NOT DETECTED NG/MG CREAT
HYDROCODONE UR QL: NOT DETECTED NG/MG CREAT
HYDROMORPHONE UR QL: 125 NG/MG CREAT
LEVEL OF DETECTION:: NORMAL
LORAZEPAM UR-MCNC: NOT DETECTED NG/MG CREAT
LORAZEPAM/CREAT UR: 105 NG/MG CREAT
MDA SERPLBLD-MCNC: NOT DETECTED NG/MG CREAT
MDMA UR QL SCN: NOT DETECTED NG/MG CREAT
MEPHOBARBITAL UR QL CFM: NOT DETECTED
METHADONE BLD QL SCN: NEGATIVE
METHADONE, URINE: NOT DETECTED NG/MG CREAT
METHAMPHETAMINE UR: NOT DETECTED NG/MG CREAT
MIDAZOLAM UR-MCNC: NOT DETECTED NG/MG CREAT
MORPHINE UR QL: NOT DETECTED NG/MG CREAT
N-DESMETHYLTRAMADOL, U: NOT DETECTED NG/MG CREAT
NARCOTICS UR: NEGATIVE
NORBUPRENORPHINE SERPLBLD-MCNC: NOT DETECTED NG/MG CREAT
NORCODEINE UR-MCNC: NOT DETECTED NG/MG CREAT
NORDIAZEPAM SERPL CFM-MCNC: NOT DETECTED NG/MG CREAT
NORFENTANYL UR: NOT DETECTED NG/MG CREAT
NORMORPHINE: NOT DETECTED NG/MG CREAT
NOROXYMORPHONE: NOT DETECTED NG/MG CREAT
O-DESMETHYLTRAMADOL, UR: NOT DETECTED NG/MG CREAT
OPIATES UR QL CFM: NORMAL
OPIATES, URINE, NORHYDROCODONE: 242 NG/MG CREAT
OPIATES, URINE, NOROXYCODONE: NOT DETECTED NG/MG CREAT
OXAZEPAM UR QL: NOT DETECTED NG/MG CREAT
OXYCODONE UR QL: NEGATIVE
OXYCODONE UR: NOT DETECTED NG/MG CREAT
OXYMORPHONE UR: NOT DETECTED NG/MG CREAT
PENTOBARBITAL UR: NOT DETECTED
PHENOBARB UR QL: NOT DETECTED
SECOBARBITAL UR QL: NOT DETECTED
SUFENTANIL UR QL: NOT DETECTED NG/MG CREAT
TAPENTADOL SERPLBLD-MCNC: NEGATIVE NG/ML
TAPENTADOL UR-MCNC: NOT DETECTED NG/MG CREAT
TEMAZEPAM UR QL CFM: NOT DETECTED NG/MG CREAT
THC UR CFM-MCNC: NOT DETECTED NG/MG CREAT
THIOPENTAL UR QL CFM: NOT DETECTED
TRAMADOL UR: NOT DETECTED NG/MG CREAT

## 2019-08-23 ENCOUNTER — TELEPHONE (OUTPATIENT)
Dept: FAMILY MEDICINE CLINIC | Facility: CLINIC | Age: 84
End: 2019-08-23

## 2019-08-23 RX ORDER — CHOLESTYRAMINE LIGHT 4 G/5.7G
4 POWDER, FOR SUSPENSION ORAL 2 TIMES DAILY
Qty: 60 PACKET | Refills: 3 | Status: SHIPPED | OUTPATIENT
Start: 2019-08-23 | End: 2019-10-07

## 2019-08-23 RX ORDER — EZETIMIBE 10 MG/1
10 TABLET ORAL DAILY
Qty: 30 TABLET | Refills: 3 | Status: SHIPPED | OUTPATIENT
Start: 2019-08-23 | End: 2019-09-12 | Stop reason: SINTOL

## 2019-08-23 NOTE — TELEPHONE ENCOUNTER
----- Message from STEVE Queen sent at 8/23/2019  8:29 AM CDT -----  Regarding: RE: Labs  Advise patient I have sent prescription for 2 meds that block cholesterol absorption in the gut. eztimibe and cholestyramine. They would only cause gastrointestinal symptoms If any. Please to try them soon and continue until seen. Thanks clw.    ----- Message -----  From: Liana Mancia  Sent: 8/19/2019   1:58 PM  To: STEVE Queen  Subject: Labs                                             I made an earlier appt for Mrs Yeager due to lab results like you asked, she called and said she know her cholesterol is high she said she can't take med for it so she just wants to wait until her Oct appt to see you. I told her I would tell you and ask you what you thought

## 2019-08-26 DIAGNOSIS — E11.42 TYPE 2 DIABETES MELLITUS WITH DIABETIC POLYNEUROPATHY, WITHOUT LONG-TERM CURRENT USE OF INSULIN (HCC): ICD-10-CM

## 2019-08-26 RX ORDER — GLIMEPIRIDE 1 MG/1
1 TABLET ORAL 2 TIMES DAILY
Qty: 180 TABLET | Refills: 0 | Status: SHIPPED | OUTPATIENT
Start: 2019-08-26 | End: 2019-10-07 | Stop reason: SDUPTHER

## 2019-08-29 DIAGNOSIS — I10 ESSENTIAL HYPERTENSION: ICD-10-CM

## 2019-08-29 DIAGNOSIS — K21.9 GASTROESOPHAGEAL REFLUX DISEASE WITHOUT ESOPHAGITIS: ICD-10-CM

## 2019-08-29 RX ORDER — FAMOTIDINE 20 MG/1
20 TABLET, FILM COATED ORAL 2 TIMES DAILY PRN
Qty: 180 TABLET | Refills: 1 | Status: SHIPPED | OUTPATIENT
Start: 2019-08-29 | End: 2019-10-07 | Stop reason: SDUPTHER

## 2019-08-29 RX ORDER — ISOSORBIDE MONONITRATE 30 MG/1
30 TABLET, EXTENDED RELEASE ORAL DAILY
Qty: 90 TABLET | Refills: 1 | Status: SHIPPED | OUTPATIENT
Start: 2019-08-29 | End: 2019-09-12

## 2019-08-29 RX ORDER — METOPROLOL SUCCINATE 50 MG/1
50 TABLET, EXTENDED RELEASE ORAL DAILY
Qty: 90 TABLET | Refills: 1 | Status: SHIPPED | OUTPATIENT
Start: 2019-08-29 | End: 2019-10-07 | Stop reason: SDUPTHER

## 2019-09-09 DIAGNOSIS — G89.29 CHRONIC MIDLINE LOW BACK PAIN WITHOUT SCIATICA: ICD-10-CM

## 2019-09-09 DIAGNOSIS — M54.50 CHRONIC MIDLINE LOW BACK PAIN WITHOUT SCIATICA: ICD-10-CM

## 2019-09-09 RX ORDER — HYDROCODONE BITARTRATE AND ACETAMINOPHEN 7.5; 325 MG/1; MG/1
1 TABLET ORAL 2 TIMES DAILY PRN
Qty: 60 TABLET | Refills: 0 | Status: SHIPPED | OUTPATIENT
Start: 2019-09-09 | End: 2019-10-07 | Stop reason: SDUPTHER

## 2019-09-11 DIAGNOSIS — I25.709 CORONARY ARTERY DISEASE INVOLVING CORONARY BYPASS GRAFT OF NATIVE HEART WITH ANGINA PECTORIS (HCC): Primary | ICD-10-CM

## 2019-09-12 ENCOUNTER — OFFICE VISIT (OUTPATIENT)
Dept: CARDIOLOGY | Facility: CLINIC | Age: 84
End: 2019-09-12

## 2019-09-12 VITALS
WEIGHT: 164 LBS | OXYGEN SATURATION: 98 % | HEART RATE: 70 BPM | SYSTOLIC BLOOD PRESSURE: 130 MMHG | DIASTOLIC BLOOD PRESSURE: 60 MMHG | HEIGHT: 62 IN | BODY MASS INDEX: 30.18 KG/M2

## 2019-09-12 DIAGNOSIS — R60.1 GENERALIZED EDEMA: ICD-10-CM

## 2019-09-12 DIAGNOSIS — I20.9 ANGINA PECTORIS (HCC): Primary | ICD-10-CM

## 2019-09-12 DIAGNOSIS — R01.1 CARDIAC MURMUR: ICD-10-CM

## 2019-09-12 DIAGNOSIS — I73.9 CLAUDICATION (HCC): ICD-10-CM

## 2019-09-12 DIAGNOSIS — I83.93 VARICOSE VEINS OF BOTH LOWER EXTREMITIES, UNSPECIFIED WHETHER COMPLICATED: ICD-10-CM

## 2019-09-12 PROCEDURE — 99214 OFFICE O/P EST MOD 30 MIN: CPT | Performed by: INTERNAL MEDICINE

## 2019-09-12 RX ORDER — NITROGLYCERIN 0.4 MG/1
TABLET SUBLINGUAL
Qty: 25 TABLET | Refills: 6 | Status: SHIPPED | OUTPATIENT
Start: 2019-09-12 | End: 2019-09-12

## 2019-09-12 RX ORDER — NITROGLYCERIN 0.3 MG/1
TABLET SUBLINGUAL
Qty: 100 TABLET | Refills: 11 | Status: SHIPPED | OUTPATIENT
Start: 2019-09-12 | End: 2019-10-07 | Stop reason: SDUPTHER

## 2019-09-12 RX ORDER — ISOSORBIDE MONONITRATE 60 MG/1
60 TABLET, EXTENDED RELEASE ORAL EVERY MORNING
Qty: 30 TABLET | Refills: 11 | Status: SHIPPED | OUTPATIENT
Start: 2019-09-12 | End: 2019-10-07 | Stop reason: SDUPTHER

## 2019-09-12 NOTE — PROGRESS NOTES
[facility] Cardiology  OFFICE NOTE    Cardiovascular Medicine  Jh Palma M.D., RPVI         Jh Palma MD  [address] [place] [address] [place] [address]    Thank you for asking me to see Tatyana Yeager for CAD.    History of Present Illness  This is a 85 y.o. female with:    1. Coronary artery disease involving 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 (CMS/HCC)    2. Peripheral arterial disease (CMS/HCC)    3. Essential hypertension    4. Mixed hyperlipidemia    5. Type 2 diabetes mellitus without complication, without long-term current use of insulin (CMS/HCC)    6. Chronic stable angina (CMS/HCC)          Chief complaint -angina     History of present [id number]-moh[age]-old lady with history of coronary disease prior CABG, with chronic stable angina and  has been on medical therapy with Imdur, amlodipine. metorool and Ranexa but she has been taking only Ranexa as needed because of cost I asked her to take it at least twice a day. Patient and family has been reluctant for invasive evauation due to age and underlying CKD.   Was recently seen by PCP patient has been complaining of bilateral lower extremity sensation of coldness, and burning.  Her symptoms are vague she reports sudden onset of severe sensation of coldness in both of her legs below the knee and then at times she will have it throughout her body.  No claudication per se.  She has been having stable angina with activities of daily living and resolves with sublingual nitroglycerin.  No new complaints at this point.      Review of Systems - ROS  Constitution: Negative for weakness, weight gain and weight loss.   HENT: Negative for congestion.    Eyes: Negative for blurred vision.   Cardiovascular: As mentioned above  Respiratory: Negative for cough and hemoptysis.    Endocrine: Negative for polydipsia and polyuria.   Hematologic/Lymphatic: Negative for bleeding problem. Does not bruise/bleed easily.   Skin: Negative  for flushing.   Musculoskeletal: Negative for neck pain and stiffness.   Gastrointestinal: Negative for abdominal pain, diarrhea, jaundice, melena, nausea and vomiting.   Genitourinary: Negative for dysuria and hematuria.   Neurological: Negative for dizziness, focal weakness and numbness.   Psychiatric/Behavioral: Negative for altered mental status and depression.          All other systems were reviewed and were negative.    family history includes Cirrhosis in her mother; Coronary artery disease in her other; Diabetes in her other; Heart disease in her other; Throat cancer in her father.     reports that she has quit smoking. She has a 14.00 pack-year smoking history. She has never used smokeless tobacco. She reports that she does not drink alcohol or use drugs.    Allergies   Allergen Reactions   • Prilosec [Omeprazole] Other (See Comments)     Gives her cramps in legs    • Statins Other (See Comments)     Gives patient muscle and joint pain    • Flexeril [Cyclobenzaprine] Unknown (See Comments)     Leg spasms   • Zetia [Ezetimibe] Other (See Comments)     Leg cramps         Current Outpatient Medications:   •  ALPRAZolam (XANAX) 1 MG tablet, Take 1 tablet by mouth At Night As Needed for Anxiety. Fill when due, Disp: 30 tablet, Rfl: 1  •  amLODIPine (NORVASC) 5 MG tablet, Take 1 tablet by mouth Daily., Disp: 90 tablet, Rfl: 1  •  betamethasone dipropionate (DIPROLENE) 0.05 % cream, Apply  topically to the appropriate area as directed 2 (Two) Times a Day. Until lesions subside, repeat for recurrence, Disp: 45 g, Rfl: 2  •  cholestyramine light (PREVALITE) 4 g packet, Take 1 packet by mouth 2 (Two) Times a Day. Mix in 4-8 oz liquid. For cholesterol, Disp: 60 packet, Rfl: 3  •  clopidogrel (PLAVIX) 75 MG tablet, Take 1 tablet by mouth Daily., Disp: 90 tablet, Rfl: 1  •  docusate sodium (COLACE) 250 MG capsule, Take 1 capsule by mouth Daily., Disp: 30 capsule, Rfl: 6  •  doxepin (SINEquan) 100 MG capsule, Take 1  capsule by mouth Every Night., Disp: 90 capsule, Rfl: 1  •  ezetimibe (ZETIA) 10 MG tablet, Take 1 tablet by mouth Daily. For cholesterol, Disp: 30 tablet, Rfl: 3  •  famotidine (PEPCID) 20 MG tablet, Take 1 tablet by mouth 2 (Two) Times a Day As Needed for Heartburn., Disp: 180 tablet, Rfl: 1  •  glimepiride (AMARYL) 1 MG tablet, Take 1 tablet by mouth 2 (Two) Times a Day., Disp: 180 tablet, Rfl: 0  •  glucose blood test strip, To test blood glucose 1 - 2 times a day, Disp: 100 each, Rfl: 3  •  glucose monitor monitoring kit, For diabetes mellitus, Disp: 1 each, Rfl: 0  •  HYDROcodone-acetaminophen (NORCO) 7.5-325 MG per tablet, Take 1 tablet by mouth 2 (Two) Times a Day As Needed for Moderate Pain . Fill when due, Disp: 60 tablet, Rfl: 0  •  isosorbide mononitrate (IMDUR) 30 MG 24 hr tablet, Take 1 tablet by mouth Daily., Disp: 90 tablet, Rfl: 1  •  Lancets misc, To test blood glucose 1 -2 times a day, Disp: 100 each, Rfl: 3  •  levothyroxine (SYNTHROID, LEVOTHROID) 88 MCG tablet, TAKE 1 TABLET EVERY DAY, Disp: 90 tablet, Rfl: 0  •  lisinopril (PRINIVIL,ZESTRIL) 2.5 MG tablet, Take 1 tablet by mouth Daily., Disp: 90 tablet, Rfl: 1  •  metoprolol succinate XL (TOPROL-XL) 50 MG 24 hr tablet, Take 1 tablet by mouth Daily., Disp: 90 tablet, Rfl: 1  •  Omega-3 Fatty Acids (FISH OIL) 1000 MG capsule capsule, Take 2 capsules by mouth Daily With Breakfast., Disp: 270 capsule, Rfl: 1  •  permethrin (ELIMITE) 5 % cream, Apply from head to toe, leave 8-12 hours then wash off. Repeat in 1 week, Disp: 1 each, Rfl: 1  •  ranolazine (RANEXA) 500 MG 12 hr tablet, TAKE 1 TABLET BY MOUTH EVERY 12 (TWELVE) HOURS., Disp: 180 tablet, Rfl: 0  •  nitroglycerin (NITROSTAT) 0.4 MG SL tablet, 1 under the tongue as needed for angina, may repeat q5mins for up three doses, Disp: 25 tablet, Rfl: 6    Physical Exam:  Vitals:    09/12/19 1057   BP: 130/60   BP Location: Right arm   Patient Position: Sitting   Cuff Size: Adult   Pulse: 70   SpO2:  "98%   Weight: 74.4 kg (164 lb)   Height: 157.5 cm (62.01\")     Current Pain Level: none  Pulse Ox: Normal  on room air  General: alert, appears stated age and cooperative     Body Habitus: well-nourished    HEENT: Head: Normocephalic, no lesions, without obvious abnormality. No arcus senilis, xanthelasma or xanthomas.    Neuro: alert, oriented x3  Pulses: 2+ and symmetric  JVP: Volume/Pulsation: Normal.  Normal waveforms.   Appropriate inspiratory decrease.  No Kussmaul's. No Benjamin's.   Carotid Exam: no bruit normal pulsation bilaterally   Carotid Volume: normal.     Respirations: no increased work of breathing   Chest:  Normal    Pulmonary:Normal   Precordium: Normal impulses. P2 is not palpable.  RV Heave: absent  LV Heave: absent  [place]:  normal size and placement  Palpable S4: absent.  Heart rate: normal    Heart Rhythm: regular     Heart Sounds: S1: normal  S2: Absent.  S3: absent   S4: absent  Harsh ejection systolic murmur audible in aortic area radiating to carotids.  S2 not audible.  Opening Snap: absent    Pericardial Rub:  Absent: .    Abdomen:   Appearance: normal .  Palpation: Soft, non-tender to palpation, bowel sounds positive in all four quadrants; no guarding or rebound tenderness  Extremity: no edema.   LE Skin: no rashes  LE Hair:  normal  LE Pulses: well perfused with normal pulses in the distal extremities  Pallor on elevation: Absent. Rubor on dependency: None      DATA REVIEWED:       ECG/EMG Results (all)     None        ---------------------------------------------------  TTE/RHIANNON:  Results for orders placed during the hospital encounter of 08/30/18   Adult Transthoracic Echo Complete W/ Cont if Necessary Per Protocol    Narrative · Preserved systolic biventricular function. Estimated LV EF is 65%. Mild   concentric hypertrophy is present with pseudonormalization.  · The aortic valve is calcified and thickened. Moderate aortic valve   stenosis is present.  · The mitral valve is abnormal " with rheumatic changes and mild MAC.   Moderate-to-severe mitral valve regurgitation is present. Mild-to-moderate   mitral valve stenosis is present.  · No evidence of pulmonary hypertension is present.            --------------------------------------------------------------------------------------------------  LABS:     The CVD Risk score (Beba, et al., 2008) failed to calculate for the following reasons:    The 2008 CVD risk score is only valid for ages 30 to 74    The patient has a prior MI, stroke, CHF, or peripheral vascular disease diagnosis         Lab Results   Component Value Date    GLUCOSE 102 (H) 06/20/2019    BUN 25 (H) 06/20/2019    CREATININE 1.29 (H) 06/20/2019    EGFRIFNONA 39 06/20/2019    BCR 19.4 06/20/2019    K 4.7 06/20/2019    CO2 27.0 06/20/2019    CALCIUM 10.0 06/20/2019    ALBUMIN 4.40 06/20/2019    AST 20 06/20/2019    ALT 13 06/20/2019     Lab Results   Component Value Date    WBC 7.83 06/20/2019    HGB 12.2 06/20/2019    HCT 36.2 06/20/2019    MCV 93.5 06/20/2019     06/20/2019     Lab Results   Component Value Date    CHOL 323 (H) 08/14/2019    CHLPL 258 (H) 09/17/2015    TRIG 249 (H) 08/14/2019    HDL 38 (L) 08/14/2019     (H) 08/14/2019     Lab Results   Component Value Date    TSH 1.920 06/20/2019     Lab Results   Component Value Date    TROPONINI 1.870 (C) 08/30/2018     Lab Results   Component Value Date    HGBA1C 5.54 06/20/2019     No results found for: DDIMER  Lab Results   Component Value Date    ALT 13 06/20/2019     Lab Results   Component Value Date    HGBA1C 5.54 06/20/2019    HGBA1C 5.7 (H) 12/14/2018    HGBA1C 5.6 11/15/2017     Lab Results   Component Value Date    MICROALBUR 3.1 12/14/2018    CREATININE 1.29 (H) 06/20/2019     Lab Results   Component Value Date    IRON 109 06/20/2019    TIBC 283 (L) 06/20/2019     Lab Results   Component Value Date    INR 1.0 09/16/2015    PROTIME 12.9 09/16/2015       Assessment/Plan     CAD with prior history of  CABG with chronic angina, on Ranexa, Imdur.  She is on antiplatelet therapy with aspirin and Plavix.  She continues to have chronic stable angina and has been using nitroglycerin about once every other day.  She is taking her Ranexa only once a day.  I advised her to start taking it twice a day as Dr. Grimm is prescribed her.  We will also increases her Imdur from 30 mg to 60 mg.  She has room and her calcium channel blocker as well.  I had a long discussion with the patient and the family and they are very reluctant for invasive cardiac catheterization at this point due to her age, CKD and the fact that she was in the hospital for a long time after her previous procedure.  Of note patient also had moderate aortic stenosis on last echocardiogram and will reassess with repeat echocardiogram to assess for progression since that could be contributing to her angina as well.    Diabetes on oral agents.     Hypertension on amlodipine, Imdur, Toprol-XL.       Hyperlipidemia on Zetia as she cannot tolerate any of the statins.    Valvular abnormality:  Moderate aortic stenosis and mitral stenosis on previous echo.  Repeat echocardiogram.    Lower extremity symptoms:  We will check KALEN and venous Dopplers for further evaluation.  Her symptoms could be related to fibromyalgia as well.       Prevention:    Patient's Body mass index is 29.99 kg/m². BMI is above normal parameters. Recommendations include: exercise counseling and nutrition counseling.      Tatyana Yeager is not a smoker    AAA Screening:   Not needed    Return in about 4 months (around 1/12/2020).          This document has been electronically signed by Jh Palma MD on September 12, 2019 11:34 AM

## 2019-09-28 DIAGNOSIS — F41.1 GENERALIZED ANXIETY DISORDER: ICD-10-CM

## 2019-09-30 RX ORDER — ALPRAZOLAM 1 MG/1
TABLET ORAL
Qty: 30 TABLET | Refills: 0 | Status: SHIPPED | OUTPATIENT
Start: 2019-09-30 | End: 2019-10-07 | Stop reason: SDUPTHER

## 2019-10-07 ENCOUNTER — TELEPHONE (OUTPATIENT)
Dept: CARDIOLOGY | Facility: CLINIC | Age: 84
End: 2019-10-07

## 2019-10-07 ENCOUNTER — LAB (OUTPATIENT)
Dept: LAB | Facility: OTHER | Age: 84
End: 2019-10-07

## 2019-10-07 ENCOUNTER — OFFICE VISIT (OUTPATIENT)
Dept: FAMILY MEDICINE CLINIC | Facility: CLINIC | Age: 84
End: 2019-10-07

## 2019-10-07 VITALS
HEART RATE: 62 BPM | BODY MASS INDEX: 30.77 KG/M2 | RESPIRATION RATE: 16 BRPM | SYSTOLIC BLOOD PRESSURE: 128 MMHG | TEMPERATURE: 97.3 F | OXYGEN SATURATION: 96 % | WEIGHT: 167.2 LBS | DIASTOLIC BLOOD PRESSURE: 68 MMHG | HEIGHT: 62 IN

## 2019-10-07 DIAGNOSIS — E03.9 ACQUIRED HYPOTHYROIDISM: ICD-10-CM

## 2019-10-07 DIAGNOSIS — G47.00 INSOMNIA, UNSPECIFIED TYPE: ICD-10-CM

## 2019-10-07 DIAGNOSIS — D72.829 LEUKOCYTOSIS, UNSPECIFIED TYPE: Primary | ICD-10-CM

## 2019-10-07 DIAGNOSIS — R79.0 ABNORMAL RESULT OF IRON PROFILE TESTING: Primary | ICD-10-CM

## 2019-10-07 DIAGNOSIS — N39.0 URINARY TRACT INFECTION WITHOUT HEMATURIA, SITE UNSPECIFIED: ICD-10-CM

## 2019-10-07 DIAGNOSIS — M54.50 CHRONIC MIDLINE LOW BACK PAIN WITHOUT SCIATICA: ICD-10-CM

## 2019-10-07 DIAGNOSIS — F41.1 GENERALIZED ANXIETY DISORDER: ICD-10-CM

## 2019-10-07 DIAGNOSIS — E11.42 TYPE 2 DIABETES MELLITUS WITH DIABETIC POLYNEUROPATHY, WITHOUT LONG-TERM CURRENT USE OF INSULIN (HCC): ICD-10-CM

## 2019-10-07 DIAGNOSIS — I20.8 CHRONIC STABLE ANGINA (HCC): ICD-10-CM

## 2019-10-07 DIAGNOSIS — I10 ESSENTIAL HYPERTENSION: ICD-10-CM

## 2019-10-07 DIAGNOSIS — K21.9 GASTROESOPHAGEAL REFLUX DISEASE WITHOUT ESOPHAGITIS: ICD-10-CM

## 2019-10-07 DIAGNOSIS — G89.29 CHRONIC MIDLINE LOW BACK PAIN WITHOUT SCIATICA: ICD-10-CM

## 2019-10-07 DIAGNOSIS — E61.1 IRON DEFICIENCY: ICD-10-CM

## 2019-10-07 LAB
BASOPHILS # BLD AUTO: 0.04 10*3/MM3 (ref 0–0.2)
BASOPHILS NFR BLD AUTO: 0.3 % (ref 0–1.5)
DEPRECATED RDW RBC AUTO: 43.2 FL (ref 37–54)
EOSINOPHIL # BLD AUTO: 0.08 10*3/MM3 (ref 0–0.4)
EOSINOPHIL NFR BLD AUTO: 0.7 % (ref 0.3–6.2)
ERYTHROCYTE [DISTWIDTH] IN BLOOD BY AUTOMATED COUNT: 12.9 % (ref 12.3–15.4)
HCT VFR BLD AUTO: 34.5 % (ref 34–46.6)
HGB BLD-MCNC: 11.7 G/DL (ref 12–15.9)
LYMPHOCYTES # BLD AUTO: 2.46 10*3/MM3 (ref 0.7–3.1)
LYMPHOCYTES NFR BLD AUTO: 21 % (ref 19.6–45.3)
MCH RBC QN AUTO: 32.1 PG (ref 26.6–33)
MCHC RBC AUTO-ENTMCNC: 33.9 G/DL (ref 31.5–35.7)
MCV RBC AUTO: 94.8 FL (ref 79–97)
MONOCYTES # BLD AUTO: 0.79 10*3/MM3 (ref 0.1–0.9)
MONOCYTES NFR BLD AUTO: 6.7 % (ref 5–12)
NEUTROPHILS # BLD AUTO: 8.36 10*3/MM3 (ref 1.7–7)
NEUTROPHILS NFR BLD AUTO: 71.3 % (ref 42.7–76)
PLATELET # BLD AUTO: 202 10*3/MM3 (ref 140–450)
PMV BLD AUTO: 10.3 FL (ref 6–12)
RBC # BLD AUTO: 3.64 10*6/MM3 (ref 3.77–5.28)
WBC NRBC COR # BLD: 11.73 10*3/MM3 (ref 3.4–10.8)

## 2019-10-07 PROCEDURE — 99214 OFFICE O/P EST MOD 30 MIN: CPT | Performed by: NURSE PRACTITIONER

## 2019-10-07 PROCEDURE — 83540 ASSAY OF IRON: CPT | Performed by: NURSE PRACTITIONER

## 2019-10-07 PROCEDURE — 36415 COLL VENOUS BLD VENIPUNCTURE: CPT | Performed by: NURSE PRACTITIONER

## 2019-10-07 PROCEDURE — 84466 ASSAY OF TRANSFERRIN: CPT | Performed by: NURSE PRACTITIONER

## 2019-10-07 PROCEDURE — 85025 COMPLETE CBC W/AUTO DIFF WBC: CPT | Performed by: NURSE PRACTITIONER

## 2019-10-07 RX ORDER — LISINOPRIL 2.5 MG/1
2.5 TABLET ORAL DAILY
Qty: 90 TABLET | Refills: 1 | Status: SHIPPED | OUTPATIENT
Start: 2019-10-07 | End: 2020-03-19 | Stop reason: SDUPTHER

## 2019-10-07 RX ORDER — FAMOTIDINE 20 MG/1
20 TABLET, FILM COATED ORAL 2 TIMES DAILY PRN
Qty: 180 TABLET | Refills: 1 | Status: SHIPPED | OUTPATIENT
Start: 2019-10-07 | End: 2020-03-19 | Stop reason: SDUPTHER

## 2019-10-07 RX ORDER — DOXEPIN HYDROCHLORIDE 100 MG/1
100 CAPSULE ORAL NIGHTLY
Qty: 90 CAPSULE | Refills: 1 | Status: SHIPPED | OUTPATIENT
Start: 2019-10-07 | End: 2020-03-19 | Stop reason: SDUPTHER

## 2019-10-07 RX ORDER — NITROGLYCERIN 0.3 MG/1
TABLET SUBLINGUAL
Qty: 100 TABLET | Refills: 11 | Status: SHIPPED | OUTPATIENT
Start: 2019-10-07 | End: 2020-01-14 | Stop reason: DRUGHIGH

## 2019-10-07 RX ORDER — HYDROCODONE BITARTRATE AND ACETAMINOPHEN 7.5; 325 MG/1; MG/1
1 TABLET ORAL 2 TIMES DAILY PRN
Qty: 60 TABLET | Refills: 0 | Status: SHIPPED | OUTPATIENT
Start: 2019-10-07 | End: 2019-11-06 | Stop reason: SDUPTHER

## 2019-10-07 RX ORDER — ISOSORBIDE MONONITRATE 60 MG/1
60 TABLET, EXTENDED RELEASE ORAL EVERY MORNING
Qty: 90 TABLET | Refills: 1 | Status: SHIPPED | OUTPATIENT
Start: 2019-10-07 | End: 2019-11-21

## 2019-10-07 RX ORDER — GLIMEPIRIDE 1 MG/1
1 TABLET ORAL 2 TIMES DAILY
Qty: 180 TABLET | Refills: 0 | Status: SHIPPED | OUTPATIENT
Start: 2019-10-07 | End: 2020-01-09

## 2019-10-07 RX ORDER — CLOPIDOGREL BISULFATE 75 MG/1
75 TABLET ORAL DAILY
Qty: 90 TABLET | Refills: 1 | Status: SHIPPED | OUTPATIENT
Start: 2019-10-07 | End: 2020-03-19 | Stop reason: SDUPTHER

## 2019-10-07 RX ORDER — LEVOTHYROXINE SODIUM 88 UG/1
88 TABLET ORAL DAILY
Qty: 90 TABLET | Refills: 1 | Status: SHIPPED | OUTPATIENT
Start: 2019-10-07 | End: 2020-03-19 | Stop reason: SDUPTHER

## 2019-10-07 RX ORDER — AMLODIPINE BESYLATE 5 MG/1
5 TABLET ORAL DAILY
Qty: 90 TABLET | Refills: 1 | Status: SHIPPED | OUTPATIENT
Start: 2019-10-07 | End: 2019-11-21

## 2019-10-07 RX ORDER — ALPRAZOLAM 1 MG/1
1 TABLET ORAL NIGHTLY PRN
Qty: 30 TABLET | Refills: 0 | Status: SHIPPED | OUTPATIENT
Start: 2019-10-07 | End: 2019-11-06 | Stop reason: SDUPTHER

## 2019-10-07 RX ORDER — RANOLAZINE 500 MG/1
500 TABLET, EXTENDED RELEASE ORAL EVERY 12 HOURS SCHEDULED
Qty: 180 TABLET | Refills: 0 | Status: SHIPPED | OUTPATIENT
Start: 2019-10-07 | End: 2020-01-16 | Stop reason: SDUPTHER

## 2019-10-07 RX ORDER — METOPROLOL SUCCINATE 50 MG/1
50 TABLET, EXTENDED RELEASE ORAL DAILY
Qty: 90 TABLET | Refills: 1 | Status: SHIPPED | OUTPATIENT
Start: 2019-10-07 | End: 2020-03-19 | Stop reason: SDUPTHER

## 2019-10-07 NOTE — PROGRESS NOTES
Chief Complaint   Patient presents with   • Pain     3 mo f/u med refills     Subjective   Tatyana Yeager is a 85 y.o. female who presents to the office for routine follow up of chronic pain requiring opioid pain medication and anxiety requiring benzodiazepine. Due for refill today. Also due for iron profile due to abnormal TIBC and transferrin in June 2019. Requires refill of multiple chronic medications.     The following portions of the patient's history were reviewed and updated as appropriate: allergies, current medications, past family history, past medical history, past social history, past surgical history and problem list.    History of Present Illness   Last UDS 8/14/19 appropriate  Fasting labs ( no lipid)  6/20/19  Iron profile  Transferrin 190  TIBC 283 (started on MVI with iron at that time)  CBC normal  CMP gluc 102, BUN 25 creat 1.29  Na 146  T4, TSH normal  A1C normal  Last lipid: 6/6/18  tchol 324  Trig 168 unable to tolerated statins, on Zetia only.       CAD of native heart, A/P CABD x 2 2005, with recurrent angina. Denies current angina. Current patient Dr Jh Palma, managed on amlodipine, Plavix, Imdur, lisionpril, Toprol XL, Ranexa, and PRN NTG. Last seen by him on 9/12/19 and referral to specialist re: valvular dysfunction.  This is still pending.       chronic lower back pain:;Chronic mildline lumbosacral pain, no sciatica. MRI 7/1/2016:  Mild to moderate DDD L3-S1, small disc protrusions. Mild to mod faraminal narrowing bilaterally L5-S1, milder at L4-5 and left >R at that level. No more recent imaging is seen.  She is a poor candidate for NSAIDS due to CAD/ PAD and Plavix use. Managed well with hydrocodone, due for refill today.   Chronic right shoulder pain: arthritic changes, chronic pain. Managed well with hydrocodone, due for refill today.  BRITTANEY:  Has been managed with Xanax for 38 years, along with Doxepin. Tried and failed multiple antianxiety/ antidepressant meds.  Due for  refill today.     No new problems or complaints today.   Iron binding capacity and transferrin abnormal: started on MVI with iron in June by Dr Mon, due for repeat labs today     Past Medical History:   Diagnosis Date   • Acquired hypothyroidism    • Aortic valve stenosis    • Artificial lens present    • Borderline glaucoma    • Chronic depression    • Chronic pain    • Corneal foreign body     OS   • Coronary arteriosclerosis    • Cortical senile cataract    • Diabetes mellitus (CMS/HCC)     NO RETINOPATHY   • Diverticular disease of colon    • Essential hypertension    • Generalized anxiety disorder    • GERD (gastroesophageal reflux disease)    • Histoplasmosis with retinitis    • History of bone density study 03/26/2009    DXA BONE DENSITY AXIAL 11946 (Normal for the hips and lumbar region. Hips represent 3.9% improvement.Lumbar represents 1.3 % improvement)   • History of echocardiogram 09/16/2015    Echocardiogram W/ color flow 64164 (Overall LV contractility normal with Ef of 55% with LVH and diastolic relaxation abnormality of the left ventricle.Moderate to severe AV stenosis.Mild mitral regurg.No intracardiac mass or thrombus.)   • History of mammogram 03/26/2009    benign findings    • Hyperlipidemia     not able to take statin     • Joint pain    • Low back pain    • Mammogram declined 2013   • Nonexudative age-related macular degeneration     MILD   • Nuclear cataract    • Peripheral vascular disease (CMS/HCC)    • Posterior subcapsular polar senile cataract    • Primary open angle glaucoma    • Transient ischemic attack involving carotid artery     Carotid territory transient ischemic attack     • Transient visual loss    • Type 2 diabetes mellitus (CMS/HCC)           Family History   Problem Relation Age of Onset   • Cirrhosis Mother         LIVER   • Throat cancer Father    • Coronary artery disease Other    • Diabetes Other    • Heart disease Other    • Breast cancer Neg Hx    • Colon cancer Neg  "Hx    • Endometrial cancer Neg Hx         Review of Systems   Constitutional: Positive for chills (of bilateral LEs and tops of feet.). Negative for appetite change, fatigue, fever and unexpected weight change.   HENT: Negative.  Negative for congestion, ear pain, rhinorrhea and sore throat.    Eyes: Negative.  Negative for pain.   Respiratory: Negative.  Negative for cough, chest tightness and shortness of breath.    Cardiovascular: Positive for leg swelling (bilateral ankles intermittently not today). Negative for chest pain and palpitations.   Gastrointestinal: Negative.  Negative for abdominal pain, constipation, diarrhea and nausea.   Endocrine: Negative.    Genitourinary: Negative.  Negative for dysuria.   Musculoskeletal: Positive for arthralgias (right shoulder, chronic) and back pain (chronic lower lumbar, midline, no sciatic). Negative for joint swelling and neck pain.   Skin: Negative.  Negative for color change, pallor, rash and wound.        See HPI about vessicle right hand palmar surface   Allergic/Immunologic: Negative.    Neurological: Negative.  Negative for dizziness and headaches.   Hematological: Negative.    Psychiatric/Behavioral: Negative.  Negative for dysphoric mood, sleep disturbance and suicidal ideas. The patient is not nervous/anxious.    All other systems reviewed and are negative.      Objective   Vitals:    10/07/19 1039   BP: 128/68   BP Location: Left arm   Patient Position: Sitting   Cuff Size: Adult   Pulse: 62   Resp: 16   Temp: 97.3 °F (36.3 °C)   TempSrc: Tympanic   SpO2: 96%   Weight: 75.8 kg (167 lb 3.2 oz)   Height: 157.5 cm (62.01\")   PainSc: 0-No pain     Physical Exam   Constitutional: She is oriented to person, place, and time. She appears well-developed and well-nourished.   HENT:   Head: Normocephalic and atraumatic.   Eyes: Conjunctivae and EOM are normal. Pupils are equal, round, and reactive to light.   Neck: Normal range of motion. Neck supple.   Cardiovascular: " Normal rate, regular rhythm and intact distal pulses. Exam reveals no gallop and no friction rub.   Murmur (2/6) heard.  Pulses of DPP, PTP bilateral are fluctuating 1+-2+ during exam with easily obliterated pulses with gentle pressure, cap refill feet 2-3 sec, warm to touch equally.    Pulmonary/Chest: Effort normal and breath sounds normal. No respiratory distress. She has no wheezes. She has no rales. She exhibits no tenderness.   Abdominal: Soft. Bowel sounds are normal.   Musculoskeletal: Normal range of motion. She exhibits no edema or deformity.        Right shoulder: She exhibits tenderness, bony tenderness and pain.        Lumbar back: She exhibits tenderness and pain.        Back:         Arms:  Lymphadenopathy:     She has no cervical adenopathy.   Neurological: She is alert and oriented to person, place, and time.   Skin: Skin is warm and dry. Capillary refill takes 2 to 3 seconds. No erythema. No pallor.   See HPI re: palmar surface vessicle right hand.   Psychiatric: She has a normal mood and affect. Her behavior is normal. Judgment and thought content normal.   Nursing note and vitals reviewed.      Assessment/Plan   Tatyana was seen today for pain.    Diagnoses and all orders for this visit:    Abnormal result of iron profile testing  -     Iron Profile; Future  -     CBC & Differential    Essential hypertension  -     amLODIPine (NORVASC) 5 MG tablet; Take 1 tablet by mouth Daily.  -     metoprolol succinate XL (TOPROL-XL) 50 MG 24 hr tablet; Take 1 tablet by mouth Daily.    Insomnia, unspecified type  -     doxepin (SINEquan) 100 MG capsule; Take 1 capsule by mouth Every Night.    Gastroesophageal reflux disease without esophagitis  -     famotidine (PEPCID) 20 MG tablet; Take 1 tablet by mouth 2 (Two) Times a Day As Needed for Heartburn.    Type 2 diabetes mellitus with diabetic polyneuropathy, without long-term current use of insulin (CMS/[facility])  -     glimepiride (AMARYL) 1 MG tablet; Take 1 tablet  by mouth 2 (Two) Times a Day.    Acquired hypothyroidism  -     levothyroxine (SYNTHROID, LEVOTHROID) 88 MCG tablet; Take 1 tablet by mouth Daily.    Chronic stable angina (CMS/HCC)  -     ranolazine (RANEXA) 500 MG 12 hr tablet; Take 1 tablet by mouth Every 12 (Twelve) Hours.    Generalized anxiety disorder  -     ALPRAZolam (XANAX) 1 MG tablet; Take 1 tablet by mouth At Night As Needed for Anxiety.    Chronic midline low back pain without sciatica  -     HYDROcodone-acetaminophen (NORCO) 7.5-325 MG per tablet; Take 1 tablet by mouth 2 (Two) Times a Day As Needed for Moderate Pain .    Other orders  -     clopidogrel (PLAVIX) 75 MG tablet; Take 1 tablet by mouth Daily.  -     isosorbide mononitrate (IMDUR) 60 MG 24 hr tablet; Take 1 tablet by mouth Every Morning.  -     lisinopril (PRINIVIL,ZESTRIL) 2.5 MG tablet; Take 1 tablet by mouth Daily.  -     nitroglycerin (NITROSTAT) 0.3 MG SL tablet; 1 under the tongue as needed for angina, may repeat q5mins for up three doses           PHQ-2/PHQ-9 Depression Screening 10/7/2019   Little interest or pleasure in doing things 0   Feeling down, depressed, or hopeless 0   Trouble falling or staying asleep, or sleeping too much 0   Feeling tired or having little energy 0   Poor appetite or overeating 0   Feeling bad about yourself - or that you are a failure or have let yourself or your family down 0   Trouble concentrating on things, such as reading the newspaper or watching television 0   Moving or speaking so slowly that other people could have noticed. Or the opposite - being so fidgety or restless that you have been moving around a lot more than usual 0   Thoughts that you would be better off dead, or of hurting yourself in some way 0   Total Score 0   If you checked off any problems, how difficult have these problems made it for you to do your work, take care of things at home, or get along with other people? -   Patient understands the risks associated with this  controlled medication, including tolerance and addiction.  Patient also agrees to only obtain this medication from me, and not from a another provider, unless that provider is covering for me in my absence.  Patient also agrees to be compliant in dosing, and not self adjust the dose of medication.  A signed controlled substance agreement is on file, and the patient has received a controlled substance education sheet at this a previous visit.  The patient has also signed a consent for treatment with a controlled substance as per [facility] policy. DONAL was obtained.      STEVE Abdi         Return in about 3 months (around 1/7/2020).    Patient Instructions   Repeat iron profile today.

## 2019-10-07 NOTE — TELEPHONE ENCOUNTER
Referral for [facility] sent to nico  I spoke to pt about referral she stated she will think about going to [place] but will probably not go, I told her to think on it and contact us back to let us know her decision. pt verbalized understanding      ----- Message from Yanique Michelle RN sent at 10/4/2019  5:55 PM CDT -----  Dr Palma wants her to be referred to Dr Dawood Trotter at [facility] for severe aortic stenosis. Do you care to call and let her know. I didn't want to call her so late on a Friday and just have her worry all weekend when I can't do anything right now anyway. Can you take care of. I didn't know who to send to since [facility] is not here. Thanks marquise

## 2019-10-08 ENCOUNTER — OFFICE VISIT (OUTPATIENT)
Dept: FAMILY MEDICINE CLINIC | Facility: CLINIC | Age: 84
End: 2019-10-08

## 2019-10-08 ENCOUNTER — LAB (OUTPATIENT)
Dept: LAB | Facility: OTHER | Age: 84
End: 2019-10-08

## 2019-10-08 VITALS
BODY MASS INDEX: 30.73 KG/M2 | RESPIRATION RATE: 16 BRPM | TEMPERATURE: 97.6 F | WEIGHT: 167 LBS | OXYGEN SATURATION: 96 % | HEART RATE: 64 BPM | HEIGHT: 62 IN | DIASTOLIC BLOOD PRESSURE: 77 MMHG | SYSTOLIC BLOOD PRESSURE: 120 MMHG

## 2019-10-08 DIAGNOSIS — D72.829 LEUKOCYTOSIS, UNSPECIFIED TYPE: ICD-10-CM

## 2019-10-08 DIAGNOSIS — L03.818 CELLULITIS OF OTHER SPECIFIED SITE: Primary | ICD-10-CM

## 2019-10-08 DIAGNOSIS — R11.0 NAUSEA: ICD-10-CM

## 2019-10-08 DIAGNOSIS — N39.0 URINARY TRACT INFECTION WITHOUT HEMATURIA, SITE UNSPECIFIED: ICD-10-CM

## 2019-10-08 LAB
BACTERIA UR QL AUTO: ABNORMAL /HPF
BILIRUB UR QL STRIP: NEGATIVE
CLARITY UR: ABNORMAL
COLOR UR: ABNORMAL
GLUCOSE UR STRIP-MCNC: NEGATIVE MG/DL
HGB UR QL STRIP.AUTO: NEGATIVE
HYALINE CASTS UR QL AUTO: ABNORMAL /LPF
IRON 24H UR-MRATE: 36 MCG/DL (ref 37–145)
IRON SATN MFR SERPL: 13 % (ref 20–50)
KETONES UR QL STRIP: NEGATIVE
LEUKOCYTE ESTERASE UR QL STRIP.AUTO: ABNORMAL
MUCOUS THREADS URNS QL MICRO: ABNORMAL /HPF
NITRITE UR QL STRIP: NEGATIVE
OTHER OBSERVATIONS IN URINE MICRO: ABNORMAL /HPF
PH UR STRIP.AUTO: <=5 [PH] (ref 5.5–8)
PROT UR QL STRIP: NEGATIVE
RBC # UR: ABNORMAL /HPF
REF LAB TEST METHOD: ABNORMAL
SP GR UR STRIP: 1.02 (ref 1–1.03)
SQUAMOUS #/AREA URNS HPF: ABNORMAL /HPF
TIBC SERPL-MCNC: 270 MCG/DL (ref 298–536)
TRANS CELLS #/AREA URNS HPF: ABNORMAL /HPF
TRANSFERRIN SERPL-MCNC: 181 MG/DL (ref 200–360)
UROBILINOGEN UR QL STRIP: ABNORMAL
WBC UR QL AUTO: ABNORMAL /HPF

## 2019-10-08 PROCEDURE — 96372 THER/PROPH/DIAG INJ SC/IM: CPT | Performed by: NURSE PRACTITIONER

## 2019-10-08 PROCEDURE — 99214 OFFICE O/P EST MOD 30 MIN: CPT | Performed by: NURSE PRACTITIONER

## 2019-10-08 PROCEDURE — 81001 URINALYSIS AUTO W/SCOPE: CPT | Performed by: NURSE PRACTITIONER

## 2019-10-08 PROCEDURE — 87086 URINE CULTURE/COLONY COUNT: CPT | Performed by: NURSE PRACTITIONER

## 2019-10-08 RX ORDER — PREDNISONE 20 MG/1
TABLET ORAL
Qty: 17 TABLET | Refills: 0 | Status: SHIPPED | OUTPATIENT
Start: 2019-10-08 | End: 2019-10-15

## 2019-10-08 RX ORDER — CEPHALEXIN 500 MG/1
500 CAPSULE ORAL 2 TIMES DAILY
Qty: 14 CAPSULE | Refills: 0 | Status: SHIPPED | OUTPATIENT
Start: 2019-10-08 | End: 2019-10-15

## 2019-10-08 RX ORDER — METHYLPREDNISOLONE ACETATE 80 MG/ML
80 INJECTION, SUSPENSION INTRA-ARTICULAR; INTRALESIONAL; INTRAMUSCULAR; SOFT TISSUE ONCE
Status: COMPLETED | OUTPATIENT
Start: 2019-10-08 | End: 2019-10-08

## 2019-10-08 RX ORDER — CEFTRIAXONE 1 G/1
1 INJECTION, POWDER, FOR SOLUTION INTRAMUSCULAR; INTRAVENOUS ONCE
Status: COMPLETED | OUTPATIENT
Start: 2019-10-08 | End: 2019-10-08

## 2019-10-08 RX ORDER — ONDANSETRON 4 MG/1
4 TABLET, FILM COATED ORAL EVERY 8 HOURS PRN
Qty: 30 TABLET | Refills: 1 | Status: SHIPPED | OUTPATIENT
Start: 2019-10-08 | End: 2019-10-15 | Stop reason: SINTOL

## 2019-10-08 RX ORDER — LANOLIN ALCOHOL/MO/W.PET/CERES
325 CREAM (GRAM) TOPICAL
Qty: 90 TABLET | Refills: 0 | Status: SHIPPED | OUTPATIENT
Start: 2019-10-08 | End: 2020-01-01

## 2019-10-08 RX ADMIN — CEFTRIAXONE 1 G: 1 INJECTION, POWDER, FOR SOLUTION INTRAMUSCULAR; INTRAVENOUS at 14:49

## 2019-10-08 RX ADMIN — METHYLPREDNISOLONE ACETATE 80 MG: 80 INJECTION, SUSPENSION INTRA-ARTICULAR; INTRALESIONAL; INTRAMUSCULAR; SOFT TISSUE at 14:50

## 2019-10-08 NOTE — PROGRESS NOTES
Chief Complaint   Patient presents with   • Pain     ledt breast     Subjective   Tatyana Yeager is a 85 y.o. female who presents to the office for check of U/A due to urgency and change in urine color. Would also like to discuss left breat pain and to clarify iron supplementation.     The following portions of the patient's history were reviewed and updated as appropriate: allergies, current medications, past family history, past medical history, past social history, past surgical history and problem list.    History of Present Illness     UTI: Reports urgency and frequency in urination with change in urine color. She denies pain or burning with urination and fever. Positive UA today.     Left breast pain: Patient reports pain in left breast that began yesterday. It is hot and very painful to touch. She has denies fever. However, she woke up sweating last night and has had nausea since the weekend.     Past Medical History:   Diagnosis Date   • Acquired hypothyroidism    • Aortic valve stenosis    • Artificial lens present    • Borderline glaucoma    • Chronic depression    • Chronic pain    • Corneal foreign body     OS   • Coronary arteriosclerosis    • Cortical senile cataract    • Diabetes mellitus (CMS/HCC)     NO RETINOPATHY   • Diverticular disease of colon    • Essential hypertension    • Generalized anxiety disorder    • GERD (gastroesophageal reflux disease)    • Histoplasmosis with retinitis    • History of bone density study 03/26/2009    DXA BONE DENSITY AXIAL 07027 (Normal for the hips and lumbar region. Hips represent 3.9% improvement.Lumbar represents 1.3 % improvement)   • History of echocardiogram 09/16/2015    Echocardiogram W/ color flow 39196 (Overall LV contractility normal with Ef of 55% with LVH and diastolic relaxation abnormality of the left ventricle.Moderate to severe AV stenosis.Mild mitral regurg.No intracardiac mass or thrombus.)   • History of mammogram 03/26/2009    benign  findings    • Hyperlipidemia     not able to take statin     • Joint pain    • Low back pain    • Mammogram declined 2013   • Nonexudative age-related macular degeneration     MILD   • Nuclear cataract    • Peripheral vascular disease (CMS/HCC)    • Posterior subcapsular polar senile cataract    • Primary open angle glaucoma    • Transient ischemic attack involving carotid artery     Carotid territory transient ischemic attack     • Transient visual loss    • Type 2 diabetes mellitus (CMS/HCC)           Family History   Problem Relation Age of Onset   • Cirrhosis Mother         LIVER   • Throat cancer Father    • Coronary artery disease Other    • Diabetes Other    • Heart disease Other    • Breast cancer Neg Hx    • Colon cancer Neg Hx    • Endometrial cancer Neg Hx         Review of Systems   Constitutional: Positive for chills (of bilateral LEs and tops of feet.) and diaphoresis. Negative for appetite change, fatigue, fever and unexpected weight change.   HENT: Negative.  Negative for congestion, ear pain, rhinorrhea and sore throat.    Eyes: Negative.  Negative for pain.   Respiratory: Negative.  Negative for cough, chest tightness and shortness of breath.    Cardiovascular: Negative for chest pain, palpitations and leg swelling (bilateral ankles intermittently not today).   Gastrointestinal: Negative.  Negative for abdominal pain, constipation, diarrhea and nausea.   Endocrine: Negative.    Genitourinary: Positive for difficulty urinating, frequency and urgency. Negative for dysuria.   Musculoskeletal: Positive for arthralgias (right shoulder, chronic) and back pain (chronic lower lumbar, midline, no sciatic). Negative for joint swelling and neck pain.   Skin: Negative.  Negative for color change, pallor, rash and wound.        See HPI about vessicle right hand palmar surface   Allergic/Immunologic: Negative.    Neurological: Negative.  Negative for dizziness and headaches.   Hematological: Negative.   "  Psychiatric/Behavioral: Negative.  Negative for dysphoric mood, sleep disturbance and suicidal ideas. The patient is not nervous/anxious.    All other systems reviewed and are negative.      Objective   Vitals:    10/08/19 1354   BP: 120/77   BP Location: Left arm   Patient Position: Sitting   Cuff Size: Adult   Pulse: 64   Resp: 16   Temp: 97.6 °F (36.4 °C)   TempSrc: Tympanic   SpO2: 96%   Weight: 75.8 kg (167 lb)   Height: 157.5 cm (62.01\")   PainSc: 0-No pain     Physical Exam   Constitutional: She is oriented to person, place, and time. She appears well-developed and well-nourished.   HENT:   Head: Normocephalic and atraumatic.   Eyes: Conjunctivae and EOM are normal. Pupils are equal, round, and reactive to light.   Neck: Normal range of motion. Neck supple.   Cardiovascular: Normal rate, regular rhythm and intact distal pulses. Exam reveals no gallop and no friction rub.   Murmur (2/6) heard.  Pulses of DPP, PTP bilateral are fluctuating 1+-2+ during exam with easily obliterated pulses with gentle pressure, cap refill feet 2-3 sec, warm to touch equally.    Pulmonary/Chest: Effort normal and breath sounds normal. No respiratory distress. She has no wheezes. She has no rales. She exhibits no tenderness.   Abdominal: Soft. Bowel sounds are normal.   Musculoskeletal: Normal range of motion. She exhibits no edema or deformity.        Right shoulder: She exhibits tenderness, bony tenderness and pain.        Lumbar back: She exhibits tenderness and pain.        Back:         Arms:  Lymphadenopathy:     She has no cervical adenopathy.   Neurological: She is alert and oriented to person, place, and time.   Skin: Skin is warm and dry. Capillary refill takes 2 to 3 seconds. No erythema. No pallor.   See HPI re: palmar surface vessicle right hand.   Psychiatric: She has a normal mood and affect. Her behavior is normal. Judgment and thought content normal.   Nursing note and vitals reviewed.      Assessment/Plan "   Tatyana was seen today for pain.    Diagnoses and all orders for this visit:    Cellulitis of other specified site  -     cephalexin (KEFLEX) 500 MG capsule; Take 1 capsule by mouth 2 (Two) Times a Day for 7 days.  -     cefTRIAXone (ROCEPHIN) injection 1 g  -     methylPREDNISolone acetate (DEPO-medrol) injection 80 mg  -     predniSONE (DELTASONE) 20 MG tablet; Take 3 tablets with first am meal daily on days 1,2, 3, then 2 tablets daily on days 4,5,6 then 1 tablet daily on days 7 and 8.    Urinary tract infection without hematuria, site unspecified   -     cephalexin (KEFLEX) 500 MG capsule; Take 1 capsule by mouth 2 (Two) Times a Day for 7 days.    Nausea  -     ondansetron (ZOFRAN) 4 MG tablet; Take 1 tablet by mouth Every 8 (Eight) Hours As Needed for Nausea or Vomiting.           PHQ-2/PHQ-9 Depression Screening 10/7/2019   Little interest or pleasure in doing things 0   Feeling down, depressed, or hopeless 0   Trouble falling or staying asleep, or sleeping too much 0   Feeling tired or having little energy 0   Poor appetite or overeating 0   Feeling bad about yourself - or that you are a failure or have let yourself or your family down 0   Trouble concentrating on things, such as reading the newspaper or watching television 0   Moving or speaking so slowly that other people could have noticed. Or the opposite - being so fidgety or restless that you have been moving around a lot more than usual 0   Thoughts that you would be better off dead, or of hurting yourself in some way 0   Total Score 0   If you checked off any problems, how difficult have these problems made it for you to do your work, take care of things at home, or get along with other people? -       STEVE Abdi         Return in about 1 week (around 10/15/2019).    There are no Patient Instructions on file for this visit.

## 2019-10-09 LAB — BACTERIA SPEC AEROBE CULT: NO GROWTH

## 2019-10-10 LAB
BH CV LOWER ARTERIAL LEFT ABI RATIO: 0.8
BH CV LOWER ARTERIAL LEFT DORSALIS PEDIS SYS MAX: 122 MMHG
BH CV LOWER ARTERIAL LEFT POST TIBIAL SYS MAX: 118 MMHG
BH CV LOWER ARTERIAL RIGHT ABI RATIO: 0.76
BH CV LOWER ARTERIAL RIGHT DORSALIS PEDIS SYS MAX: 116 MMHG
BH CV LOWER ARTERIAL RIGHT POST TIBIAL SYS MAX: 105 MMHG
UPPER ARTERIAL LEFT ARM BRACHIAL SYS MAX: 149 MMHG
UPPER ARTERIAL RIGHT ARM BRACHIAL SYS MAX: 153 MMHG

## 2019-10-15 ENCOUNTER — OFFICE VISIT (OUTPATIENT)
Dept: FAMILY MEDICINE CLINIC | Facility: CLINIC | Age: 84
End: 2019-10-15

## 2019-10-15 VITALS
TEMPERATURE: 97 F | WEIGHT: 166 LBS | DIASTOLIC BLOOD PRESSURE: 76 MMHG | SYSTOLIC BLOOD PRESSURE: 124 MMHG | HEIGHT: 62 IN | OXYGEN SATURATION: 97 % | RESPIRATION RATE: 16 BRPM | HEART RATE: 68 BPM | BODY MASS INDEX: 30.55 KG/M2

## 2019-10-15 DIAGNOSIS — N76.0 ACUTE VAGINITIS: ICD-10-CM

## 2019-10-15 DIAGNOSIS — H04.123 BILATERAL DRY EYES: ICD-10-CM

## 2019-10-15 DIAGNOSIS — N39.0 URINARY TRACT INFECTION WITHOUT HEMATURIA, SITE UNSPECIFIED: Primary | ICD-10-CM

## 2019-10-15 LAB
BACTERIA UR QL AUTO: ABNORMAL /HPF
BILIRUB UR QL STRIP: NEGATIVE
CLARITY UR: CLEAR
COLOR UR: YELLOW
GLUCOSE UR STRIP-MCNC: NEGATIVE MG/DL
HGB UR QL STRIP.AUTO: NEGATIVE
HYALINE CASTS UR QL AUTO: ABNORMAL /LPF
KETONES UR QL STRIP: NEGATIVE
LEUKOCYTE ESTERASE UR QL STRIP.AUTO: NEGATIVE
MUCOUS THREADS URNS QL MICRO: ABNORMAL /HPF
NITRITE UR QL STRIP: NEGATIVE
PH UR STRIP.AUTO: 5.5 [PH] (ref 5.5–8)
PROT UR QL STRIP: ABNORMAL
RBC # UR: ABNORMAL /HPF
REF LAB TEST METHOD: ABNORMAL
SP GR UR STRIP: 1.02 (ref 1–1.03)
SQUAMOUS #/AREA URNS HPF: ABNORMAL /HPF
UROBILINOGEN UR QL STRIP: ABNORMAL
WBC UR QL AUTO: ABNORMAL /HPF

## 2019-10-15 PROCEDURE — 81001 URINALYSIS AUTO W/SCOPE: CPT | Performed by: NURSE PRACTITIONER

## 2019-10-15 PROCEDURE — 99213 OFFICE O/P EST LOW 20 MIN: CPT | Performed by: NURSE PRACTITIONER

## 2019-10-15 RX ORDER — FLUCONAZOLE 150 MG/1
TABLET ORAL
Qty: 3 TABLET | Refills: 0 | Status: SHIPPED | OUTPATIENT
Start: 2019-10-15 | End: 2020-01-09

## 2019-10-15 NOTE — PROGRESS NOTES
Chief Complaint   Patient presents with   • Urinary Tract Infection     1 week  f/u   • Cellulitis     1 week f/u     Subjective   Tatyana Yeager is a 85 y.o. female who presents to the office for follow up after cellulitis of left breast and UTI.    The following portions of the patient's history were reviewed and updated as appropriate: allergies, current medications, past family history, past medical history, past social history, past surgical history and problem list.    History of Present Illness     UTI: Positive UA 10/8/2019 with nausea and frequency of urination. She received a shot of rocephin, a steroid pack, and began Keflex PO for 7 days which she is still on. She finished the prednisone. Denies fever or UTI symptoms today. She reports feeling shaky and not well today.     Left breast pain: Patient reports pain in left breast that began over 1 week ago. She was diagnosed with cellulitis and started on Keflex. The breast is still mildy red and tender around the nipple. She has denies fever. The nausea has also resolved.       Red irritated eyes: Red and watery eyes that began around the time that the cellulitis of the left breast began. She has a history of dry eyes and is using OTC drops but does not feel like they are helping. She describes the pain as sand feeling. Like there is something in them. She has not changed any lotions or soaps.     Past Medical History:   Diagnosis Date   • Acquired hypothyroidism    • Aortic valve stenosis    • Artificial lens present    • Borderline glaucoma    • Chronic depression    • Chronic pain    • Corneal foreign body     OS   • Coronary arteriosclerosis    • Cortical senile cataract    • Diabetes mellitus (CMS/HCC)     NO RETINOPATHY   • Diverticular disease of colon    • Essential hypertension    • Generalized anxiety disorder    • GERD (gastroesophageal reflux disease)    • Histoplasmosis with retinitis    • History of bone density study 03/26/2009    DXA BONE  DENSITY AXIAL 52057 (Normal for the hips and lumbar region. Hips represent 3.9% improvement.Lumbar represents 1.3 % improvement)   • History of echocardiogram 09/16/2015    Echocardiogram W/ color flow 31679 (Overall LV contractility normal with Ef of 55% with LVH and diastolic relaxation abnormality of the left ventricle.Moderate to severe AV stenosis.Mild mitral regurg.No intracardiac mass or thrombus.)   • History of mammogram 03/26/2009    benign findings    • Hyperlipidemia     not able to take statin     • Joint pain    • Low back pain    • Mammogram declined 2013   • Nonexudative age-related macular degeneration     MILD   • Nuclear cataract    • Peripheral vascular disease (CMS/HCC)    • Posterior subcapsular polar senile cataract    • Primary open angle glaucoma    • Transient ischemic attack involving carotid artery     Carotid territory transient ischemic attack     • Transient visual loss    • Type 2 diabetes mellitus (CMS/HCC)           Family History   Problem Relation Age of Onset   • Cirrhosis Mother         LIVER   • Throat cancer Father    • Coronary artery disease Other    • Diabetes Other    • Heart disease Other    • Breast cancer Neg Hx    • Colon cancer Neg Hx    • Endometrial cancer Neg Hx         Review of Systems   Constitutional: Positive for chills (chronic ) and diaphoresis (chronic). Negative for appetite change, fatigue, fever and unexpected weight change.   HENT: Negative.  Negative for congestion, ear pain, rhinorrhea and sore throat.    Eyes: Negative.  Negative for pain.   Respiratory: Negative.  Negative for cough, chest tightness and shortness of breath.    Cardiovascular: Negative for chest pain, palpitations and leg swelling (bilateral ankles intermittently not today).   Gastrointestinal: Negative.  Negative for abdominal pain, constipation, diarrhea and nausea.   Endocrine: Negative.    Genitourinary: Negative.  Negative for difficulty urinating, dysuria, flank pain,  "frequency, hematuria, urgency, vaginal discharge and vaginal pain.   Musculoskeletal: Positive for arthralgias (right shoulder, chronic) and back pain (chronic lower lumbar, midline, no sciatic). Negative for joint swelling and neck pain.   Skin: Negative.  Negative for color change, pallor, rash and wound.        See HPI about vessicle right hand palmar surface   Allergic/Immunologic: Negative.    Neurological: Negative.  Negative for dizziness and headaches.   Hematological: Negative.    Psychiatric/Behavioral: Negative.  Negative for dysphoric mood, sleep disturbance and suicidal ideas. The patient is not nervous/anxious.    All other systems reviewed and are negative.      Objective   Vitals:    10/15/19 1124   BP: 124/76   BP Location: Left arm   Patient Position: Sitting   Cuff Size: Adult   Pulse: 68   Resp: 16   Temp: 97 °F (36.1 °C)   TempSrc: Tympanic   SpO2: 97%   Weight: 75.3 kg (166 lb)   Height: 157.5 cm (62.01\")   PainSc: 0-No pain     Physical Exam   Constitutional: She is oriented to person, place, and time. She appears well-developed and well-nourished.   HENT:   Head: Normocephalic and atraumatic.   Eyes: Conjunctivae and EOM are normal. Pupils are equal, round, and reactive to light.   Neck: Normal range of motion. Neck supple.   Cardiovascular: Normal rate, regular rhythm and intact distal pulses. Exam reveals no gallop and no friction rub.   Murmur (2/6, blowing) heard.  Pulses of DPP, PTP bilateral are fluctuating 1+-2+ during exam with easily obliterated pulses with gentle pressure, cap refill feet 2-3 sec, warm to touch equally.    Pulmonary/Chest: Effort normal and breath sounds normal. No respiratory distress. She has no wheezes. She has no rales. She exhibits no tenderness.   Abdominal: Soft. Bowel sounds are normal.   Musculoskeletal: Normal range of motion. She exhibits no edema or deformity.        Right shoulder: She exhibits tenderness, bony tenderness and pain.        Lumbar back: " She exhibits tenderness and pain.        Back:         Arms:  Lymphadenopathy:     She has no cervical adenopathy.   Neurological: She is alert and oriented to person, place, and time.   Skin: Skin is warm and dry. Capillary refill takes 2 to 3 seconds. No erythema. No pallor.   See HPI re: palmar surface vessicle right hand.   Psychiatric: She has a normal mood and affect. Her behavior is normal. Judgment and thought content normal.   Nursing note and vitals reviewed.      Assessment/Plan   Tatyana was seen today for urinary tract infection and cellulitis.    Diagnoses and all orders for this visit:    Urinary tract infection without hematuria, site unspecified   -     Urinalysis With Microscopic - Urine, Clean Catch    Bilateral dry eyes  -     Olopatadine HCl 0.7 % solution; Apply 1 drop to eye(s) as directed by provider Daily. Both eyes daily    Acute vaginitis  -     fluconazole (DIFLUCAN) 150 MG tablet; Take one today, repeat every 4 days to prevent antibiotic vag yeast infection           PHQ-2/PHQ-9 Depression Screening 10/7/2019   Little interest or pleasure in doing things 0   Feeling down, depressed, or hopeless 0   Trouble falling or staying asleep, or sleeping too much 0   Feeling tired or having little energy 0   Poor appetite or overeating 0   Feeling bad about yourself - or that you are a failure or have let yourself or your family down 0   Trouble concentrating on things, such as reading the newspaper or watching television 0   Moving or speaking so slowly that other people could have noticed. Or the opposite - being so fidgety or restless that you have been moving around a lot more than usual 0   Thoughts that you would be better off dead, or of hurting yourself in some way 0   Total Score 0   If you checked off any problems, how difficult have these problems made it for you to do your work, take care of things at home, or get along with other people? -       Odalis Leiva, STEVE         Return in  about 1 week (around 10/22/2019).    There are no Patient Instructions on file for this visit.

## 2019-10-18 ENCOUNTER — TELEPHONE (OUTPATIENT)
Dept: CARDIOLOGY | Facility: CLINIC | Age: 84
End: 2019-10-18

## 2019-10-18 DIAGNOSIS — I83.93 VARICOSE VEINS OF BOTH LOWER EXTREMITIES, UNSPECIFIED WHETHER COMPLICATED: Primary | ICD-10-CM

## 2019-10-18 NOTE — TELEPHONE ENCOUNTER
Called and talked to patient daughter who is POA to [facility] to see if she had made a decision about referral to [facility] for sever aortic valve stenosis,  In fact they did call her with an appt but she declined. Will let Dr Palma know

## 2019-10-18 NOTE — TELEPHONE ENCOUNTER
Venous duplex results to patient daughter. Pt needs to wear compression stockings, however she does not have any. Will send RX to patients place of choice. There are to call me back and let me know

## 2019-10-22 ENCOUNTER — OFFICE VISIT (OUTPATIENT)
Dept: FAMILY MEDICINE CLINIC | Facility: CLINIC | Age: 84
End: 2019-10-22

## 2019-10-22 VITALS
RESPIRATION RATE: 16 BRPM | HEART RATE: 68 BPM | DIASTOLIC BLOOD PRESSURE: 70 MMHG | OXYGEN SATURATION: 97 % | WEIGHT: 166 LBS | SYSTOLIC BLOOD PRESSURE: 120 MMHG | TEMPERATURE: 97.3 F | HEIGHT: 62 IN | BODY MASS INDEX: 30.55 KG/M2

## 2019-10-22 DIAGNOSIS — N39.0 URINARY TRACT INFECTION WITHOUT HEMATURIA, SITE UNSPECIFIED: Primary | ICD-10-CM

## 2019-10-22 LAB
BACTERIA UR QL AUTO: ABNORMAL /HPF
BILIRUB UR QL STRIP: NEGATIVE
CLARITY UR: CLEAR
COLOR UR: ABNORMAL
GLUCOSE UR STRIP-MCNC: NEGATIVE MG/DL
HGB UR QL STRIP.AUTO: NEGATIVE
HYALINE CASTS UR QL AUTO: ABNORMAL /LPF
KETONES UR QL STRIP: NEGATIVE
LEUKOCYTE ESTERASE UR QL STRIP.AUTO: ABNORMAL
NITRITE UR QL STRIP: NEGATIVE
PH UR STRIP.AUTO: 5.5 [PH] (ref 5.5–8)
PROT UR QL STRIP: NEGATIVE
RBC # UR: ABNORMAL /HPF
REF LAB TEST METHOD: ABNORMAL
SP GR UR STRIP: 1.02 (ref 1–1.03)
SQUAMOUS #/AREA URNS HPF: ABNORMAL /HPF
UROBILINOGEN UR QL STRIP: ABNORMAL
WBC UR QL AUTO: ABNORMAL /HPF

## 2019-10-22 PROCEDURE — 81001 URINALYSIS AUTO W/SCOPE: CPT | Performed by: NURSE PRACTITIONER

## 2019-10-22 PROCEDURE — 99213 OFFICE O/P EST LOW 20 MIN: CPT | Performed by: NURSE PRACTITIONER

## 2019-10-22 RX ORDER — NITROFURANTOIN 25; 75 MG/1; MG/1
100 CAPSULE ORAL 2 TIMES DAILY
Qty: 14 CAPSULE | Refills: 0 | Status: SHIPPED | OUTPATIENT
Start: 2019-10-22 | End: 2019-10-29

## 2019-10-22 NOTE — PROGRESS NOTES
Chief Complaint   Patient presents with   • Rash     1 week f/u     Subjective   Tatyana Yeager is a 85 y.o. female who presents to the office for follow- up after cellulitis of left breast and UTI.    The following portions of the patient's history were reviewed and updated as appropriate: allergies, current medications, past family history, past medical history, past social history, past surgical history and problem list.    History of Present Illness     UTI: Positive UA 10/8/2019 with nausea and frequency of urination. She received a shot of rocephin, a steroid pack, and began Keflex PO for 7 days which she is still on. She finished the prednisone. Denies fever or UTI symptoms today. She was previously positive 10/15/2019 as well and feeling not well. Since, those symptoms have improved and she will check another UA today.     Left breast pain: Patient reports pain in left breast that began over 2 week ago. She was diagnosed with cellulitis and started on Keflex. The breast is still mildy red and tender around the nipple. She has denies fever. The nausea has also resolved. She has no symptoms today.           Past Medical History:   Diagnosis Date   • Acquired hypothyroidism    • Aortic valve stenosis    • Artificial lens present    • Borderline glaucoma    • Chronic depression    • Chronic pain    • Corneal foreign body     OS   • Coronary arteriosclerosis    • Cortical senile cataract    • Diabetes mellitus (CMS/HCC)     NO RETINOPATHY   • Diverticular disease of colon    • Essential hypertension    • Generalized anxiety disorder    • GERD (gastroesophageal reflux disease)    • Histoplasmosis with retinitis    • History of bone density study 03/26/2009    DXA BONE DENSITY AXIAL 04226 (Normal for the hips and lumbar region. Hips represent 3.9% improvement.Lumbar represents 1.3 % improvement)   • History of echocardiogram 09/16/2015    Echocardiogram W/ color flow 96522 (Overall LV contractility normal with Ef  of 55% with LVH and diastolic relaxation abnormality of the left ventricle.Moderate to severe AV stenosis.Mild mitral regurg.No intracardiac mass or thrombus.)   • History of mammogram 03/26/2009    benign findings    • Hyperlipidemia     not able to take statin     • Joint pain    • Low back pain    • Mammogram declined 2013   • Nonexudative age-related macular degeneration     MILD   • Nuclear cataract    • Peripheral vascular disease (CMS/HCC)    • Posterior subcapsular polar senile cataract    • Primary open angle glaucoma    • Transient ischemic attack involving carotid artery     Carotid territory transient ischemic attack     • Transient visual loss    • Type 2 diabetes mellitus (CMS/HCC)           Family History   Problem Relation Age of Onset   • Cirrhosis Mother         LIVER   • Throat cancer Father    • Coronary artery disease Other    • Diabetes Other    • Heart disease Other    • Breast cancer Neg Hx    • Colon cancer Neg Hx    • Endometrial cancer Neg Hx         Review of Systems   Constitutional: Negative for appetite change, chills, diaphoresis, fatigue, fever and unexpected weight change.   HENT: Negative.  Negative for congestion, ear pain, rhinorrhea and sore throat.    Eyes: Negative.  Negative for pain.   Respiratory: Negative.  Negative for cough, chest tightness and shortness of breath.    Cardiovascular: Negative for chest pain, palpitations and leg swelling (bilateral ankles intermittently not today).   Gastrointestinal: Negative.  Negative for abdominal pain, constipation, diarrhea and nausea.   Endocrine: Negative.    Genitourinary: Negative.  Negative for difficulty urinating, dysuria, flank pain, frequency, hematuria, urgency, vaginal discharge and vaginal pain.   Musculoskeletal: Positive for arthralgias (right shoulder, chronic) and back pain (chronic lower lumbar, midline, no sciatic). Negative for joint swelling and neck pain.   Skin: Negative.  Negative for color change, pallor,  "rash and wound.        See HPI about vessicle right hand palmar surface   Allergic/Immunologic: Negative.    Neurological: Negative.  Negative for dizziness and headaches.   Hematological: Negative.    Psychiatric/Behavioral: Negative.  Negative for dysphoric mood, sleep disturbance and suicidal ideas. The patient is not nervous/anxious.    All other systems reviewed and are negative.      Objective   Vitals:    10/22/19 1101   BP: 120/70   BP Location: Left arm   Patient Position: Sitting   Cuff Size: Adult   Pulse: 68   Resp: 16   Temp: 97.3 °F (36.3 °C)   TempSrc: Tympanic   SpO2: 97%   Weight: 75.3 kg (166 lb)   Height: 157.5 cm (62.01\")   PainSc: 0-No pain     Physical Exam   Constitutional: She is oriented to person, place, and time. She appears well-developed and well-nourished.   HENT:   Head: Normocephalic and atraumatic.   Eyes: Conjunctivae and EOM are normal. Pupils are equal, round, and reactive to light.   Neck: Normal range of motion. Neck supple.   Cardiovascular: Normal rate, regular rhythm and intact distal pulses. Exam reveals no gallop and no friction rub.   Murmur (2/6, blowing) heard.  Pulses of DPP, PTP bilateral are fluctuating 1+-2+ during exam with easily obliterated pulses with gentle pressure, cap refill feet 2-3 sec, warm to touch equally.    Pulmonary/Chest: Effort normal and breath sounds normal. No respiratory distress. She has no wheezes. She has no rales. She exhibits no tenderness.   Abdominal: Soft. Bowel sounds are normal.   Musculoskeletal: Normal range of motion. She exhibits no edema or deformity.        Right shoulder: She exhibits tenderness, bony tenderness and pain.        Lumbar back: She exhibits tenderness and pain.        Back:         Arms:  Lymphadenopathy:     She has no cervical adenopathy.   Neurological: She is alert and oriented to person, place, and time.   Skin: Skin is warm and dry. Capillary refill takes 2 to 3 seconds. No erythema. No pallor.   See HPI re: " palmar surface vessicle right hand.   Psychiatric: She has a normal mood and affect. Her behavior is normal. Judgment and thought content normal.   Nursing note and vitals reviewed.      Assessment/Plan   Tatyana was seen today for rash.    Diagnoses and all orders for this visit:    Urinary tract infection without hematuria, site unspecified   -     Urinalysis With Microscopic - Urine, Clean Catch           PHQ-2/PHQ-9 Depression Screening 10/7/2019   Little interest or pleasure in doing things 0   Feeling down, depressed, or hopeless 0   Trouble falling or staying asleep, or sleeping too much 0   Feeling tired or having little energy 0   Poor appetite or overeating 0   Feeling bad about yourself - or that you are a failure or have let yourself or your family down 0   Trouble concentrating on things, such as reading the newspaper or watching television 0   Moving or speaking so slowly that other people could have noticed. Or the opposite - being so fidgety or restless that you have been moving around a lot more than usual 0   Thoughts that you would be better off dead, or of hurting yourself in some way 0   Total Score 0   If you checked off any problems, how difficult have these problems made it for you to do your work, take care of things at home, or get along with other people? -       Odalis Leiva, APRN         Return for Next scheduled follow up.    There are no Patient Instructions on file for this visit.

## 2019-11-06 DIAGNOSIS — M54.50 CHRONIC MIDLINE LOW BACK PAIN WITHOUT SCIATICA: ICD-10-CM

## 2019-11-06 DIAGNOSIS — F41.1 GENERALIZED ANXIETY DISORDER: ICD-10-CM

## 2019-11-06 DIAGNOSIS — G89.29 CHRONIC MIDLINE LOW BACK PAIN WITHOUT SCIATICA: ICD-10-CM

## 2019-11-07 RX ORDER — ALPRAZOLAM 1 MG/1
1 TABLET ORAL NIGHTLY PRN
Qty: 30 TABLET | Refills: 0 | Status: SHIPPED | OUTPATIENT
Start: 2019-11-07 | End: 2019-12-03 | Stop reason: SDUPTHER

## 2019-11-07 RX ORDER — HYDROCODONE BITARTRATE AND ACETAMINOPHEN 7.5; 325 MG/1; MG/1
1 TABLET ORAL 2 TIMES DAILY PRN
Qty: 60 TABLET | Refills: 0 | Status: SHIPPED | OUTPATIENT
Start: 2019-11-07 | End: 2019-12-03 | Stop reason: SDUPTHER

## 2019-11-20 DIAGNOSIS — I25.709 CORONARY ARTERY DISEASE INVOLVING CORONARY BYPASS GRAFT OF NATIVE HEART WITH ANGINA PECTORIS (HCC): Primary | ICD-10-CM

## 2019-11-21 ENCOUNTER — OFFICE VISIT (OUTPATIENT)
Dept: CARDIOLOGY | Facility: CLINIC | Age: 84
End: 2019-11-21

## 2019-11-21 ENCOUNTER — DOCUMENTATION (OUTPATIENT)
Dept: CARDIOLOGY | Facility: CLINIC | Age: 84
End: 2019-11-21

## 2019-11-21 VITALS
SYSTOLIC BLOOD PRESSURE: 122 MMHG | HEART RATE: 76 BPM | BODY MASS INDEX: 30.55 KG/M2 | WEIGHT: 166 LBS | OXYGEN SATURATION: 97 % | HEIGHT: 62 IN | DIASTOLIC BLOOD PRESSURE: 56 MMHG

## 2019-11-21 DIAGNOSIS — E78.2 MIXED HYPERLIPIDEMIA: ICD-10-CM

## 2019-11-21 DIAGNOSIS — I25.709 CORONARY ARTERY DISEASE INVOLVING CORONARY BYPASS GRAFT OF NATIVE HEART WITH ANGINA PECTORIS (HCC): Primary | ICD-10-CM

## 2019-11-21 DIAGNOSIS — I83.93 VARICOSE VEINS OF BOTH LOWER EXTREMITIES, UNSPECIFIED WHETHER COMPLICATED: ICD-10-CM

## 2019-11-21 DIAGNOSIS — R01.1 CARDIAC MURMUR: ICD-10-CM

## 2019-11-21 DIAGNOSIS — I83.93 VARICOSE VEINS OF BOTH LOWER EXTREMITIES, UNSPECIFIED WHETHER COMPLICATED: Primary | ICD-10-CM

## 2019-11-21 DIAGNOSIS — I35.0 AORTIC STENOSIS, SEVERE: ICD-10-CM

## 2019-11-21 DIAGNOSIS — I10 ESSENTIAL HYPERTENSION: ICD-10-CM

## 2019-11-21 PROCEDURE — 99215 OFFICE O/P EST HI 40 MIN: CPT | Performed by: INTERNAL MEDICINE

## 2019-11-21 PROCEDURE — 93000 ELECTROCARDIOGRAM COMPLETE: CPT | Performed by: INTERNAL MEDICINE

## 2019-11-21 RX ORDER — ISOSORBIDE MONONITRATE 30 MG/1
30 TABLET, EXTENDED RELEASE ORAL DAILY
Qty: 30 TABLET | Refills: 11 | Status: SHIPPED | OUTPATIENT
Start: 2019-11-21 | End: 2020-01-01 | Stop reason: SDUPTHER

## 2019-11-21 RX ORDER — AMLODIPINE BESYLATE 10 MG/1
10 TABLET ORAL DAILY
Qty: 90 TABLET | Refills: 3 | Status: SHIPPED | OUTPATIENT
Start: 2019-11-21 | End: 2020-01-01 | Stop reason: SDUPTHER

## 2019-11-21 NOTE — PROGRESS NOTES
[facility] Cardiology  OFFICE NOTE    Cardiovascular Medicine  Jh Palma M.D., RPVI         No referring provider defined for this encounter.    Thank you for asking me to see Tatyana Yeager for CAD.    History of Present Illness  This is a 85 y.o. female with:    1. Coronary artery disease involving 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 (CMS/HCC)    2. Peripheral arterial disease (CMS/HCC)    3. Essential hypertension    4. Mixed hyperlipidemia    5. Type 2 diabetes mellitus without complication, without long-term current use of insulin (CMS/HCC)    6. Chronic stable angina (CMS/[facility])          Chief complaint -angina     History of present [id number] lady with history of coronary disease prior CABG, with chronic stable angina and  has been on medical therapy with Imdur, amlodipine. metorool and Ranexa but she has been taking only Ranexa as needed because of cost I asked her to take it at least twice a day. Patient and family has been reluctant for invasive evauation due to age and underlying CKD.   Was recently seen by PCP patient has been complaining of bilateral lower extremity sensation of coldness, and burning.  Her symptoms are vague she reports sudden onset of severe sensation of coldness in both of her legs below the knee and then at times she will have it throughout her body.  No claudication per se.  She has been having stable angina with activities of daily living and resolves with sublingual nitroglycerin.  No new complaints at this point.    11/21/2019:  No acute issues since last visit.  Her chest pain has improved after taking her  Increasing her Imdur.  No lightheadedness or dizziness.  No loss of consciousness  She is denying any claudications.    Review of Systems - ROS  Constitution: Negative for weakness, weight gain and weight loss.   HENT: Negative for congestion.    Eyes: Negative for blurred vision.   Cardiovascular: As mentioned  above  Respiratory: Negative for cough and hemoptysis.    Endocrine: Negative for polydipsia and polyuria.   Hematologic/Lymphatic: Negative for bleeding problem. Does not bruise/bleed easily.   Skin: Negative for flushing.   Musculoskeletal: Negative for neck pain and stiffness.   Gastrointestinal: Negative for abdominal pain, diarrhea, jaundice, melena, nausea and vomiting.   Genitourinary: Negative for dysuria and hematuria.   Neurological: Negative for dizziness, focal weakness and numbness.   Psychiatric/Behavioral: Negative for altered mental status and depression.          All other systems were reviewed and were negative.    family history includes Cirrhosis in her mother; Coronary artery disease in an other family member; Diabetes in an other family member; Heart disease in an other family member; Throat cancer in her father.     reports that she has quit smoking. She has a 14.00 pack-year smoking history. She has never used smokeless tobacco. She reports that she does not drink alcohol or use drugs.    Allergies   Allergen Reactions   • Prilosec [Omeprazole] Other (See Comments)     Gives her cramps in legs    • Statins Other (See Comments)     Gives patient muscle and joint pain    • Flexeril [Cyclobenzaprine] Unknown (See Comments)     Leg spasms   • Zetia [Ezetimibe] Other (See Comments)     Leg cramps         Current Outpatient Medications:   •  ALPRAZolam (XANAX) 1 MG tablet, Take 1 tablet by mouth At Night As Needed for Anxiety., Disp: 30 tablet, Rfl: 0  •  amLODIPine (NORVASC) 5 MG tablet, Take 1 tablet by mouth Daily., Disp: 90 tablet, Rfl: 1  •  clopidogrel (PLAVIX) 75 MG tablet, Take 1 tablet by mouth Daily., Disp: 90 tablet, Rfl: 1  •  docusate sodium (COLACE) 250 MG capsule, Take 1 capsule by mouth Daily., Disp: 30 capsule, Rfl: 6  •  doxepin (SINEquan) 100 MG capsule, Take 1 capsule by mouth Every Night., Disp: 90 capsule, Rfl: 1  •  famotidine (PEPCID) 20 MG tablet, Take 1 tablet by mouth 2  (Two) Times a Day As Needed for Heartburn., Disp: 180 tablet, Rfl: 1  •  ferrous sulfate 325 (65 FE) MG EC tablet, Take 1 tablet by mouth Daily With Breakfast., Disp: 90 tablet, Rfl: 0  •  fluconazole (DIFLUCAN) 150 MG tablet, Take one today, repeat every 4 days to prevent antibiotic vag yeast infection, Disp: 3 tablet, Rfl: 0  •  glimepiride (AMARYL) 1 MG tablet, Take 1 tablet by mouth 2 (Two) Times a Day., Disp: 180 tablet, Rfl: 0  •  glucose blood test strip, To test blood glucose 1 - 2 times a day, Disp: 100 each, Rfl: 3  •  glucose monitor monitoring kit, For diabetes mellitus, Disp: 1 each, Rfl: 0  •  HYDROcodone-acetaminophen (NORCO) 7.5-325 MG per tablet, Take 1 tablet by mouth 2 (Two) Times a Day As Needed for Moderate Pain ., Disp: 60 tablet, Rfl: 0  •  isosorbide mononitrate (IMDUR) 60 MG 24 hr tablet, Take 1 tablet by mouth Every Morning., Disp: 90 tablet, Rfl: 1  •  Lancets misc, To test blood glucose 1 -2 times a day, Disp: 100 each, Rfl: 3  •  levothyroxine (SYNTHROID, LEVOTHROID) 88 MCG tablet, Take 1 tablet by mouth Daily., Disp: 90 tablet, Rfl: 1  •  lisinopril (PRINIVIL,ZESTRIL) 2.5 MG tablet, Take 1 tablet by mouth Daily., Disp: 90 tablet, Rfl: 1  •  metoprolol succinate XL (TOPROL-XL) 50 MG 24 hr tablet, Take 1 tablet by mouth Daily., Disp: 90 tablet, Rfl: 1  •  nitroglycerin (NITROSTAT) 0.3 MG SL tablet, 1 under the tongue as needed for angina, may repeat q5mins for up three doses, Disp: 100 tablet, Rfl: 11  •  Olopatadine HCl 0.7 % solution, Apply 1 drop to eye(s) as directed by provider Daily. Both eyes daily, Disp: 1 bottle, Rfl: 11  •  Omega-3 Fatty Acids (FISH OIL) 1000 MG capsule capsule, Take 2 capsules by mouth Daily With Breakfast., Disp: 270 capsule, Rfl: 1  •  ranolazine (RANEXA) 500 MG 12 hr tablet, Take 1 tablet by mouth Every 12 (Twelve) Hours., Disp: 180 tablet, Rfl: 0    Physical Exam:  Vitals:    11/21/19 1053   BP: 122/56   BP Location: Left arm   Patient Position: Sitting  "  Cuff Size: Adult   Pulse: 76   SpO2: 97%   Weight: 75.3 kg (166 lb)   Height: 157.5 cm (62.01\")   PainSc: 0-No pain     Current Pain Level: none  Pulse Ox: Normal  on room air  General: alert, appears stated age and cooperative     Body Habitus: well-nourished    HEENT: Head: Normocephalic, no lesions, without obvious abnormality. No arcus senilis, xanthelasma or xanthomas.    Neuro: alert, oriented x3  Pulses: 2+ and symmetric  JVP: Volume/Pulsation: Normal.  Normal waveforms.   Appropriate inspiratory decrease.  No Kussmaul's. No Benjamin's.   Carotid Exam: no bruit normal pulsation bilaterally   Carotid Volume: normal.     Respirations: no increased work of breathing   Chest:  Normal    Pulmonary:Normal   Precordium: Normal impulses. P2 is not palpable.  RV Heave: absent  LV Heave: absent  [place]:  normal size and placement  Palpable S4: absent.  Heart rate: normal    Heart Rhythm: regular     Heart Sounds: S1: normal  S2: Absent.  S3: absent   S4: absent  Harsh ejection systolic murmur audible in aortic area radiating to carotids.  S2 not audible.  Opening Snap: absent    Pericardial Rub:  Absent: .    Abdomen:   Appearance: normal .  Palpation: Soft, non-tender to palpation, bowel sounds positive in all four quadrants; no guarding or rebound tenderness  Extremity: no edema.   LE Skin: no rashes  LE Hair:  normal  LE Pulses: well perfused with normal pulses in the distal extremities  Pallor on elevation: Absent. Rubor on dependency: None      DATA REVIEWED:       ECG/EMG Results (all)     None        ---------------------------------------------------  TTE/RHIANNON:  Results for orders placed during the hospital encounter of 10/04/19   Adult Transthoracic Echo Complete W/ Cont if Necessary Per Protocol    Narrative · Left ventricular systolic function is normal.  · Estimated EF appears to be in the range of 56 - 60%.  · Mild mitral valve regurgitation is present  · Mild mitral valve stenosis is present  · There is " severe calcification of the aortic valve mainly affecting the   non, left and right coronary cusp(s).  · Severe aortic valve stenosis is present.  · Mean gradient/Peak gradient of 40/67mmHg  · NENA by continuity is 0.59cm2 findings are c/w severe aortic stenosis  · Left atrial cavity size is moderately dilated.  · Left ventricular diastolic dysfunction (grade I a) consistent with   impaired relaxation.            --------------------------------------------------------------------------------------------------  LABS:     The CVD Risk score (Beba et al., 2008) failed to calculate for the following reasons:    The 2008 CVD risk score is only valid for ages 30 to 74    The patient has a prior MI, stroke, CHF, or peripheral vascular disease diagnosis         Lab Results   Component Value Date    GLUCOSE 102 (H) 06/20/2019    BUN 25 (H) 06/20/2019    CREATININE 1.29 (H) 06/20/2019    EGFRIFNONA 39 06/20/2019    BCR 19.4 06/20/2019    K 4.7 06/20/2019    CO2 27.0 06/20/2019    CALCIUM 10.0 06/20/2019    ALBUMIN 4.40 06/20/2019    AST 20 06/20/2019    ALT 13 06/20/2019     Lab Results   Component Value Date    WBC 11.73 (H) 10/07/2019    HGB 11.7 (L) 10/07/2019    HCT 34.5 10/07/2019    MCV 94.8 10/07/2019     10/07/2019     Lab Results   Component Value Date    CHOL 323 (H) 08/14/2019    CHLPL 258 (H) 09/17/2015    TRIG 249 (H) 08/14/2019    HDL 38 (L) 08/14/2019     (H) 08/14/2019     Lab Results   Component Value Date    TSH 1.920 06/20/2019     Lab Results   Component Value Date    TROPONINI 1.870 (C) 08/30/2018     Lab Results   Component Value Date    HGBA1C 5.54 06/20/2019     No results found for: DDIMER  Lab Results   Component Value Date    ALT 13 06/20/2019     Lab Results   Component Value Date    HGBA1C 5.54 06/20/2019    HGBA1C 5.7 (H) 12/14/2018    HGBA1C 5.6 11/15/2017     Lab Results   Component Value Date    MICROALBUR 3.1 12/14/2018    CREATININE 1.29 (H) 06/20/2019     Lab Results    Component Value Date    IRON 36 (L) 10/07/2019    TIBC 270 (L) 10/07/2019     Lab Results   Component Value Date    INR 1.0 09/16/2015    PROTIME 12.9 09/16/2015       Assessment/Plan     1. CAD with prior history of CABG with chronic angina, on Ranexa, Imdur.  She is on antiplatelet therapy with aspirin and Plavix.  She continues to have chronic stable angina and has been using nitroglycerin about once every other day.  Her symptoms have improved after taking her Ranexa twice daily.  We will cut back her Imdur to 30 mg in the setting of his aortic stenosis.  I cautioned her for using sublingual nitroglycerin and strength lower extremities.  I am increasing her amlodipine to 5 mg.  I had a long discussion with the patient and the family and they are very reluctant for invasive cardiac catheterization at this point due to her age, CKD and the fact that she was in the hospital for a long time after her previous procedure.  Contribution from aortic stenosis or chest pain as well.    2. Diabetes on oral agents.     3. Hypertension on amlodipine, Imdur, Toprol-XL.       4. Hyperlipidemia on Zetia as she cannot tolerate any of the statins.  We will start her on pravastatin every other day and see if she is tolerated that.    5. Valvular abnormality:  Repeat echo with severe aortic stenosis.  I long discussion with the patient for evaluation for TAVR.  Patient wants to discuss with the family and will let me know.    6. PAD  KALEN showed moderate reduced perfusion.  Denies any claudication at this point.  Continue ASA/plavix  Continue statin    7. Venous reflux:  Vebnous study without evidence of DVT but she did have significant reflux in superficial and deep venous system.   Will start her on compression stockings     Prevention:    Patient's Body mass index is 30.35 kg/m². BMI is above normal parameters. Recommendations include: exercise counseling and nutrition counseling.      Tatyana Yeager is not a smoker    AAA  Screening:   Not needed    Return in about 6 months (around 5/21/2020).          This document has been electronically signed by Jh Palma MD on November 21, 2019 11:01 AM

## 2019-12-03 DIAGNOSIS — M54.50 CHRONIC MIDLINE LOW BACK PAIN WITHOUT SCIATICA: ICD-10-CM

## 2019-12-03 DIAGNOSIS — F41.1 GENERALIZED ANXIETY DISORDER: ICD-10-CM

## 2019-12-03 DIAGNOSIS — G89.29 CHRONIC MIDLINE LOW BACK PAIN WITHOUT SCIATICA: ICD-10-CM

## 2019-12-03 RX ORDER — ALPRAZOLAM 1 MG/1
1 TABLET ORAL NIGHTLY PRN
Qty: 30 TABLET | Refills: 0 | Status: SHIPPED | OUTPATIENT
Start: 2019-12-03 | End: 2019-12-27 | Stop reason: SDUPTHER

## 2019-12-03 RX ORDER — HYDROCODONE BITARTRATE AND ACETAMINOPHEN 7.5; 325 MG/1; MG/1
1 TABLET ORAL 2 TIMES DAILY PRN
Qty: 60 TABLET | Refills: 0 | Status: SHIPPED | OUTPATIENT
Start: 2019-12-03 | End: 2019-12-27 | Stop reason: SDUPTHER

## 2019-12-27 ENCOUNTER — LAB (OUTPATIENT)
Dept: LAB | Facility: OTHER | Age: 84
End: 2019-12-27

## 2019-12-27 ENCOUNTER — OFFICE VISIT (OUTPATIENT)
Dept: FAMILY MEDICINE CLINIC | Facility: CLINIC | Age: 84
End: 2019-12-27

## 2019-12-27 VITALS
WEIGHT: 164.6 LBS | OXYGEN SATURATION: 91 % | TEMPERATURE: 97.1 F | DIASTOLIC BLOOD PRESSURE: 64 MMHG | SYSTOLIC BLOOD PRESSURE: 124 MMHG | RESPIRATION RATE: 16 BRPM | BODY MASS INDEX: 30.29 KG/M2 | HEART RATE: 85 BPM | HEIGHT: 62 IN

## 2019-12-27 DIAGNOSIS — M54.50 CHRONIC MIDLINE LOW BACK PAIN WITHOUT SCIATICA: ICD-10-CM

## 2019-12-27 DIAGNOSIS — G89.29 CHRONIC MIDLINE LOW BACK PAIN WITHOUT SCIATICA: ICD-10-CM

## 2019-12-27 DIAGNOSIS — F41.1 GENERALIZED ANXIETY DISORDER: ICD-10-CM

## 2019-12-27 DIAGNOSIS — E11.42 TYPE 2 DIABETES MELLITUS WITH DIABETIC POLYNEUROPATHY, WITHOUT LONG-TERM CURRENT USE OF INSULIN (HCC): Primary | ICD-10-CM

## 2019-12-27 DIAGNOSIS — R79.0 ABNORMAL RESULT OF IRON PROFILE TESTING: ICD-10-CM

## 2019-12-27 PROCEDURE — 82043 UR ALBUMIN QUANTITATIVE: CPT | Performed by: NURSE PRACTITIONER

## 2019-12-27 PROCEDURE — 84466 ASSAY OF TRANSFERRIN: CPT | Performed by: NURSE PRACTITIONER

## 2019-12-27 PROCEDURE — 36415 COLL VENOUS BLD VENIPUNCTURE: CPT | Performed by: NURSE PRACTITIONER

## 2019-12-27 PROCEDURE — 83540 ASSAY OF IRON: CPT | Performed by: NURSE PRACTITIONER

## 2019-12-27 PROCEDURE — 99214 OFFICE O/P EST MOD 30 MIN: CPT | Performed by: NURSE PRACTITIONER

## 2019-12-27 PROCEDURE — 82570 ASSAY OF URINE CREATININE: CPT | Performed by: NURSE PRACTITIONER

## 2019-12-27 RX ORDER — ALPRAZOLAM 1 MG/1
1 TABLET ORAL NIGHTLY PRN
Qty: 30 TABLET | Refills: 0 | Status: SHIPPED | OUTPATIENT
Start: 2019-12-27 | End: 2020-01-31 | Stop reason: SDUPTHER

## 2019-12-27 RX ORDER — HYDROCODONE BITARTRATE AND ACETAMINOPHEN 7.5; 325 MG/1; MG/1
1 TABLET ORAL 2 TIMES DAILY PRN
Qty: 60 TABLET | Refills: 0 | Status: SHIPPED | OUTPATIENT
Start: 2019-12-27 | End: 2020-01-31 | Stop reason: SDUPTHER

## 2019-12-27 NOTE — PATIENT INSTRUCTIONS
Take Hydrocodone 7.5mg tid for chronic back pain    Xanax 1mg take for anxiety    Follow up in 3 months

## 2019-12-27 NOTE — PROGRESS NOTES
"Chief Complaint   Patient presents with   • Pain     12 week f/u   • Anxiety     Subjective   Tatyana Yeager is a 85 y.o. female who presents to the office for routine 3 month follow up of chronic pain in low back requiring Hydrocodone, and BRITTANEY managed with Xanax     The following portions of the patient's history were reviewed and updated as appropriate: allergies, current medications, past family history, past medical history, past social history, past surgical history and problem list.    History of Present Illness   PMH: diabetes type 2 with CAD, PVD, glaucoma, HTN, GERD, macular degeneration, hypothyroidism, TIA, AV stenosis and MV stenosis, systolic murmur, iron deficiency.   Due for repeat iron studies today.   Due for urine MA today.   UDS appropriate 8/14/19    Chronic lower back pain  States pain in low back is a \"3\" today, describes pain as \"sore\" and states it is non radiating. She uses a velcro brace at times with moderate benefit. Not a candidate for NSAIDS due to chronic use of Plavix.  Due for refill today.     BRITTANEY is under control with the use of Doxepin and Xanax to patient satisfaction due for refill today. Denies oversedation or adverse side effects.    Past Medical History:   Diagnosis Date   • Acquired hypothyroidism    • Aortic valve stenosis    • Artificial lens present    • Borderline glaucoma    • Chronic depression    • Chronic pain    • Corneal foreign body     OS   • Coronary arteriosclerosis    • Cortical senile cataract    • Diabetes mellitus (CMS/HCC)     NO RETINOPATHY   • Diverticular disease of colon    • Essential hypertension    • Generalized anxiety disorder    • GERD (gastroesophageal reflux disease)    • Histoplasmosis with retinitis    • History of bone density study 03/26/2009    DXA BONE DENSITY AXIAL 78769 (Normal for the hips and lumbar region. Hips represent 3.9% improvement.Lumbar represents 1.3 % improvement)   • History of echocardiogram 09/16/2015    Echocardiogram " W/ color flow 92063 (Overall LV contractility normal with Ef of 55% with LVH and diastolic relaxation abnormality of the left ventricle.Moderate to severe AV stenosis.Mild mitral regurg.No intracardiac mass or thrombus.)   • History of mammogram 03/26/2009    benign findings    • Hyperlipidemia     not able to take statin     • Joint pain    • Low back pain    • Mammogram declined 2013   • Nonexudative age-related macular degeneration     MILD   • Nuclear cataract    • Peripheral vascular disease (CMS/HCC)    • Posterior subcapsular polar senile cataract    • Primary open angle glaucoma    • Transient ischemic attack involving carotid artery     Carotid territory transient ischemic attack     • Transient visual loss    • Type 2 diabetes mellitus (CMS/HCC)           Family History   Problem Relation Age of Onset   • Cirrhosis Mother         LIVER   • Throat cancer Father    • Coronary artery disease Other    • Diabetes Other    • Heart disease Other    • Breast cancer Neg Hx    • Colon cancer Neg Hx    • Endometrial cancer Neg Hx         Review of Systems   Constitutional: Negative.  Negative for fever and unexpected weight change.   HENT: Negative.    Eyes: Negative.    Respiratory: Negative.  Negative for cough, chest tightness and shortness of breath.    Cardiovascular: Negative.  Negative for chest pain.   Gastrointestinal: Negative.    Endocrine: Negative.    Genitourinary: Negative.  Negative for dysuria.   Musculoskeletal: Positive for back pain.        Chronic low back pain taking Hydrocodone 7.5 with moderate relief   Skin: Negative.  Negative for color change, pallor, rash and wound.   Allergic/Immunologic: Negative.    Neurological: Negative.    Hematological: Negative.    Psychiatric/Behavioral: Negative.  Negative for sleep disturbance and suicidal ideas.   All other systems reviewed and are negative.      Objective   Vitals:    12/27/19 1302   BP: 124/64   BP Location: Left arm   Patient Position:  "Sitting   Cuff Size: Adult   Pulse: 85   Resp: 16   Temp: 97.1 °F (36.2 °C)   TempSrc: Tympanic   SpO2: 91%   Weight: 74.7 kg (164 lb 9.6 oz)   Height: 157.5 cm (62.01\")     Physical Exam   Constitutional: She is oriented to person, place, and time. Vital signs are normal. She appears well-developed and well-nourished.   HENT:   Head: Normocephalic and atraumatic.   Cardiovascular: Normal rate, regular rhythm and S2 normal.   Murmur heard.   Systolic murmur is present with a grade of 3/6.  Pulmonary/Chest: Effort normal and breath sounds normal.   Musculoskeletal:        Lumbar back: She exhibits tenderness and pain.        Back:    Neurological: She is alert and oriented to person, place, and time. GCS eye subscore is 4. GCS verbal subscore is 5. GCS motor subscore is 6.   Skin: Skin is warm and dry.   Psychiatric: She has a normal mood and affect.   Nursing note and vitals reviewed.      Assessment/Plan   Tatyana was seen today for pain and anxiety.    Diagnoses and all orders for this visit:    Type 2 diabetes mellitus with diabetic polyneuropathy, without long-term current use of insulin (CMS/[facility])  -     Microalbumin / Creatinine Urine Ratio - Urine, Clean Catch    Chronic midline low back pain without sciatica  -     HYDROcodone-acetaminophen (NORCO) 7.5-325 MG per tablet; Take 1 tablet by mouth 2 (Two) Times a Day As Needed for Moderate Pain .    Generalized anxiety disorder  -     ALPRAZolam (XANAX) 1 MG tablet; Take 1 tablet by mouth At Night As Needed for Anxiety.           PHQ-2/PHQ-9 Depression Screening 12/27/2019   Little interest or pleasure in doing things 0   Feeling down, depressed, or hopeless 0   Trouble falling or staying asleep, or sleeping too much 0   Feeling tired or having little energy 0   Poor appetite or overeating 0   Feeling bad about yourself - or that you are a failure or have let yourself or your family down 0   Trouble concentrating on things, such as reading the newspaper or watching " television 0   Moving or speaking so slowly that other people could have noticed. Or the opposite - being so fidgety or restless that you have been moving around a lot more than usual 0   Thoughts that you would be better off dead, or of hurting yourself in some way 0   Total Score 0   If you checked off any problems, how difficult have these problems made it for you to do your work, take care of things at home, or get along with other people? -   Patient understands the risks associated with this controlled medication, including tolerance and addiction.  Patient also agrees to only obtain this medication from me, and not from a another provider, unless that provider is covering for me in my absence.  Patient also agrees to be compliant in dosing, and not self adjust the dose of medication.  A signed controlled substance agreement is on file, and the patient has received a controlled substance education sheet at this a previous visit.  The patient has also signed a consent for treatment with a controlled substance as per [facility] policy. DONAL was obtained.    STEVE Abdi         Return in about 3 months (around 3/27/2020).    Patient Instructions   Take Hydrocodone 7.5mg tid for chronic back pain    Xanax 1mg take for anxiety    Follow up in 3 months

## 2019-12-28 LAB
ALBUMIN UR-MCNC: 2.4 MG/DL
CREAT UR-MCNC: 224.3 MG/DL
IRON 24H UR-MRATE: 69 MCG/DL (ref 37–145)
IRON SATN MFR SERPL: 28 % (ref 20–50)
MICROALBUMIN/CREAT UR: 10.7 MG/G
TIBC SERPL-MCNC: 250 MCG/DL (ref 298–536)
TRANSFERRIN SERPL-MCNC: 168 MG/DL (ref 200–360)

## 2020-01-01 ENCOUNTER — OFFICE VISIT (OUTPATIENT)
Dept: FAMILY MEDICINE CLINIC | Facility: CLINIC | Age: 85
End: 2020-01-01

## 2020-01-01 ENCOUNTER — TELEPHONE (OUTPATIENT)
Dept: FAMILY MEDICINE CLINIC | Facility: CLINIC | Age: 85
End: 2020-01-01

## 2020-01-01 ENCOUNTER — LAB (OUTPATIENT)
Dept: LAB | Facility: OTHER | Age: 85
End: 2020-01-01

## 2020-01-01 VITALS
WEIGHT: 159.7 LBS | SYSTOLIC BLOOD PRESSURE: 128 MMHG | TEMPERATURE: 98.6 F | RESPIRATION RATE: 18 BRPM | OXYGEN SATURATION: 97 % | HEIGHT: 62 IN | BODY MASS INDEX: 29.39 KG/M2 | HEART RATE: 70 BPM | DIASTOLIC BLOOD PRESSURE: 52 MMHG

## 2020-01-01 VITALS — HEIGHT: 62 IN | WEIGHT: 159 LBS | BODY MASS INDEX: 29.26 KG/M2

## 2020-01-01 DIAGNOSIS — I10 ESSENTIAL HYPERTENSION: ICD-10-CM

## 2020-01-01 DIAGNOSIS — F41.1 GENERALIZED ANXIETY DISORDER: ICD-10-CM

## 2020-01-01 DIAGNOSIS — K21.9 GASTROESOPHAGEAL REFLUX DISEASE WITHOUT ESOPHAGITIS: Chronic | ICD-10-CM

## 2020-01-01 DIAGNOSIS — G89.29 CHRONIC MIDLINE LOW BACK PAIN WITHOUT SCIATICA: Chronic | ICD-10-CM

## 2020-01-01 DIAGNOSIS — M54.50 CHRONIC MIDLINE LOW BACK PAIN WITHOUT SCIATICA: ICD-10-CM

## 2020-01-01 DIAGNOSIS — K21.9 GASTROESOPHAGEAL REFLUX DISEASE WITHOUT ESOPHAGITIS: ICD-10-CM

## 2020-01-01 DIAGNOSIS — F41.1 GENERALIZED ANXIETY DISORDER: Chronic | ICD-10-CM

## 2020-01-01 DIAGNOSIS — E11.9 DIABETES MELLITUS WITHOUT COMPLICATION (HCC): ICD-10-CM

## 2020-01-01 DIAGNOSIS — G89.29 CHRONIC MIDLINE LOW BACK PAIN WITHOUT SCIATICA: ICD-10-CM

## 2020-01-01 DIAGNOSIS — I25.118 CORONARY ARTERY DISEASE OF NATIVE ARTERY OF NATIVE HEART WITH STABLE ANGINA PECTORIS (HCC): Chronic | ICD-10-CM

## 2020-01-01 DIAGNOSIS — I10 ESSENTIAL HYPERTENSION: Primary | ICD-10-CM

## 2020-01-01 DIAGNOSIS — I10 ESSENTIAL HYPERTENSION: Chronic | ICD-10-CM

## 2020-01-01 DIAGNOSIS — G47.00 INSOMNIA, UNSPECIFIED TYPE: Chronic | ICD-10-CM

## 2020-01-01 DIAGNOSIS — M54.50 CHRONIC MIDLINE LOW BACK PAIN WITHOUT SCIATICA: Chronic | ICD-10-CM

## 2020-01-01 DIAGNOSIS — S99.911A INJURY OF RIGHT ANKLE, INITIAL ENCOUNTER: ICD-10-CM

## 2020-01-01 DIAGNOSIS — E78.2 MIXED HYPERLIPIDEMIA: Primary | ICD-10-CM

## 2020-01-01 DIAGNOSIS — E53.8 B12 DEFICIENCY: ICD-10-CM

## 2020-01-01 DIAGNOSIS — I20.8 CHRONIC STABLE ANGINA (HCC): Chronic | ICD-10-CM

## 2020-01-01 DIAGNOSIS — E11.42 TYPE 2 DIABETES MELLITUS WITH DIABETIC POLYNEUROPATHY, WITHOUT LONG-TERM CURRENT USE OF INSULIN (HCC): ICD-10-CM

## 2020-01-01 DIAGNOSIS — N61.0 MASTITIS: Primary | ICD-10-CM

## 2020-01-01 DIAGNOSIS — Z00.00 MEDICARE ANNUAL WELLNESS VISIT, SUBSEQUENT: Primary | ICD-10-CM

## 2020-01-01 DIAGNOSIS — D64.9 ANEMIA, UNSPECIFIED TYPE: ICD-10-CM

## 2020-01-01 DIAGNOSIS — N18.30 CKD (CHRONIC KIDNEY DISEASE) STAGE 3, GFR 30-59 ML/MIN (HCC): ICD-10-CM

## 2020-01-01 DIAGNOSIS — E03.9 ACQUIRED HYPOTHYROIDISM: ICD-10-CM

## 2020-01-01 DIAGNOSIS — I20.9 ANGINA PECTORIS (HCC): Primary | ICD-10-CM

## 2020-01-01 DIAGNOSIS — E78.2 MIXED HYPERLIPIDEMIA: ICD-10-CM

## 2020-01-01 DIAGNOSIS — E03.9 ACQUIRED HYPOTHYROIDISM: Chronic | ICD-10-CM

## 2020-01-01 LAB
25(OH)D3 SERPL-MCNC: 32.1 NG/ML (ref 30–100)
6-ACETYLMORPHINE: NEGATIVE
6MAM SERPLBLD-MCNC: NOT DETECTED NG/MG CREAT
7-AMINOCLONAZEPAM UR: NOT DETECTED NG/MG CREAT
A-OH ALPRAZ/CREAT UR: 537 NG/MG CREAT
ALBUMIN SERPL-MCNC: 4.1 G/DL (ref 3.5–5)
ALBUMIN/GLOB SERPL: 1.3 G/DL (ref 1.1–1.8)
ALFENTANIL UR QL: NOT DETECTED NG/MG CREAT
ALP SERPL-CCNC: 59 U/L (ref 38–126)
ALPHA-HYDROXYMIDAZOLAM, URINE: NOT DETECTED NG/MG CREAT
ALPHA-HYDROXYTRIAZOLAM, URINE: NOT DETECTED NG/MG CREAT
ALT SERPL W P-5'-P-CCNC: 10 U/L
AMOBARBITAL UR: NOT DETECTED
AMPHET UR QL CFM: NOT DETECTED NG/MG CREAT
AMPHETAMINES UR QL SCN: NEGATIVE
ANION GAP SERPL CALCULATED.3IONS-SCNC: 8 MMOL/L (ref 5–15)
AST SERPL-CCNC: 19 U/L (ref 14–36)
BARBITAL UR QL CFM: NOT DETECTED
BARBITURATES UR QL SCN: NEGATIVE
BASOPHILS # BLD AUTO: 0.04 10*3/MM3 (ref 0–0.2)
BASOPHILS NFR BLD AUTO: 0.6 % (ref 0–1.5)
BENZODIAZ UR QL SCN: NORMAL
BENZOYLECGONINE UR: NOT DETECTED NG/MG CREAT
BILIRUB SERPL-MCNC: 0.3 MG/DL (ref 0.2–1.3)
BUN SERPL-MCNC: 22 MG/DL (ref 7–23)
BUN/CREAT SERPL: 17.1 (ref 7–25)
BUPRENORPHINE UR QL: NEGATIVE
BUPRENORPHINE UR QL: NOT DETECTED NG/MG CREAT
BUTABARBITAL UR QL: NOT DETECTED
BUTALBITAL UR QL: NOT DETECTED
CALCIUM SPEC-SCNC: 9.3 MG/DL (ref 8.4–10.2)
CANNABINOIDS UR QL CFM: NEGATIVE
CHLORIDE SERPL-SCNC: 107 MMOL/L (ref 101–112)
CHOLEST SERPL-MCNC: 314 MG/DL (ref 150–200)
CLONAZEPAM UR QL: NOT DETECTED NG/MG CREAT
CO2 SERPL-SCNC: 24 MMOL/L (ref 22–30)
COCAETHYLENE UR QL CFM: NOT DETECTED NG/MG CREAT
COCAINE UR QL CFM: NEGATIVE
CODEINE UR QL: NOT DETECTED NG/MG CREAT
CONV COCAINE, UR: NOT DETECTED NG/MG CREAT
CONV REPORT SUMMARY: NORMAL
CREAT 24H UR-MCNC: 93 MG/DL
CREAT SERPL-MCNC: 1.29 MG/DL (ref 0.52–1.04)
DEPRECATED RDW RBC AUTO: 42.9 FL (ref 37–54)
DESALKYLFLURAZ/CREAT UR: NOT DETECTED NG/MG CREAT
DESMETHYLFLUNITRAZEPAM: NOT DETECTED NG/MG CREAT
DIAZEPAM UR-MCNC: NOT DETECTED NG/MG CREAT
DIHYDROCODEINE UR: 326 NG/MG CREAT
EDDP SERPL QL: NOT DETECTED NG/MG CREAT
EOSINOPHIL # BLD AUTO: 0.15 10*3/MM3 (ref 0–0.4)
EOSINOPHIL NFR BLD AUTO: 2.2 % (ref 0.3–6.2)
ERYTHROCYTE [DISTWIDTH] IN BLOOD BY AUTOMATED COUNT: 12.9 % (ref 12.3–15.4)
ETHANOL SCREEN URINE (REF): NEGATIVE
ETHANOL UR-MCNC: NOT DETECTED G/DL
FENTANYL UR QL: NEGATIVE
FENTANYL+NORFENTANYL UR QL SCN: NOT DETECTED NG/MG CREAT
FERRITIN SERPL-MCNC: 434 NG/ML (ref 13–150)
FLUNITRAZEPAM SERPLBLD-MCNC: NOT DETECTED NG/MG CREAT
FOLATE SERPL-MCNC: 7.05 NG/ML (ref 4.78–24.2)
GFR SERPL CREATININE-BSD FRML MDRD: 39 ML/MIN/1.73 (ref 39–90)
GLOBULIN UR ELPH-MCNC: 3.2 GM/DL (ref 2.3–3.5)
GLUCOSE SERPL-MCNC: 60 MG/DL (ref 70–99)
HBA1C MFR BLD: 4.7 % (ref 4.8–5.6)
HCT VFR BLD AUTO: 32.4 % (ref 34–46.6)
HDLC SERPL-MCNC: 35 MG/DL (ref 40–59)
HGB BLD-MCNC: 11.1 G/DL (ref 12–15.9)
HYDROCODONE UR QL: 904 NG/MG CREAT
HYDROMORPHONE UR QL: 604 NG/MG CREAT
IRON 24H UR-MRATE: 70 MCG/DL (ref 37–145)
IRON SATN MFR SERPL: 29 % (ref 20–50)
LDLC SERPL CALC-MCNC: 214 MG/DL
LDLC/HDLC SERPL: 6.12 {RATIO} (ref 0–3.22)
LEVEL OF DETECTION:: NORMAL
LORAZEPAM UR-MCNC: NOT DETECTED NG/MG CREAT
LORAZEPAM/CREAT UR: 205 NG/MG CREAT
LYMPHOCYTES # BLD AUTO: 2.91 10*3/MM3 (ref 0.7–3.1)
LYMPHOCYTES NFR BLD AUTO: 42.2 % (ref 19.6–45.3)
MCH RBC QN AUTO: 32.3 PG (ref 26.6–33)
MCHC RBC AUTO-ENTMCNC: 34.3 G/DL (ref 31.5–35.7)
MCV RBC AUTO: 94.2 FL (ref 79–97)
MDA SERPLBLD-MCNC: NOT DETECTED NG/MG CREAT
MDMA UR QL SCN: NOT DETECTED NG/MG CREAT
MEPHOBARBITAL UR QL CFM: NOT DETECTED
METHADONE BLD QL SCN: NEGATIVE
METHADONE, URINE: NOT DETECTED NG/MG CREAT
METHAMPHETAMINE UR: NOT DETECTED NG/MG CREAT
MIDAZOLAM UR-MCNC: NOT DETECTED NG/MG CREAT
MONOCYTES # BLD AUTO: 0.6 10*3/MM3 (ref 0.1–0.9)
MONOCYTES NFR BLD AUTO: 8.7 % (ref 5–12)
MORPHINE UR QL: NOT DETECTED NG/MG CREAT
N-DESMETHYLTRAMADOL, U: NOT DETECTED NG/MG CREAT
NARCOTICS UR: NEGATIVE
NEUTROPHILS NFR BLD AUTO: 3.2 10*3/MM3 (ref 1.7–7)
NEUTROPHILS NFR BLD AUTO: 46.3 % (ref 42.7–76)
NORBUPRENORPHINE SERPLBLD-MCNC: NOT DETECTED NG/MG CREAT
NORCODEINE UR-MCNC: NOT DETECTED NG/MG CREAT
NORDIAZEPAM SERPL CFM-MCNC: NOT DETECTED NG/MG CREAT
NORFENTANYL UR: NOT DETECTED NG/MG CREAT
NORMORPHINE: NOT DETECTED NG/MG CREAT
NOROXYMORPHONE: NOT DETECTED NG/MG CREAT
O-DESMETHYLTRAMADOL, UR: NOT DETECTED NG/MG CREAT
OPIATES UR QL CFM: NORMAL
OPIATES, URINE, NORHYDROCODONE: 1743 NG/MG CREAT
OPIATES, URINE, NOROXYCODONE: NOT DETECTED NG/MG CREAT
OXAZEPAM UR QL: NOT DETECTED NG/MG CREAT
OXYCODONE UR QL: NEGATIVE
OXYCODONE UR: NOT DETECTED NG/MG CREAT
OXYMORPHONE UR: NOT DETECTED NG/MG CREAT
PENTOBARBITAL UR: NOT DETECTED
PHENOBARB UR QL: NOT DETECTED
PLATELET # BLD AUTO: 192 10*3/MM3 (ref 140–450)
PMV BLD AUTO: 10 FL (ref 6–12)
POTASSIUM SERPL-SCNC: 4.7 MMOL/L (ref 3.4–5)
PROT SERPL-MCNC: 7.3 G/DL (ref 6.3–8.6)
RBC # BLD AUTO: 3.44 10*6/MM3 (ref 3.77–5.28)
RETICS # AUTO: 0.05 10*6/MM3 (ref 0.02–0.13)
RETICS/RBC NFR AUTO: 1.53 % (ref 0.7–1.9)
SECOBARBITAL UR QL: NOT DETECTED
SODIUM SERPL-SCNC: 139 MMOL/L (ref 137–145)
SUFENTANIL UR QL: NOT DETECTED NG/MG CREAT
T3 SERPL-MCNC: 82.6 NG/DL (ref 80–200)
T4 FREE SERPL-MCNC: 1.4 NG/DL (ref 0.93–1.7)
TAPENTADOL SERPLBLD-MCNC: NEGATIVE NG/ML
TAPENTADOL UR-MCNC: NOT DETECTED NG/MG CREAT
TEMAZEPAM UR QL CFM: NOT DETECTED NG/MG CREAT
THC UR CFM-MCNC: NOT DETECTED NG/MG CREAT
THIOPENTAL UR QL CFM: NOT DETECTED
TIBC SERPL-MCNC: 244 MCG/DL (ref 298–536)
TRAMADOL UR: NOT DETECTED NG/MG CREAT
TRANSFERRIN SERPL-MCNC: 164 MG/DL (ref 200–360)
TRIGL SERPL-MCNC: 324 MG/DL
TSH SERPL DL<=0.05 MIU/L-ACNC: 1.51 UIU/ML (ref 0.27–4.2)
VIT B12 BLD-MCNC: 384 PG/ML (ref 211–946)
VLDLC SERPL-MCNC: 64.8 MG/DL
WBC # BLD AUTO: 6.9 10*3/MM3 (ref 3.4–10.8)

## 2020-01-01 PROCEDURE — 80053 COMPREHEN METABOLIC PANEL: CPT | Performed by: NURSE PRACTITIONER

## 2020-01-01 PROCEDURE — 80061 LIPID PANEL: CPT | Performed by: NURSE PRACTITIONER

## 2020-01-01 PROCEDURE — G0439 PPPS, SUBSEQ VISIT: HCPCS | Performed by: NURSE PRACTITIONER

## 2020-01-01 PROCEDURE — 82746 ASSAY OF FOLIC ACID SERUM: CPT | Performed by: NURSE PRACTITIONER

## 2020-01-01 PROCEDURE — 96372 THER/PROPH/DIAG INJ SC/IM: CPT | Performed by: NURSE PRACTITIONER

## 2020-01-01 PROCEDURE — G2025 DIS SITE TELE SVCS RHC/FQHC: HCPCS | Performed by: NURSE PRACTITIONER

## 2020-01-01 PROCEDURE — 83540 ASSAY OF IRON: CPT | Performed by: NURSE PRACTITIONER

## 2020-01-01 PROCEDURE — 84439 ASSAY OF FREE THYROXINE: CPT | Performed by: NURSE PRACTITIONER

## 2020-01-01 PROCEDURE — 85045 AUTOMATED RETICULOCYTE COUNT: CPT | Performed by: NURSE PRACTITIONER

## 2020-01-01 PROCEDURE — 82306 VITAMIN D 25 HYDROXY: CPT | Performed by: NURSE PRACTITIONER

## 2020-01-01 PROCEDURE — 85025 COMPLETE CBC W/AUTO DIFF WBC: CPT | Performed by: NURSE PRACTITIONER

## 2020-01-01 PROCEDURE — 36415 COLL VENOUS BLD VENIPUNCTURE: CPT | Performed by: NURSE PRACTITIONER

## 2020-01-01 PROCEDURE — 84443 ASSAY THYROID STIM HORMONE: CPT | Performed by: NURSE PRACTITIONER

## 2020-01-01 PROCEDURE — 83036 HEMOGLOBIN GLYCOSYLATED A1C: CPT | Performed by: NURSE PRACTITIONER

## 2020-01-01 PROCEDURE — 80307 DRUG TEST PRSMV CHEM ANLYZR: CPT | Performed by: NURSE PRACTITIONER

## 2020-01-01 PROCEDURE — 82607 VITAMIN B-12: CPT | Performed by: NURSE PRACTITIONER

## 2020-01-01 PROCEDURE — 84480 ASSAY TRIIODOTHYRONINE (T3): CPT | Performed by: NURSE PRACTITIONER

## 2020-01-01 PROCEDURE — 82728 ASSAY OF FERRITIN: CPT | Performed by: NURSE PRACTITIONER

## 2020-01-01 PROCEDURE — G0481 DRUG TEST DEF 8-14 CLASSES: HCPCS | Performed by: NURSE PRACTITIONER

## 2020-01-01 PROCEDURE — 84466 ASSAY OF TRANSFERRIN: CPT | Performed by: NURSE PRACTITIONER

## 2020-01-01 RX ORDER — ALPRAZOLAM 1 MG/1
1 TABLET ORAL NIGHTLY PRN
Qty: 30 TABLET | Refills: 0 | Status: SHIPPED | OUTPATIENT
Start: 2020-01-01 | End: 2020-01-01 | Stop reason: SDUPTHER

## 2020-01-01 RX ORDER — CHLORAL HYDRATE 500 MG
1000 CAPSULE ORAL 2 TIMES DAILY WITH MEALS
Qty: 60 CAPSULE | Refills: 0
Start: 2020-01-01

## 2020-01-01 RX ORDER — HYDROCODONE BITARTRATE AND ACETAMINOPHEN 7.5; 325 MG/1; MG/1
1 TABLET ORAL 2 TIMES DAILY PRN
Qty: 60 TABLET | Refills: 0 | Status: SHIPPED | OUTPATIENT
Start: 2020-01-01 | End: 2020-01-01 | Stop reason: SDUPTHER

## 2020-01-01 RX ORDER — LEVOTHYROXINE SODIUM 0.1 MG/1
100 TABLET ORAL DAILY
Qty: 90 TABLET | Refills: 1 | Status: SHIPPED | OUTPATIENT
Start: 2020-01-01 | End: 2021-01-01 | Stop reason: SDUPTHER

## 2020-01-01 RX ORDER — DOXEPIN HYDROCHLORIDE 100 MG/1
100 CAPSULE ORAL NIGHTLY
Qty: 90 CAPSULE | Refills: 1 | Status: SHIPPED | OUTPATIENT
Start: 2020-01-01 | End: 2021-01-01 | Stop reason: SDUPTHER

## 2020-01-01 RX ORDER — LANCETS 30 GAUGE
EACH MISCELLANEOUS
Qty: 100 EACH | Refills: 3 | Status: SHIPPED | OUTPATIENT
Start: 2020-01-01

## 2020-01-01 RX ORDER — METOPROLOL SUCCINATE 50 MG/1
50 TABLET, EXTENDED RELEASE ORAL DAILY
Qty: 90 TABLET | Refills: 1 | Status: SHIPPED | OUTPATIENT
Start: 2020-01-01 | End: 2021-01-01 | Stop reason: SDUPTHER

## 2020-01-01 RX ORDER — ISOSORBIDE MONONITRATE 30 MG/1
30 TABLET, EXTENDED RELEASE ORAL DAILY
Qty: 90 TABLET | Refills: 1 | Status: SHIPPED | OUTPATIENT
Start: 2020-01-01 | End: 2021-01-01 | Stop reason: SDUPTHER

## 2020-01-01 RX ORDER — FAMOTIDINE 20 MG/1
20 TABLET, FILM COATED ORAL 2 TIMES DAILY PRN
Qty: 180 TABLET | Refills: 1 | Status: SHIPPED | OUTPATIENT
Start: 2020-01-01 | End: 2020-01-01 | Stop reason: SDUPTHER

## 2020-01-01 RX ORDER — ALPRAZOLAM 1 MG/1
1 TABLET ORAL NIGHTLY PRN
Qty: 30 TABLET | Refills: 0 | Status: SHIPPED | OUTPATIENT
Start: 2020-01-01 | End: 2021-01-01 | Stop reason: SDUPTHER

## 2020-01-01 RX ORDER — HYDROCODONE BITARTRATE AND ACETAMINOPHEN 7.5; 325 MG/1; MG/1
1 TABLET ORAL 2 TIMES DAILY PRN
Qty: 60 TABLET | Refills: 0 | Status: SHIPPED | OUTPATIENT
Start: 2020-01-01 | End: 2021-01-01 | Stop reason: SDUPTHER

## 2020-01-01 RX ORDER — BLOOD-GLUCOSE METER
KIT MISCELLANEOUS
Qty: 1 EACH | Refills: 0 | Status: SHIPPED | OUTPATIENT
Start: 2020-01-01

## 2020-01-01 RX ORDER — KETOROLAC TROMETHAMINE 30 MG/ML
60 INJECTION, SOLUTION INTRAMUSCULAR; INTRAVENOUS ONCE
Status: COMPLETED | OUTPATIENT
Start: 2020-01-01 | End: 2020-01-01

## 2020-01-01 RX ORDER — FAMOTIDINE 20 MG/1
20 TABLET, FILM COATED ORAL 2 TIMES DAILY PRN
Qty: 180 TABLET | Refills: 1 | Status: SHIPPED | OUTPATIENT
Start: 2020-01-01 | End: 2021-01-01 | Stop reason: SDUPTHER

## 2020-01-01 RX ORDER — NITROGLYCERIN 0.4 MG/1
0.4 TABLET SUBLINGUAL
Qty: 75 TABLET | Refills: 1 | Status: SHIPPED | OUTPATIENT
Start: 2020-01-01 | End: 2021-01-01 | Stop reason: SDUPTHER

## 2020-01-01 RX ORDER — SULFAMETHOXAZOLE AND TRIMETHOPRIM 800; 160 MG/1; MG/1
1 TABLET ORAL 2 TIMES DAILY
Qty: 20 TABLET | Refills: 0 | Status: SHIPPED | OUTPATIENT
Start: 2020-01-01 | End: 2020-01-01

## 2020-01-01 RX ORDER — CLOPIDOGREL BISULFATE 75 MG/1
75 TABLET ORAL DAILY
Qty: 90 TABLET | Refills: 1 | Status: SHIPPED | OUTPATIENT
Start: 2020-01-01 | End: 2021-01-01 | Stop reason: SDUPTHER

## 2020-01-01 RX ORDER — RANOLAZINE 500 MG/1
500 TABLET, EXTENDED RELEASE ORAL EVERY 12 HOURS SCHEDULED
Qty: 180 TABLET | Refills: 1 | Status: SHIPPED | OUTPATIENT
Start: 2020-01-01 | End: 2021-01-01 | Stop reason: SDUPTHER

## 2020-01-01 RX ORDER — TRIAMCINOLONE ACETONIDE 40 MG/ML
80 INJECTION, SUSPENSION INTRA-ARTICULAR; INTRAMUSCULAR ONCE
Status: COMPLETED | OUTPATIENT
Start: 2020-01-01 | End: 2020-01-01

## 2020-01-01 RX ORDER — AMLODIPINE BESYLATE 10 MG/1
10 TABLET ORAL DAILY
Qty: 90 TABLET | Refills: 1 | Status: SHIPPED | OUTPATIENT
Start: 2020-01-01 | End: 2021-01-01 | Stop reason: SDUPTHER

## 2020-01-01 RX ORDER — LISINOPRIL 2.5 MG/1
2.5 TABLET ORAL DAILY
Qty: 90 TABLET | Refills: 1 | Status: SHIPPED | OUTPATIENT
Start: 2020-01-01 | End: 2021-01-01 | Stop reason: SDUPTHER

## 2020-01-01 RX ADMIN — KETOROLAC TROMETHAMINE 60 MG: 30 INJECTION, SOLUTION INTRAMUSCULAR; INTRAVENOUS at 15:51

## 2020-01-01 RX ADMIN — TRIAMCINOLONE ACETONIDE 80 MG: 40 INJECTION, SUSPENSION INTRA-ARTICULAR; INTRAMUSCULAR at 15:51

## 2020-01-08 DIAGNOSIS — E11.42 TYPE 2 DIABETES MELLITUS WITH DIABETIC POLYNEUROPATHY, WITHOUT LONG-TERM CURRENT USE OF INSULIN (HCC): ICD-10-CM

## 2020-01-09 ENCOUNTER — OFFICE VISIT (OUTPATIENT)
Dept: CARDIOLOGY | Facility: CLINIC | Age: 85
End: 2020-01-09

## 2020-01-09 VITALS
SYSTOLIC BLOOD PRESSURE: 130 MMHG | OXYGEN SATURATION: 93 % | DIASTOLIC BLOOD PRESSURE: 58 MMHG | WEIGHT: 164.4 LBS | BODY MASS INDEX: 30.25 KG/M2 | HEART RATE: 71 BPM | HEIGHT: 62 IN

## 2020-01-09 DIAGNOSIS — I73.9 PERIPHERAL ARTERIAL DISEASE (HCC): Primary | ICD-10-CM

## 2020-01-09 DIAGNOSIS — I35.0 NONRHEUMATIC AORTIC VALVE STENOSIS: ICD-10-CM

## 2020-01-09 DIAGNOSIS — I25.709 CORONARY ARTERY DISEASE INVOLVING CORONARY BYPASS GRAFT OF NATIVE HEART WITH ANGINA PECTORIS (HCC): ICD-10-CM

## 2020-01-09 DIAGNOSIS — I10 ESSENTIAL HYPERTENSION: ICD-10-CM

## 2020-01-09 DIAGNOSIS — E78.2 MIXED HYPERLIPIDEMIA: ICD-10-CM

## 2020-01-09 DIAGNOSIS — I34.0 NON-RHEUMATIC MITRAL REGURGITATION: ICD-10-CM

## 2020-01-09 PROCEDURE — 99214 OFFICE O/P EST MOD 30 MIN: CPT | Performed by: INTERNAL MEDICINE

## 2020-01-09 RX ORDER — GLIMEPIRIDE 1 MG/1
TABLET ORAL
Qty: 180 TABLET | Refills: 3 | Status: SHIPPED | OUTPATIENT
Start: 2020-01-09 | End: 2020-06-06 | Stop reason: SDUPTHER

## 2020-01-09 NOTE — PROGRESS NOTES
[facility]  OFFICE NOTE    Cardiovascular Medicine  Jh Palma M.D., RPVI         No referring provider defined for this encounter.    Thank you for asking me to see Tatyana Yeager for CAD.    History of Present Illness  This is a 85 y.o. female with:    1. Coronary artery disease involving 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 (CMS/HCC)    2. Peripheral arterial disease (CMS/[facility])    3. Essential hypertension    4. Mixed hyperlipidemia    5. Type 2 diabetes mellitus without complication, without long-term current use of insulin (CMS/HCC)    6. Chronic stable angina (CMS/[facility])          Chief complaint -angina     History of present [id number]old lady with history of coronary disease prior CABG, with chronic stable angina and  has been on medical therapy with Imdur, amlodipine. metorool and Ranexa but she has been taking only Ranexa as needed because of cost I asked her to take it at least twice a day. Patient and family has been reluctant for invasive evauation due to age and underlying CKD.   Was recently seen by PCP patient has been complaining of bilateral lower extremity sensation of coldness, and burning.  Her symptoms are vague she reports sudden onset of severe sensation of coldness in both of her legs below the knee and then at times she will have it throughout her body.  No claudication per se.  She has been having stable angina with activities of daily living and resolves with sublingual nitroglycerin.  No new complaints at this point.    01/09/2020  No acute issues since last visit.  Her chest pain has improved after taking her Ranexa twice a day  No lightheadedness or dizziness.  No loss of consciousness  She is denying any claudications.    Review of Systems - ROS  Constitution: Negative for weakness, weight gain and weight loss.   HENT: Negative for congestion.    Eyes: Negative for blurred vision.   Cardiovascular: As mentioned  above  Respiratory: Negative for cough and hemoptysis.    Endocrine: Negative for polydipsia and polyuria.   Hematologic/Lymphatic: Negative for bleeding problem. Does not bruise/bleed easily.   Skin: Negative for flushing.   Musculoskeletal: Negative for neck pain and stiffness.   Gastrointestinal: Negative for abdominal pain, diarrhea, jaundice, melena, nausea and vomiting.   Genitourinary: Negative for dysuria and hematuria.   Neurological: Negative for dizziness, focal weakness and numbness.   Psychiatric/Behavioral: Negative for altered mental status and depression.          All other systems were reviewed and were negative.    family history includes Cirrhosis in her mother; Coronary artery disease in an other family member; Diabetes in an other family member; Heart disease in an other family member; Throat cancer in her father.     reports that she has quit smoking. She has a 14.00 pack-year smoking history. She has never used smokeless tobacco. She reports that she does not drink alcohol or use drugs.    Allergies   Allergen Reactions   • Prilosec [Omeprazole] Other (See Comments)     Gives her cramps in legs    • Statins Other (See Comments)     Gives patient muscle and joint pain    • Flexeril [Cyclobenzaprine] Unknown (See Comments)     Leg spasms   • Zetia [Ezetimibe] Other (See Comments)     Leg cramps         Current Outpatient Medications:   •  ALPRAZolam (XANAX) 1 MG tablet, Take 1 tablet by mouth At Night As Needed for Anxiety., Disp: 30 tablet, Rfl: 0  •  amLODIPine (NORVASC) 10 MG tablet, Take 1 tablet by mouth Daily., Disp: 90 tablet, Rfl: 3  •  clopidogrel (PLAVIX) 75 MG tablet, Take 1 tablet by mouth Daily., Disp: 90 tablet, Rfl: 1  •  docusate sodium (COLACE) 250 MG capsule, Take 1 capsule by mouth Daily., Disp: 30 capsule, Rfl: 6  •  doxepin (SINEquan) 100 MG capsule, Take 1 capsule by mouth Every Night., Disp: 90 capsule, Rfl: 1  •  famotidine (PEPCID) 20 MG tablet, Take 1 tablet by mouth 2  "(Two) Times a Day As Needed for Heartburn., Disp: 180 tablet, Rfl: 1  •  ferrous sulfate 325 (65 FE) MG EC tablet, Take 1 tablet by mouth Daily With Breakfast., Disp: 90 tablet, Rfl: 0  •  glimepiride (AMARYL) 1 MG tablet, TAKE 1 TABLET TWICE DAILY, Disp: 180 tablet, Rfl: 3  •  glucose blood test strip, To test blood glucose 1 - 2 times a day, Disp: 100 each, Rfl: 3  •  glucose monitor monitoring kit, For diabetes mellitus, Disp: 1 each, Rfl: 0  •  HYDROcodone-acetaminophen (NORCO) 7.5-325 MG per tablet, Take 1 tablet by mouth 2 (Two) Times a Day As Needed for Moderate Pain ., Disp: 60 tablet, Rfl: 0  •  isosorbide mononitrate (IMDUR) 30 MG 24 hr tablet, Take 1 tablet by mouth Daily., Disp: 30 tablet, Rfl: 11  •  Lancets misc, To test blood glucose 1 -2 times a day, Disp: 100 each, Rfl: 3  •  levothyroxine (SYNTHROID, LEVOTHROID) 88 MCG tablet, Take 1 tablet by mouth Daily., Disp: 90 tablet, Rfl: 1  •  lisinopril (PRINIVIL,ZESTRIL) 2.5 MG tablet, Take 1 tablet by mouth Daily., Disp: 90 tablet, Rfl: 1  •  metoprolol succinate XL (TOPROL-XL) 50 MG 24 hr tablet, Take 1 tablet by mouth Daily., Disp: 90 tablet, Rfl: 1  •  nitroglycerin (NITROSTAT) 0.3 MG SL tablet, 1 under the tongue as needed for angina, may repeat q5mins for up three doses (Patient taking differently: Take 0.4 mg by mouth Every 5 (Five) Minutes As Needed for Chest Pain. 1 under the tongue as needed for angina, may repeat q5mins for up three doses), Disp: 100 tablet, Rfl: 11  •  Omega-3 Fatty Acids (FISH OIL) 1000 MG capsule capsule, Take 2 capsules by mouth Daily With Breakfast., Disp: 270 capsule, Rfl: 1  •  ranolazine (RANEXA) 500 MG 12 hr tablet, Take 1 tablet by mouth Every 12 (Twelve) Hours., Disp: 180 tablet, Rfl: 0    Physical Exam:  Vitals:    01/09/20 1329   BP: 130/58   BP Location: Left arm   Patient Position: Sitting   Cuff Size: Adult   Pulse: 71   SpO2: 93%   Weight: 74.6 kg (164 lb 6.4 oz)   Height: 157.5 cm (62.01\")   PainSc: 0-No pain "     Current Pain Level: none  Pulse Ox: Normal  on room air  General: alert, appears stated age and cooperative     Body Habitus: well-nourished    HEENT: Head: Normocephalic, no lesions, without obvious abnormality. No arcus senilis, xanthelasma or xanthomas.    Neuro: alert, oriented x3  Pulses: 2+ and symmetric  JVP: Volume/Pulsation: Normal.  Normal waveforms.   Appropriate inspiratory decrease.  No Kussmaul's. No Benjamin's.   Carotid Exam: no bruit normal pulsation bilaterally   Carotid Volume: normal.     Respirations: no increased work of breathing   Chest:  Normal    Pulmonary:Normal   Precordium: Normal impulses. P2 is not palpable.  RV Heave: absent  LV Heave: absent  [place]:  normal size and placement  Palpable S4: absent.  Heart rate: normal    Heart Rhythm: regular     Heart Sounds: S1: normal  S2: Absent.  S3: absent   S4: absent  Harsh ejection systolic murmur audible in aortic area radiating to carotids.  S2 not audible.  Opening Snap: absent    Pericardial Rub:  Absent: .    Abdomen:   Appearance: normal .  Palpation: Soft, non-tender to palpation, bowel sounds positive in all four quadrants; no guarding or rebound tenderness  Extremity: no edema.   LE Skin: no rashes  LE Hair:  normal  LE Pulses: well perfused with normal pulses in the distal extremities  Pallor on elevation: Absent. Rubor on dependency: None      DATA REVIEWED:       ECG/EMG Results (all)     None        ---------------------------------------------------  TTE/RHIANNON:  Results for orders placed during the hospital encounter of 10/04/19   Adult Transthoracic Echo Complete W/ Cont if Necessary Per Protocol    Narrative · Left ventricular systolic function is normal.  · Estimated EF appears to be in the range of 56 - 60%.  · Mild mitral valve regurgitation is present  · Mild mitral valve stenosis is present  · There is severe calcification of the aortic valve mainly affecting the   non, left and right coronary cusp(s).  · Severe aortic  valve stenosis is present.  · Mean gradient/Peak gradient of 40/67mmHg  · NENA by continuity is 0.59cm2 findings are c/w severe aortic stenosis  · Left atrial cavity size is moderately dilated.  · Left ventricular diastolic dysfunction (grade I a) consistent with   impaired relaxation.            --------------------------------------------------------------------------------------------------  LABS:     The CVD Risk score (Beab et al., 2008) failed to calculate for the following reasons:    The 2008 CVD risk score is only valid for ages 30 to 74    The patient has a prior MI, stroke, CHF, or peripheral vascular disease diagnosis         Lab Results   Component Value Date    GLUCOSE 102 (H) 06/20/2019    BUN 25 (H) 06/20/2019    CREATININE 1.29 (H) 06/20/2019    EGFRIFNONA 39 06/20/2019    BCR 19.4 06/20/2019    K 4.7 06/20/2019    CO2 27.0 06/20/2019    CALCIUM 10.0 06/20/2019    ALBUMIN 4.40 06/20/2019    AST 20 06/20/2019    ALT 13 06/20/2019     Lab Results   Component Value Date    WBC 11.73 (H) 10/07/2019    HGB 11.7 (L) 10/07/2019    HCT 34.5 10/07/2019    MCV 94.8 10/07/2019     10/07/2019     Lab Results   Component Value Date    CHOL 323 (H) 08/14/2019    CHLPL 258 (H) 09/17/2015    TRIG 249 (H) 08/14/2019    HDL 38 (L) 08/14/2019     (H) 08/14/2019     Lab Results   Component Value Date    TSH 1.920 06/20/2019     Lab Results   Component Value Date    TROPONINI 1.870 (C) 08/30/2018     Lab Results   Component Value Date    HGBA1C 5.54 06/20/2019     No results found for: DDIMER  Lab Results   Component Value Date    ALT 13 06/20/2019     Lab Results   Component Value Date    HGBA1C 5.54 06/20/2019    HGBA1C 5.7 (H) 12/14/2018    HGBA1C 5.6 11/15/2017     Lab Results   Component Value Date    MICROALBUR 2.4 12/27/2019    CREATININE 1.29 (H) 06/20/2019     Lab Results   Component Value Date    IRON 69 12/27/2019    TIBC 250 (L) 12/27/2019     Lab Results   Component Value Date    INR 1.0  09/16/2015    PROTIME 12.9 09/16/2015       Assessment/Plan     1. CAD with prior history of CABG with chronic angina, on Ranexa, Imdur.  She is on antiplatelet therapy with aspirin and Plavix.  She continues to have chronic stable angina. Her symptoms have improved after taking her Ranexa twice daily.  We will cut back her Imdur to 30 mg in the setting of his aortic stenosis.  I cautioned her for using sublingual nitroglycerin and strength lower extremities.  I am increasing her amlodipine to 5 mg.  I had a long discussion with the patient and the family and they are very reluctant for invasive cardiac catheterization at this point due to her age, CKD and the fact that she was in the hospital for a long time after her previous procedure.  Contribution from aortic stenosis or chest pain as well.    2. Diabetes on oral agents.     3. Hypertension on amlodipine, Imdur, Toprol-XL.       4. Hyperlipidemia on Zetia as she cannot tolerate any of the statins.  Last visit we tried to have a pitavastatin however patient could not tolerate.    5. Valvular abnormality:  Repeat echo with severe aortic stenosis.  I long discussion with the patient for evaluation for TAVR.  Patient wants to discuss with the family and will let me know.    6. PAD  KALEN showed moderate reduced perfusion.  Denies any claudication at this point.  Continue ASA/plavix  Continue statin    7. Venous reflux:  Vebnous study without evidence of DVT but she did have significant reflux in superficial and deep venous system.   Will start her on compression stockings     Prevention:    Patient's Body mass index is 30.06 kg/m². BMI is above normal parameters. Recommendations include: exercise counseling and nutrition counseling.      Tatyana Yeager is not a smoker    AAA Screening:   Not needed    Return in about 6 months (around 7/9/2020).          This document has been electronically signed by Jh Palma MD on January 9, 2020 1:47 PM

## 2020-01-14 ENCOUNTER — TELEPHONE (OUTPATIENT)
Dept: FAMILY MEDICINE CLINIC | Facility: CLINIC | Age: 85
End: 2020-01-14

## 2020-01-14 DIAGNOSIS — I20.8 CHRONIC STABLE ANGINA (HCC): Primary | ICD-10-CM

## 2020-01-14 RX ORDER — NITROGLYCERIN 0.4 MG/1
0.4 TABLET SUBLINGUAL
Qty: 60 TABLET | Refills: 3 | Status: SHIPPED | OUTPATIENT
Start: 2020-01-14 | End: 2020-03-19 | Stop reason: SDUPTHER

## 2020-01-14 RX ORDER — NITROGLYCERIN 0.4 MG/1
0.4 TABLET SUBLINGUAL
Qty: 20 TABLET | Refills: 11 | Status: SHIPPED | OUTPATIENT
Start: 2020-01-14 | End: 2020-01-14 | Stop reason: SDUPTHER

## 2020-01-16 DIAGNOSIS — I20.8 CHRONIC STABLE ANGINA (HCC): ICD-10-CM

## 2020-01-16 RX ORDER — RANOLAZINE 500 MG/1
500 TABLET, EXTENDED RELEASE ORAL EVERY 12 HOURS SCHEDULED
Qty: 180 TABLET | Refills: 0 | Status: SHIPPED | OUTPATIENT
Start: 2020-01-16 | End: 2020-03-19 | Stop reason: SDUPTHER

## 2020-01-31 DIAGNOSIS — G89.29 CHRONIC MIDLINE LOW BACK PAIN WITHOUT SCIATICA: ICD-10-CM

## 2020-01-31 DIAGNOSIS — F41.1 GENERALIZED ANXIETY DISORDER: ICD-10-CM

## 2020-01-31 DIAGNOSIS — M54.50 CHRONIC MIDLINE LOW BACK PAIN WITHOUT SCIATICA: ICD-10-CM

## 2020-01-31 RX ORDER — HYDROCODONE BITARTRATE AND ACETAMINOPHEN 7.5; 325 MG/1; MG/1
1 TABLET ORAL 2 TIMES DAILY PRN
Qty: 60 TABLET | Refills: 0 | Status: SHIPPED | OUTPATIENT
Start: 2020-01-31 | End: 2020-01-31 | Stop reason: SDUPTHER

## 2020-01-31 RX ORDER — ALPRAZOLAM 1 MG/1
1 TABLET ORAL NIGHTLY PRN
Qty: 30 TABLET | Refills: 0 | Status: SHIPPED | OUTPATIENT
Start: 2020-01-31 | End: 2020-01-31 | Stop reason: SDUPTHER

## 2020-01-31 RX ORDER — ALPRAZOLAM 1 MG/1
1 TABLET ORAL NIGHTLY PRN
Qty: 30 TABLET | Refills: 0 | Status: SHIPPED | OUTPATIENT
Start: 2020-01-31 | End: 2020-03-03 | Stop reason: SDUPTHER

## 2020-01-31 RX ORDER — HYDROCODONE BITARTRATE AND ACETAMINOPHEN 7.5; 325 MG/1; MG/1
1 TABLET ORAL 2 TIMES DAILY PRN
Qty: 60 TABLET | Refills: 0 | Status: SHIPPED | OUTPATIENT
Start: 2020-01-31 | End: 2020-03-03 | Stop reason: SDUPTHER

## 2020-03-03 DIAGNOSIS — F41.1 GENERALIZED ANXIETY DISORDER: ICD-10-CM

## 2020-03-03 DIAGNOSIS — G89.29 CHRONIC MIDLINE LOW BACK PAIN WITHOUT SCIATICA: ICD-10-CM

## 2020-03-03 DIAGNOSIS — M54.50 CHRONIC MIDLINE LOW BACK PAIN WITHOUT SCIATICA: ICD-10-CM

## 2020-03-03 RX ORDER — ALPRAZOLAM 1 MG/1
1 TABLET ORAL NIGHTLY PRN
Qty: 30 TABLET | Refills: 0 | Status: SHIPPED | OUTPATIENT
Start: 2020-03-03 | End: 2020-03-30 | Stop reason: SDUPTHER

## 2020-03-03 RX ORDER — HYDROCODONE BITARTRATE AND ACETAMINOPHEN 7.5; 325 MG/1; MG/1
1 TABLET ORAL 2 TIMES DAILY PRN
Qty: 60 TABLET | Refills: 0 | Status: SHIPPED | OUTPATIENT
Start: 2020-03-03 | End: 2020-03-30 | Stop reason: SDUPTHER

## 2020-03-13 DIAGNOSIS — E11.42 TYPE 2 DIABETES MELLITUS WITH DIABETIC POLYNEUROPATHY, WITHOUT LONG-TERM CURRENT USE OF INSULIN (HCC): Primary | ICD-10-CM

## 2020-03-13 DIAGNOSIS — E61.1 IRON DEFICIENCY: ICD-10-CM

## 2020-03-13 DIAGNOSIS — M89.9 DISORDER OF BONE, UNSPECIFIED: ICD-10-CM

## 2020-03-13 DIAGNOSIS — I10 ESSENTIAL HYPERTENSION: ICD-10-CM

## 2020-03-13 DIAGNOSIS — E78.2 MIXED HYPERLIPIDEMIA: ICD-10-CM

## 2020-03-13 DIAGNOSIS — Z51.81 ENCOUNTER FOR THERAPEUTIC DRUG LEVEL MONITORING: ICD-10-CM

## 2020-03-13 DIAGNOSIS — L30.1 ECZEMA, DYSHIDROTIC: ICD-10-CM

## 2020-03-13 DIAGNOSIS — R79.89 ABNORMAL CBC MEASUREMENT: ICD-10-CM

## 2020-03-13 DIAGNOSIS — E03.9 ACQUIRED HYPOTHYROIDISM: ICD-10-CM

## 2020-03-19 DIAGNOSIS — E03.9 ACQUIRED HYPOTHYROIDISM: ICD-10-CM

## 2020-03-19 DIAGNOSIS — K21.9 GASTROESOPHAGEAL REFLUX DISEASE WITHOUT ESOPHAGITIS: ICD-10-CM

## 2020-03-19 DIAGNOSIS — I10 ESSENTIAL HYPERTENSION: ICD-10-CM

## 2020-03-19 DIAGNOSIS — I20.8 CHRONIC STABLE ANGINA (HCC): ICD-10-CM

## 2020-03-19 DIAGNOSIS — G47.00 INSOMNIA, UNSPECIFIED TYPE: ICD-10-CM

## 2020-03-20 ENCOUNTER — LAB (OUTPATIENT)
Dept: LAB | Facility: OTHER | Age: 85
End: 2020-03-20

## 2020-03-20 DIAGNOSIS — L30.1 ECZEMA, DYSHIDROTIC: ICD-10-CM

## 2020-03-20 DIAGNOSIS — E03.9 ACQUIRED HYPOTHYROIDISM: ICD-10-CM

## 2020-03-20 DIAGNOSIS — Z51.81 ENCOUNTER FOR THERAPEUTIC DRUG LEVEL MONITORING: ICD-10-CM

## 2020-03-20 DIAGNOSIS — M89.9 DISORDER OF BONE, UNSPECIFIED: ICD-10-CM

## 2020-03-20 DIAGNOSIS — I10 ESSENTIAL HYPERTENSION: ICD-10-CM

## 2020-03-20 DIAGNOSIS — E11.42 TYPE 2 DIABETES MELLITUS WITH DIABETIC POLYNEUROPATHY, WITHOUT LONG-TERM CURRENT USE OF INSULIN (HCC): ICD-10-CM

## 2020-03-20 DIAGNOSIS — E78.2 MIXED HYPERLIPIDEMIA: Primary | ICD-10-CM

## 2020-03-20 DIAGNOSIS — E78.2 MIXED HYPERLIPIDEMIA: ICD-10-CM

## 2020-03-20 DIAGNOSIS — R79.89 ABNORMAL CBC MEASUREMENT: ICD-10-CM

## 2020-03-20 LAB
25(OH)D3 SERPL-MCNC: 31 NG/ML (ref 30–100)
ALBUMIN SERPL-MCNC: 4 G/DL (ref 3.5–5)
ALBUMIN/GLOB SERPL: 1.3 G/DL (ref 1.1–1.8)
ALP SERPL-CCNC: 60 U/L (ref 38–126)
ALT SERPL W P-5'-P-CCNC: 10 U/L
ANION GAP SERPL CALCULATED.3IONS-SCNC: 10 MMOL/L (ref 5–15)
AST SERPL-CCNC: 18 U/L (ref 14–36)
BASOPHILS # BLD AUTO: 0.05 10*3/MM3 (ref 0–0.2)
BASOPHILS NFR BLD AUTO: 0.7 % (ref 0–1.5)
BILIRUB SERPL-MCNC: 0.2 MG/DL (ref 0.2–1.3)
BUN BLD-MCNC: 26 MG/DL (ref 7–23)
BUN/CREAT SERPL: 17.7 (ref 7–25)
CALCIUM SPEC-SCNC: 9.9 MG/DL (ref 8.4–10.2)
CHLORIDE SERPL-SCNC: 106 MMOL/L (ref 101–112)
CHOLEST SERPL-MCNC: 281 MG/DL (ref 150–200)
CO2 SERPL-SCNC: 26 MMOL/L (ref 22–30)
CREAT BLD-MCNC: 1.47 MG/DL (ref 0.52–1.04)
DEPRECATED RDW RBC AUTO: 41.4 FL (ref 37–54)
EOSINOPHIL # BLD AUTO: 0.21 10*3/MM3 (ref 0–0.4)
EOSINOPHIL NFR BLD AUTO: 2.8 % (ref 0.3–6.2)
ERYTHROCYTE [DISTWIDTH] IN BLOOD BY AUTOMATED COUNT: 12.5 % (ref 12.3–15.4)
FOLATE SERPL-MCNC: 12.8 NG/ML (ref 4.78–24.2)
GFR SERPL CREATININE-BSD FRML MDRD: 34 ML/MIN/1.73 (ref 39–90)
GLOBULIN UR ELPH-MCNC: 3.2 GM/DL (ref 2.3–3.5)
GLUCOSE BLD-MCNC: 75 MG/DL (ref 70–99)
HBA1C MFR BLD: 5.34 % (ref 4.8–5.6)
HCT VFR BLD AUTO: 34.2 % (ref 34–46.6)
HDLC SERPL-MCNC: 39 MG/DL (ref 40–59)
HGB BLD-MCNC: 11.5 G/DL (ref 12–15.9)
IRON 24H UR-MRATE: 61 MCG/DL (ref 37–145)
IRON SATN MFR SERPL: 25 % (ref 20–50)
LDLC SERPL CALC-MCNC: 189 MG/DL
LDLC/HDLC SERPL: 4.85 {RATIO} (ref 0–3.22)
LYMPHOCYTES # BLD AUTO: 3.74 10*3/MM3 (ref 0.7–3.1)
LYMPHOCYTES NFR BLD AUTO: 49.9 % (ref 19.6–45.3)
MCH RBC QN AUTO: 31.5 PG (ref 26.6–33)
MCHC RBC AUTO-ENTMCNC: 33.6 G/DL (ref 31.5–35.7)
MCV RBC AUTO: 93.7 FL (ref 79–97)
MONOCYTES # BLD AUTO: 0.67 10*3/MM3 (ref 0.1–0.9)
MONOCYTES NFR BLD AUTO: 8.9 % (ref 5–12)
NEUTROPHILS # BLD AUTO: 2.82 10*3/MM3 (ref 1.7–7)
NEUTROPHILS NFR BLD AUTO: 37.7 % (ref 42.7–76)
PLATELET # BLD AUTO: 230 10*3/MM3 (ref 140–450)
PMV BLD AUTO: 10 FL (ref 6–12)
POTASSIUM BLD-SCNC: 3.8 MMOL/L (ref 3.4–5)
PROT SERPL-MCNC: 7.2 G/DL (ref 6.3–8.6)
RBC # BLD AUTO: 3.65 10*6/MM3 (ref 3.77–5.28)
SODIUM BLD-SCNC: 142 MMOL/L (ref 137–145)
T3 SERPL-MCNC: 83.7 NG/DL (ref 80–200)
T4 FREE SERPL-MCNC: 1.15 NG/DL (ref 0.93–1.7)
TIBC SERPL-MCNC: 246 MCG/DL (ref 298–536)
TRANSFERRIN SERPL-MCNC: 165 MG/DL (ref 200–360)
TRIGL SERPL-MCNC: 265 MG/DL
TSH SERPL DL<=0.05 MIU/L-ACNC: 6.58 UIU/ML (ref 0.27–4.2)
VIT B12 BLD-MCNC: 429 PG/ML (ref 211–946)
VLDLC SERPL-MCNC: 53 MG/DL
WBC NRBC COR # BLD: 7.49 10*3/MM3 (ref 3.4–10.8)

## 2020-03-20 PROCEDURE — 82607 VITAMIN B-12: CPT | Performed by: NURSE PRACTITIONER

## 2020-03-20 PROCEDURE — 83540 ASSAY OF IRON: CPT | Performed by: NURSE PRACTITIONER

## 2020-03-20 PROCEDURE — 80061 LIPID PANEL: CPT | Performed by: NURSE PRACTITIONER

## 2020-03-20 PROCEDURE — 80053 COMPREHEN METABOLIC PANEL: CPT | Performed by: NURSE PRACTITIONER

## 2020-03-20 PROCEDURE — 83036 HEMOGLOBIN GLYCOSYLATED A1C: CPT | Performed by: NURSE PRACTITIONER

## 2020-03-20 PROCEDURE — 82746 ASSAY OF FOLIC ACID SERUM: CPT | Performed by: NURSE PRACTITIONER

## 2020-03-20 PROCEDURE — 84466 ASSAY OF TRANSFERRIN: CPT | Performed by: NURSE PRACTITIONER

## 2020-03-20 PROCEDURE — G0481 DRUG TEST DEF 8-14 CLASSES: HCPCS | Performed by: NURSE PRACTITIONER

## 2020-03-20 PROCEDURE — 84439 ASSAY OF FREE THYROXINE: CPT | Performed by: NURSE PRACTITIONER

## 2020-03-20 PROCEDURE — 85025 COMPLETE CBC W/AUTO DIFF WBC: CPT | Performed by: NURSE PRACTITIONER

## 2020-03-20 PROCEDURE — 82306 VITAMIN D 25 HYDROXY: CPT | Performed by: NURSE PRACTITIONER

## 2020-03-20 PROCEDURE — 84480 ASSAY TRIIODOTHYRONINE (T3): CPT | Performed by: NURSE PRACTITIONER

## 2020-03-20 PROCEDURE — 80307 DRUG TEST PRSMV CHEM ANLYZR: CPT | Performed by: NURSE PRACTITIONER

## 2020-03-20 PROCEDURE — 84443 ASSAY THYROID STIM HORMONE: CPT | Performed by: NURSE PRACTITIONER

## 2020-03-20 PROCEDURE — 36415 COLL VENOUS BLD VENIPUNCTURE: CPT | Performed by: NURSE PRACTITIONER

## 2020-03-20 RX ORDER — DOXEPIN HYDROCHLORIDE 100 MG/1
100 CAPSULE ORAL NIGHTLY
Qty: 90 CAPSULE | Refills: 1 | Status: SHIPPED | OUTPATIENT
Start: 2020-03-20 | End: 2020-06-06 | Stop reason: SDUPTHER

## 2020-03-20 RX ORDER — METOPROLOL SUCCINATE 50 MG/1
50 TABLET, EXTENDED RELEASE ORAL DAILY
Qty: 90 TABLET | Refills: 1 | Status: SHIPPED | OUTPATIENT
Start: 2020-03-20 | End: 2020-06-06 | Stop reason: SDUPTHER

## 2020-03-20 RX ORDER — CLOPIDOGREL BISULFATE 75 MG/1
75 TABLET ORAL DAILY
Qty: 90 TABLET | Refills: 1 | Status: SHIPPED | OUTPATIENT
Start: 2020-03-20 | End: 2020-06-06 | Stop reason: SDUPTHER

## 2020-03-20 RX ORDER — OMEGA-3-ACID ETHYL ESTERS 1 G/1
2 CAPSULE, LIQUID FILLED ORAL 2 TIMES DAILY
Qty: 120 CAPSULE | Refills: 5 | Status: SHIPPED | OUTPATIENT
Start: 2020-03-20 | End: 2020-01-01

## 2020-03-20 RX ORDER — FAMOTIDINE 20 MG/1
20 TABLET, FILM COATED ORAL 2 TIMES DAILY PRN
Qty: 180 TABLET | Refills: 1 | Status: SHIPPED | OUTPATIENT
Start: 2020-03-20 | End: 2020-06-06 | Stop reason: SDUPTHER

## 2020-03-20 RX ORDER — RANOLAZINE 500 MG/1
500 TABLET, EXTENDED RELEASE ORAL EVERY 12 HOURS SCHEDULED
Qty: 180 TABLET | Refills: 0 | Status: SHIPPED | OUTPATIENT
Start: 2020-03-20 | End: 2020-06-06 | Stop reason: SDUPTHER

## 2020-03-20 RX ORDER — NITROGLYCERIN 0.4 MG/1
0.4 TABLET SUBLINGUAL
Qty: 60 TABLET | Refills: 3 | Status: SHIPPED | OUTPATIENT
Start: 2020-03-20 | End: 2020-06-06 | Stop reason: SDUPTHER

## 2020-03-20 RX ORDER — LEVOTHYROXINE SODIUM 88 UG/1
88 TABLET ORAL DAILY
Qty: 90 TABLET | Refills: 1 | Status: SHIPPED | OUTPATIENT
Start: 2020-03-20 | End: 2020-03-24 | Stop reason: DRUGHIGH

## 2020-03-20 RX ORDER — LISINOPRIL 2.5 MG/1
2.5 TABLET ORAL DAILY
Qty: 90 TABLET | Refills: 1 | Status: SHIPPED | OUTPATIENT
Start: 2020-03-20 | End: 2020-06-06 | Stop reason: SDUPTHER

## 2020-03-23 DIAGNOSIS — E53.8 B12 DEFICIENCY: Primary | ICD-10-CM

## 2020-03-24 DIAGNOSIS — D64.9 ANEMIA, UNSPECIFIED TYPE: ICD-10-CM

## 2020-03-24 DIAGNOSIS — E53.8 B12 DEFICIENCY: Primary | ICD-10-CM

## 2020-03-24 DIAGNOSIS — E53.8 B12 DEFICIENCY: ICD-10-CM

## 2020-03-24 DIAGNOSIS — E03.9 ACQUIRED HYPOTHYROIDISM: Primary | ICD-10-CM

## 2020-03-24 LAB
6-ACETYLMORPHINE: NEGATIVE
6MAM SERPLBLD-MCNC: NOT DETECTED NG/MG CREAT
7-AMINOCLONAZEPAM UR: NOT DETECTED NG/MG CREAT
A-OH ALPRAZ/CREAT UR: 355 NG/MG CREAT
ALFENTANIL UR QL: NOT DETECTED NG/MG CREAT
ALPHA-HYDROXYMIDAZOLAM, URINE: NOT DETECTED NG/MG CREAT
ALPHA-HYDROXYTRIAZOLAM, URINE: NOT DETECTED NG/MG CREAT
AMOBARBITAL UR: NOT DETECTED
AMPHET UR QL CFM: NOT DETECTED NG/MG CREAT
AMPHETAMINES UR QL SCN: NEGATIVE
BARBITAL UR QL CFM: NOT DETECTED
BARBITURATES UR QL SCN: NEGATIVE
BENZODIAZ UR QL SCN: NORMAL
BENZOYLECGONINE UR: NOT DETECTED NG/MG CREAT
BUPRENORPHINE UR QL: NEGATIVE
BUPRENORPHINE UR QL: NOT DETECTED NG/MG CREAT
BUTABARBITAL UR QL: NOT DETECTED
BUTALBITAL UR QL: NOT DETECTED
CANNABINOIDS UR QL CFM: NEGATIVE
CLONAZEPAM UR QL: NOT DETECTED NG/MG CREAT
COCAETHYLENE UR QL CFM: NOT DETECTED NG/MG CREAT
COCAINE UR QL CFM: NEGATIVE
CODEINE UR QL: NOT DETECTED NG/MG CREAT
CONV COCAINE, UR: NOT DETECTED NG/MG CREAT
CONV REPORT SUMMARY: NORMAL
CREAT 24H UR-MCNC: 67 MG/DL
DESALKYLFLURAZ/CREAT UR: NOT DETECTED NG/MG CREAT
DESMETHYLFLUNITRAZEPAM: NOT DETECTED NG/MG CREAT
DIAZEPAM UR-MCNC: NOT DETECTED NG/MG CREAT
DIHYDROCODEINE UR: 215 NG/MG CREAT
EDDP SERPL QL: NOT DETECTED NG/MG CREAT
ETHANOL SCREEN URINE (REF): NEGATIVE
ETHANOL UR-MCNC: NOT DETECTED G/DL
FENTANYL UR QL: NEGATIVE
FENTANYL+NORFENTANYL UR QL SCN: NOT DETECTED NG/MG CREAT
FLUNITRAZEPAM SERPLBLD-MCNC: NOT DETECTED NG/MG CREAT
HYDROCODONE UR QL: 454 NG/MG CREAT
HYDROMORPHONE UR QL: 352 NG/MG CREAT
LEVEL OF DETECTION:: NORMAL
LORAZEPAM UR-MCNC: NOT DETECTED NG/MG CREAT
LORAZEPAM/CREAT UR: 131 NG/MG CREAT
MDA SERPLBLD-MCNC: NOT DETECTED NG/MG CREAT
MDMA UR QL SCN: NOT DETECTED NG/MG CREAT
MEPHOBARBITAL UR QL CFM: NOT DETECTED
METHADONE BLD QL SCN: NEGATIVE
METHADONE, URINE: NOT DETECTED NG/MG CREAT
METHAMPHETAMINE UR: NOT DETECTED NG/MG CREAT
MIDAZOLAM UR-MCNC: NOT DETECTED NG/MG CREAT
MORPHINE UR QL: NOT DETECTED NG/MG CREAT
N-DESMETHYLTRAMADOL, U: NOT DETECTED NG/MG CREAT
NARCOTICS UR: NEGATIVE
NORBUPRENORPHINE SERPLBLD-MCNC: NOT DETECTED NG/MG CREAT
NORCODEINE UR-MCNC: NOT DETECTED NG/MG CREAT
NORDIAZEPAM SERPL CFM-MCNC: NOT DETECTED NG/MG CREAT
NORFENTANYL UR: NOT DETECTED NG/MG CREAT
NORMORPHINE: NOT DETECTED NG/MG CREAT
NOROXYMORPHONE: NOT DETECTED NG/MG CREAT
O-DESMETHYLTRAMADOL, UR: NOT DETECTED NG/MG CREAT
OPIATES UR QL CFM: NORMAL
OPIATES, URINE, NORHYDROCODONE: 918 NG/MG CREAT
OPIATES, URINE, NOROXYCODONE: NOT DETECTED NG/MG CREAT
OXAZEPAM UR QL: NOT DETECTED NG/MG CREAT
OXYCODONE UR QL: NEGATIVE
OXYCODONE UR: NOT DETECTED NG/MG CREAT
OXYMORPHONE UR: NOT DETECTED NG/MG CREAT
PENTOBARBITAL UR: NOT DETECTED
PHENOBARB UR QL: NOT DETECTED
SECOBARBITAL UR QL: NOT DETECTED
SUFENTANIL UR QL: NOT DETECTED NG/MG CREAT
TAPENTADOL SERPLBLD-MCNC: NEGATIVE NG/ML
TAPENTADOL UR-MCNC: NOT DETECTED NG/MG CREAT
TEMAZEPAM UR QL CFM: NOT DETECTED NG/MG CREAT
THC UR CFM-MCNC: NOT DETECTED NG/MG CREAT
THIOPENTAL UR QL CFM: NOT DETECTED
TRAMADOL UR: NOT DETECTED NG/MG CREAT

## 2020-03-24 RX ORDER — LEVOTHYROXINE SODIUM 0.1 MG/1
100 TABLET ORAL DAILY
Qty: 90 TABLET | Refills: 1 | Status: SHIPPED | OUTPATIENT
Start: 2020-03-24 | End: 2020-06-06 | Stop reason: SDUPTHER

## 2020-03-30 ENCOUNTER — TELEPHONE (OUTPATIENT)
Dept: FAMILY MEDICINE CLINIC | Facility: CLINIC | Age: 85
End: 2020-03-30

## 2020-03-30 DIAGNOSIS — F41.1 GENERALIZED ANXIETY DISORDER: ICD-10-CM

## 2020-03-30 DIAGNOSIS — M54.50 CHRONIC MIDLINE LOW BACK PAIN WITHOUT SCIATICA: ICD-10-CM

## 2020-03-30 DIAGNOSIS — G89.29 CHRONIC MIDLINE LOW BACK PAIN WITHOUT SCIATICA: ICD-10-CM

## 2020-03-30 RX ORDER — HYDROCODONE BITARTRATE AND ACETAMINOPHEN 7.5; 325 MG/1; MG/1
1 TABLET ORAL 2 TIMES DAILY PRN
Qty: 60 TABLET | Refills: 0 | Status: SHIPPED | OUTPATIENT
Start: 2020-03-30 | End: 2020-04-27 | Stop reason: SDUPTHER

## 2020-03-30 RX ORDER — ALPRAZOLAM 1 MG/1
1 TABLET ORAL NIGHTLY PRN
Qty: 30 TABLET | Refills: 0 | Status: SHIPPED | OUTPATIENT
Start: 2020-03-30 | End: 2020-04-27 | Stop reason: SDUPTHER

## 2020-03-30 NOTE — TELEPHONE ENCOUNTER
Patient has chronic anxiety, requiring benzodiazepine use once daily concurrently with chronic pain requiring opiate pain medication. Patient has been education regarding potential dangers of oversedation and accidental overdose with use of both medications concurrently. Serial assessments of patient has yielded no sign of oversedation or adverse effects of patient, and he/she is advised and aware to notify my office immediately if symptoms do occur, as well as to discontinue use of benzodiazepine medication and opiate medication immediately if that occurs, pending medical evaluation and advice. Patient has been compliant with use of medications, UDS, visits with no adverse effects noted. Patient understands the risks associated with this controlled medication, including tolerance and addiction.  Patient also agrees to only obtain this medication from me, and not from a another provider, unless that provider is covering for me in my absence.  Patient also agrees to be compliant in dosing, and not self adjust the dose of medication.  A signed controlled substance agreement is on file, and the patient has received a controlled substance education sheet at this a previous visit.  The patient has also signed a consent for treatment with a controlled substance as per [facility] policy. DONAL was obtained. Refills were sent for: alprazolam 1 mg po daily prn #30, no refills; hydrocodone 7.5mg/ 325mg 1 po BID prn pain #60, no refills.     This document has been electronically signed by STEVE Abdi on March 30, 2020 17:25

## 2020-04-27 DIAGNOSIS — F41.1 GENERALIZED ANXIETY DISORDER: ICD-10-CM

## 2020-04-27 DIAGNOSIS — M54.50 CHRONIC MIDLINE LOW BACK PAIN WITHOUT SCIATICA: ICD-10-CM

## 2020-04-27 DIAGNOSIS — G89.29 CHRONIC MIDLINE LOW BACK PAIN WITHOUT SCIATICA: ICD-10-CM

## 2020-04-29 RX ORDER — HYDROCODONE BITARTRATE AND ACETAMINOPHEN 7.5; 325 MG/1; MG/1
1 TABLET ORAL 2 TIMES DAILY PRN
Qty: 60 TABLET | Refills: 0 | Status: SHIPPED | OUTPATIENT
Start: 2020-04-29 | End: 2020-05-29 | Stop reason: SDUPTHER

## 2020-04-29 RX ORDER — ALPRAZOLAM 1 MG/1
1 TABLET ORAL NIGHTLY PRN
Qty: 30 TABLET | Refills: 0 | Status: SHIPPED | OUTPATIENT
Start: 2020-04-29 | End: 2020-05-29 | Stop reason: SDUPTHER

## 2020-04-29 NOTE — TELEPHONE ENCOUNTER
Patient has chronic anxiety requiring benzodiazepine use concurrently with chronic pain requiring opiate pain medication and/ or gabapentin. Patient has been educated regarding potential dangers of oversedation and accidental overdose with use of both medications concurrently. Serial assessments of patient has yielded no sign of oversedation or adverse effects of patient, and he/she is advised and aware to notify my office immediately if symptoms do occur, as well as to discontinue use of benzodiazepine medication and opiate medication immediately if that occurs, pending medical evaluation and advice. Patient has been compliant with use of medications, UDS, visits with no adverse effects noted. Patient understands the risks associated with this controlled medication, including tolerance and addiction.  Patient also agrees to only obtain this medication from me, and not from a another provider, unless that provider is covering for me in my absence.  Patient also agrees to be compliant in dosing, and not self adjust the dose of medication.  A signed controlled substance agreement is on file, and the patient has received a controlled substance education sheet at this a previous visit.  The patient has also signed a consent for treatment with a controlled substance as per [facility] policy. DONAL was obtained. Refills were sent for: hydrocodone 7.5/325mg 1 po BID prn pain, #60. Alprazolam 1mg po nightly prn insomnia/ anxiety #30, no refill.     This document has been electronically signed by STEVE Abdi on April 29, 2020 13:34

## 2020-05-29 ENCOUNTER — OFFICE VISIT (OUTPATIENT)
Dept: FAMILY MEDICINE CLINIC | Facility: CLINIC | Age: 85
End: 2020-05-29

## 2020-05-29 DIAGNOSIS — F41.1 GENERALIZED ANXIETY DISORDER: ICD-10-CM

## 2020-05-29 DIAGNOSIS — D64.9 ANEMIA, UNSPECIFIED TYPE: ICD-10-CM

## 2020-05-29 DIAGNOSIS — N18.30 CKD (CHRONIC KIDNEY DISEASE) STAGE 3, GFR 30-59 ML/MIN (HCC): ICD-10-CM

## 2020-05-29 DIAGNOSIS — M54.50 CHRONIC MIDLINE LOW BACK PAIN WITHOUT SCIATICA: Primary | ICD-10-CM

## 2020-05-29 DIAGNOSIS — Z51.81 ENCOUNTER FOR THERAPEUTIC DRUG LEVEL MONITORING: ICD-10-CM

## 2020-05-29 DIAGNOSIS — E78.2 MIXED HYPERLIPIDEMIA: ICD-10-CM

## 2020-05-29 DIAGNOSIS — E03.9 ACQUIRED HYPOTHYROIDISM: ICD-10-CM

## 2020-05-29 DIAGNOSIS — G89.29 CHRONIC MIDLINE LOW BACK PAIN WITHOUT SCIATICA: Primary | ICD-10-CM

## 2020-05-29 DIAGNOSIS — E53.8 B12 DEFICIENCY: ICD-10-CM

## 2020-05-29 DIAGNOSIS — I10 ESSENTIAL HYPERTENSION: ICD-10-CM

## 2020-05-29 DIAGNOSIS — E11.42 TYPE 2 DIABETES MELLITUS WITH DIABETIC POLYNEUROPATHY, WITHOUT LONG-TERM CURRENT USE OF INSULIN (HCC): ICD-10-CM

## 2020-05-29 PROCEDURE — G2025 DIS SITE TELE SVCS RHC/FQHC: HCPCS | Performed by: NURSE PRACTITIONER

## 2020-05-29 RX ORDER — ALPRAZOLAM 1 MG/1
1 TABLET ORAL NIGHTLY PRN
Qty: 30 TABLET | Refills: 0 | Status: SHIPPED | OUTPATIENT
Start: 2020-05-29 | End: 2020-06-25 | Stop reason: SDUPTHER

## 2020-05-29 RX ORDER — HYDROCODONE BITARTRATE AND ACETAMINOPHEN 7.5; 325 MG/1; MG/1
1 TABLET ORAL 2 TIMES DAILY PRN
Qty: 60 TABLET | Refills: 0 | Status: SHIPPED | OUTPATIENT
Start: 2020-05-29 | End: 2020-06-25 | Stop reason: SDUPTHER

## 2020-05-29 RX ORDER — SYRINGE W-NEEDLE,DISPOSAB,3 ML 25GX5/8"
SYRINGE, EMPTY DISPOSABLE MISCELLANEOUS
Qty: 10 EACH | Refills: 3 | Status: SHIPPED | OUTPATIENT
Start: 2020-05-29 | End: 2021-01-01 | Stop reason: SDUPTHER

## 2020-05-29 RX ORDER — CYANOCOBALAMIN 1000 UG/ML
INJECTION, SOLUTION INTRAMUSCULAR; SUBCUTANEOUS
Qty: 4 ML | Refills: 3 | Status: SHIPPED | OUTPATIENT
Start: 2020-05-29 | End: 2021-01-01 | Stop reason: SDUPTHER

## 2020-05-29 NOTE — PROGRESS NOTES
Chief Complaint   Patient presents with   • Anxiety   • Back Pain     Subjective   Tatyana Yeager is a 85 y.o. female who presents to the office  By telephone visit due to pandemic for routine follow up of chronic anxiety and lower back pain.    The following portions of the patient's history were reviewed and updated as appropriate: allergies, current medications, past family history, past medical history, past social history, past surgical history and problem list.    History of Present Illness   You have chosen to receive care through a telephone visit. Do you consent to use a telephone visit for your medical care today? Yes  15 minutes medical discussion    Chronic anxiety well controlled with doxepin and prn xanax. Denies oversedation with medication.  Due for refill today.    Chronic lower back pain due to DDD lumbar spine. Managed with hdyrocodone. Not a candidate for NSAIDS due to CAD and chronic anticoagulation.  Reports adequate relief with current medications.  Due for refill today.    B12 deficiency Previously managed with oral B12 supplements with marginal results, would benefit from injected supplementation of B12. Daughter is familiar with medication injection and states will administer, so home B12 supply and syringes are prescribed to address deficiency today.   No new problems today.  Parts of most recent relevant visit HPI, ROS  and PE may be carried forward and all are updated as appropriate for current situation.    Past Medical History:   Diagnosis Date   • Acquired hypothyroidism    • Aortic valve stenosis    • Artificial lens present    • Borderline glaucoma    • Chronic depression    • Chronic pain    • Corneal foreign body     OS   • Coronary arteriosclerosis    • Cortical senile cataract    • Diabetes mellitus (CMS/HCC)     NO RETINOPATHY   • Diverticular disease of colon    • Essential hypertension    • Generalized anxiety disorder    • GERD (gastroesophageal reflux disease)    •  Histoplasmosis with retinitis    • History of bone density study 03/26/2009    DXA BONE DENSITY AXIAL 35255 (Normal for the hips and lumbar region. Hips represent 3.9% improvement.Lumbar represents 1.3 % improvement)   • History of echocardiogram 09/16/2015    Echocardiogram W/ color flow 65579 (Overall LV contractility normal with Ef of 55% with LVH and diastolic relaxation abnormality of the left ventricle.Moderate to severe AV stenosis.Mild mitral regurg.No intracardiac mass or thrombus.)   • History of mammogram 03/26/2009    benign findings    • Hyperlipidemia     not able to take statin     • Joint pain    • Low back pain    • Mammogram declined 2013   • Nonexudative age-related macular degeneration     MILD   • Nuclear cataract    • Peripheral vascular disease (CMS/HCC)    • Posterior subcapsular polar senile cataract    • Primary open angle glaucoma    • Transient ischemic attack involving carotid artery     Carotid territory transient ischemic attack     • Transient visual loss    • Type 2 diabetes mellitus (CMS/HCC)           Family History   Problem Relation Age of Onset   • Cirrhosis Mother         LIVER   • Throat cancer Father    • Coronary artery disease Other    • Diabetes Other    • Heart disease Other    • Breast cancer Neg Hx    • Colon cancer Neg Hx    • Endometrial cancer Neg Hx         Review of Systems   Constitutional: Negative.  Negative for fever and unexpected weight change.   HENT: Negative.    Eyes: Negative.    Respiratory: Negative.  Negative for cough, chest tightness and shortness of breath.    Cardiovascular: Negative.  Negative for chest pain.   Gastrointestinal: Negative.    Endocrine: Negative.    Genitourinary: Negative.  Negative for dysuria.   Musculoskeletal: Positive for back pain.        Chronic low back pain taking Hydrocodone 7.5 with moderate relief   Skin: Negative.  Negative for color change, pallor, rash and wound.   Allergic/Immunologic: Negative.    Neurological:  "Negative.    Hematological: Negative.    Psychiatric/Behavioral: Negative.  Negative for sleep disturbance and suicidal ideas.   All other systems reviewed and are negative.      Objective   Vitals:    05/29/20 1118   PainSc:   4   PainLoc: Back     Physical Exam   Constitutional: She is oriented to person, place, and time. No distress.   Pulmonary/Chest: Effort normal. No stridor. No respiratory distress.   Neurological: She is oriented to person, place, and time.   Psychiatric: She has a normal mood and affect. Her behavior is normal. Judgment and thought content normal.       Assessment/Plan   Tatyana was seen today for anxiety and back pain.    Diagnoses and all orders for this visit:    Chronic midline low back pain without sciatica  -     HYDROcodone-acetaminophen (NORCO) 7.5-325 MG per tablet; Take 1 tablet by mouth 2 (Two) Times a Day As Needed for Moderate Pain .    Generalized anxiety disorder  -     ALPRAZolam (XANAX) 1 MG tablet; Take 1 tablet by mouth At Night As Needed for Anxiety.  -     T4, Free; Future  -     TSH; Future  -     T3; Future    B12 deficiency  -     cyanocobalamin 1000 MCG/ML injection; Inject 1000mcg (1mL) into fat or muscle every 7 days x 4 doses, then every 4 weeks ongoing. For low B12.  -     Syringe 22G X 1\" 3 ML misc; 1 syringe for each B12 (cyanocobalomin) injection  -     Vitamin B12 & Folate    Acquired hypothyroidism  -     T4, Free; Future  -     TSH; Future  -     T3; Future    Anemia, unspecified type    Mixed hyperlipidemia  -     Lipid Panel; Future    Essential hypertension  -     Comprehensive Metabolic Panel; Future  -     CBC & Differential; Future  -     T4, Free; Future  -     TSH; Future  -     T3; Future    Type 2 diabetes mellitus with diabetic polyneuropathy, without long-term current use of insulin (CMS/[facility])  -     Comprehensive Metabolic Panel; Future  -     Hemoglobin A1c; Future    CKD (chronic kidney disease) stage 3, GFR 30-59 ml/min (CMS/[facility])  -     " Comprehensive Metabolic Panel; Future  -     Vitamin D 25 Hydroxy    Encounter for therapeutic drug level monitoring  -     ToxASSURE Select 13 Discrete -         Stable chronic anxiety and lower back pain. Refills sent for Xanax and hydrocodone.  B12 deficiency inadequately replaced orally, will begin home injection, prescriptions sent.  PHQ-2/PHQ-9 Depression Screening 12/27/2019   Little interest or pleasure in doing things 0   Feeling down, depressed, or hopeless 0   Trouble falling or staying asleep, or sleeping too much 0   Feeling tired or having little energy 0   Poor appetite or overeating 0   Feeling bad about yourself - or that you are a failure or have let yourself or your family down 0   Trouble concentrating on things, such as reading the newspaper or watching television 0   Moving or speaking so slowly that other people could have noticed. Or the opposite - being so fidgety or restless that you have been moving around a lot more than usual 0   Thoughts that you would be better off dead, or of hurting yourself in some way 0   Total Score 0   If you checked off any problems, how difficult have these problems made it for you to do your work, take care of things at home, or get along with other people? -   Patient has chronic anxiety requiring benzodiazepine use concurrently with chronic pain requiring opiate pain medication and/ or gabapentin. Patient has been educated regarding potential dangers of oversedation and accidental overdose with use of both medications concurrently. Serial assessments of patient has yielded no sign of oversedation or adverse effects of patient, and he/she is advised and aware to notify my office immediately if symptoms do occur, as well as to discontinue use of benzodiazepine medication and opiate medication immediately if that occurs, pending medical evaluation and advice. Patient has been compliant with use of medications, UDS, visits with no adverse effects noted. Patient  understands the risks associated with this controlled medication, including tolerance and addiction.  Patient also agrees to only obtain this medication from me, and not from a another provider, unless that provider is covering for me in my absence.  Patient also agrees to be compliant in dosing, and not self adjust the dose of medication.  A signed controlled substance agreement is on file, and the patient has received a controlled substance education sheet at this a previous visit.  The patient has also signed a consent for treatment with a controlled substance as per [facility] policy. DONAL was obtained.     STEVE Abdi         Return in about 12 weeks (around 8/21/2020).    There are no Patient Instructions on file for this visit.

## 2020-06-06 DIAGNOSIS — G47.00 INSOMNIA, UNSPECIFIED TYPE: ICD-10-CM

## 2020-06-06 DIAGNOSIS — E11.42 TYPE 2 DIABETES MELLITUS WITH DIABETIC POLYNEUROPATHY, WITHOUT LONG-TERM CURRENT USE OF INSULIN (HCC): ICD-10-CM

## 2020-06-06 DIAGNOSIS — I20.8 CHRONIC STABLE ANGINA (HCC): ICD-10-CM

## 2020-06-06 DIAGNOSIS — I10 ESSENTIAL HYPERTENSION: ICD-10-CM

## 2020-06-06 DIAGNOSIS — K21.9 GASTROESOPHAGEAL REFLUX DISEASE WITHOUT ESOPHAGITIS: ICD-10-CM

## 2020-06-06 DIAGNOSIS — E03.9 ACQUIRED HYPOTHYROIDISM: ICD-10-CM

## 2020-06-08 RX ORDER — METOPROLOL SUCCINATE 50 MG/1
50 TABLET, EXTENDED RELEASE ORAL DAILY
Qty: 90 TABLET | Refills: 1 | Status: SHIPPED | OUTPATIENT
Start: 2020-06-08 | End: 2020-01-01 | Stop reason: SDUPTHER

## 2020-06-08 RX ORDER — FAMOTIDINE 20 MG/1
20 TABLET, FILM COATED ORAL 2 TIMES DAILY PRN
Qty: 180 TABLET | Refills: 1 | Status: SHIPPED | OUTPATIENT
Start: 2020-06-08 | End: 2020-01-01 | Stop reason: SDUPTHER

## 2020-06-08 RX ORDER — RANOLAZINE 500 MG/1
500 TABLET, EXTENDED RELEASE ORAL EVERY 12 HOURS SCHEDULED
Qty: 180 TABLET | Refills: 0 | Status: SHIPPED | OUTPATIENT
Start: 2020-06-08 | End: 2020-08-04 | Stop reason: SDUPTHER

## 2020-06-08 RX ORDER — CLOPIDOGREL BISULFATE 75 MG/1
75 TABLET ORAL DAILY
Qty: 90 TABLET | Refills: 1 | Status: SHIPPED | OUTPATIENT
Start: 2020-06-08 | End: 2020-01-01 | Stop reason: SDUPTHER

## 2020-06-08 RX ORDER — LISINOPRIL 2.5 MG/1
2.5 TABLET ORAL DAILY
Qty: 90 TABLET | Refills: 1 | Status: SHIPPED | OUTPATIENT
Start: 2020-06-08 | End: 2020-01-01 | Stop reason: SDUPTHER

## 2020-06-08 RX ORDER — NITROGLYCERIN 0.4 MG/1
0.4 TABLET SUBLINGUAL
Qty: 60 TABLET | Refills: 3 | Status: SHIPPED | OUTPATIENT
Start: 2020-06-08 | End: 2020-01-01 | Stop reason: SDUPTHER

## 2020-06-08 RX ORDER — GLIMEPIRIDE 1 MG/1
1 TABLET ORAL 2 TIMES DAILY
Qty: 180 TABLET | Refills: 3 | Status: SHIPPED | OUTPATIENT
Start: 2020-06-08 | End: 2020-01-01

## 2020-06-08 RX ORDER — DOXEPIN HYDROCHLORIDE 100 MG/1
100 CAPSULE ORAL NIGHTLY
Qty: 90 CAPSULE | Refills: 1 | Status: SHIPPED | OUTPATIENT
Start: 2020-06-08 | End: 2020-01-01 | Stop reason: SDUPTHER

## 2020-06-08 RX ORDER — LEVOTHYROXINE SODIUM 0.1 MG/1
100 TABLET ORAL DAILY
Qty: 90 TABLET | Refills: 1 | Status: SHIPPED | OUTPATIENT
Start: 2020-06-08 | End: 2020-01-01 | Stop reason: SDUPTHER

## 2020-06-25 DIAGNOSIS — M54.50 CHRONIC MIDLINE LOW BACK PAIN WITHOUT SCIATICA: ICD-10-CM

## 2020-06-25 DIAGNOSIS — F41.1 GENERALIZED ANXIETY DISORDER: ICD-10-CM

## 2020-06-25 DIAGNOSIS — G89.29 CHRONIC MIDLINE LOW BACK PAIN WITHOUT SCIATICA: ICD-10-CM

## 2020-06-26 ENCOUNTER — TELEPHONE (OUTPATIENT)
Dept: FAMILY MEDICINE CLINIC | Facility: CLINIC | Age: 85
End: 2020-06-26

## 2020-06-26 RX ORDER — HYDROCODONE BITARTRATE AND ACETAMINOPHEN 7.5; 325 MG/1; MG/1
1 TABLET ORAL 2 TIMES DAILY PRN
Qty: 60 TABLET | Refills: 0 | Status: SHIPPED | OUTPATIENT
Start: 2020-06-26 | End: 2020-07-24 | Stop reason: SDUPTHER

## 2020-06-26 RX ORDER — ALPRAZOLAM 1 MG/1
1 TABLET ORAL NIGHTLY PRN
Qty: 30 TABLET | Refills: 0 | Status: SHIPPED | OUTPATIENT
Start: 2020-06-26 | End: 2020-07-24 | Stop reason: SDUPTHER

## 2020-06-26 NOTE — TELEPHONE ENCOUNTER
Patient has chronic anxiety requiring benzodiazepine use concurrently with chronic pain requiring opiate pain medication and/ or gabapentin. Patient has been educated regarding potential dangers of oversedation and accidental overdose with use of both medications concurrently. Serial assessments of patient has yielded no sign of oversedation or adverse effects of patient, and he/she is advised and aware to notify my office immediately if symptoms do occur, as well as to discontinue use of benzodiazepine medication and opiate medication immediately if that occurs, pending medical evaluation and advice. Patient has been compliant with use of medications, UDS, visits with no adverse effects noted. Patient understands the risks associated with this controlled medication, including tolerance and addiction.  Patient also agrees to only obtain this medication from me, and not from a another provider, unless that provider is covering for me in my absence.  Patient also agrees to be compliant in dosing, and not self adjust the dose of medication.  A signed controlled substance agreement is on file, and the patient has received a controlled substance education sheet at this a previous visit.  The patient has also signed a consent for treatment with a controlled substance as per [facility] policy. DONAL was obtained. Refills were sent for: hydrocodone and alprazolam.     This document has been electronically signed by STEVE Abdi on June 26, 2020 17:28

## 2020-07-24 DIAGNOSIS — F41.1 GENERALIZED ANXIETY DISORDER: ICD-10-CM

## 2020-07-24 DIAGNOSIS — M54.50 CHRONIC MIDLINE LOW BACK PAIN WITHOUT SCIATICA: ICD-10-CM

## 2020-07-24 DIAGNOSIS — G89.29 CHRONIC MIDLINE LOW BACK PAIN WITHOUT SCIATICA: ICD-10-CM

## 2020-07-27 ENCOUNTER — TELEPHONE (OUTPATIENT)
Dept: FAMILY MEDICINE CLINIC | Facility: CLINIC | Age: 85
End: 2020-07-27

## 2020-07-27 RX ORDER — HYDROCODONE BITARTRATE AND ACETAMINOPHEN 7.5; 325 MG/1; MG/1
1 TABLET ORAL 2 TIMES DAILY PRN
Qty: 60 TABLET | Refills: 0 | Status: SHIPPED | OUTPATIENT
Start: 2020-07-27 | End: 2020-01-01 | Stop reason: SDUPTHER

## 2020-07-27 RX ORDER — ALPRAZOLAM 1 MG/1
1 TABLET ORAL NIGHTLY PRN
Qty: 30 TABLET | Refills: 0 | Status: SHIPPED | OUTPATIENT
Start: 2020-07-27 | End: 2020-01-01 | Stop reason: SDUPTHER

## 2020-07-27 NOTE — TELEPHONE ENCOUNTER
Patient has chronic anxiety requiring benzodiazepine use concurrently with chronic pain requiring opiate pain medication and/ or gabapentin. Patient has been educated regarding potential dangers of oversedation and accidental overdose with use of both medications concurrently. Serial assessments of patient has yielded no sign of oversedation or adverse effects of patient, and he/she is advised and aware to notify my office immediately if symptoms do occur, as well as to discontinue use of benzodiazepine medication and opiate medication immediately if that occurs, pending medical evaluation and advice. Patient has been compliant with use of medications, UDS, visits with no adverse effects noted. Patient understands the risks associated with this controlled medication, including tolerance and addiction.  Patient also agrees to only obtain this medication from me, and not from a another provider, unless that provider is covering for me in my absence.  Patient also agrees to be compliant in dosing, and not self adjust the dose of medication.  A signed controlled substance agreement is on file, and the patient has received a controlled substance education sheet at this a previous visit.  The patient has also signed a consent for treatment with a controlled substance as per [facility] policy. DONAL was obtained. Refills were sent for:  Hydrocodone and alprazolam.     This document has been electronically signed by STEVE Abdi on July 27, 2020 11:00

## 2020-08-04 DIAGNOSIS — I20.8 CHRONIC STABLE ANGINA (HCC): ICD-10-CM

## 2020-08-05 RX ORDER — RANOLAZINE 500 MG/1
500 TABLET, EXTENDED RELEASE ORAL EVERY 12 HOURS SCHEDULED
Qty: 180 TABLET | Refills: 0 | Status: SHIPPED | OUTPATIENT
Start: 2020-08-05 | End: 2020-01-01 | Stop reason: SDUPTHER

## 2020-08-21 NOTE — TELEPHONE ENCOUNTER
Patient has chronic anxiety requiring benzodiazepine use concurrently with chronic pain requiring opiate pain medication and/ or gabapentin. Patient has been educated regarding potential dangers of oversedation and accidental overdose with use of both medications concurrently. Serial assessments of patient has yielded no sign of oversedation or adverse effects of patient, and he/she is advised and aware to notify my office immediately if symptoms do occur, as well as to discontinue use of benzodiazepine medication and opiate medication immediately if that occurs, pending medical evaluation and advice. Patient has been compliant with use of medications, UDS, visits with no adverse effects noted. Patient understands the risks associated with this controlled medication, including tolerance and addiction.  Patient also agrees to only obtain this medication from me, and not from a another provider, unless that provider is covering for me in my absence.  Patient also agrees to be compliant in dosing, and not self adjust the dose of medication.  A signed controlled substance agreement is on file, and the patient has received a controlled substance education sheet at this a previous visit.  The patient has also signed a consent for treatment with a controlled substance as per [facility] policy. DONAL was obtained. Refills were sent for:  Hydrocodone and alprazolam.   Her daughter has just . This is a very trying time.     This document has been electronically signed by STEVE Abdi on 2020 19:46

## 2020-08-25 NOTE — PROGRESS NOTES
The ABCs of the Annual Wellness Visit  Subsequent Medicare Wellness Visit    Chief Complaint   Patient presents with   • Injections     wanting injections for her back       Subjective   History of Present Illness:  Tatyana Yeager is a 85 y.o. female who presents for a Subsequent Medicare Wellness Visit.    HEALTH RISK ASSESSMENT    Recent Hospitalizations:  No hospitalization(s) within the last year.    Current Medical Providers:  Patient Care Team:  Odalis Leiva APRN as PCP - General (Family Medicine)    Smoking Status:  Social History     Tobacco Use   Smoking Status Former Smoker   • Packs/day: 1.00   • Years: 14.00   • Pack years: 14.00   Smokeless Tobacco Never Used   Tobacco Comment    QUIT AT AGE 35       Alcohol Consumption:  Social History     Substance and Sexual Activity   Alcohol Use No       Depression Screen:   PHQ-2/PHQ-9 Depression Screening 8/25/2020   Little interest or pleasure in doing things 2   Feeling down, depressed, or hopeless 2   Trouble falling or staying asleep, or sleeping too much 2   Feeling tired or having little energy 1   Poor appetite or overeating 1   Feeling bad about yourself - or that you are a failure or have let yourself or your family down 0   Trouble concentrating on things, such as reading the newspaper or watching television 2   Moving or speaking so slowly that other people could have noticed. Or the opposite - being so fidgety or restless that you have been moving around a lot more than usual 0   Thoughts that you would be better off dead, or of hurting yourself in some way 0   Total Score 10   If you checked off any problems, how difficult have these problems made it for you to do your work, take care of things at home, or get along with other people? Somewhat difficult       Fall Risk Screen:  STEADI Fall Risk Assessment was completed, and patient is at LOW risk for falls.Assessment completed on:8/25/2020    Health Habits and Functional and Cognitive  Screening:  Functional & Cognitive Status 8/25/2020   Do you have difficulty preparing food and eating? No   Do you have difficulty bathing yourself, getting dressed or grooming yourself? No   Do you have difficulty using the toilet? No   Do you have difficulty moving around from place to place? No   Do you have trouble with steps or getting out of a bed or a chair? No   Current Diet Well Balanced Diet   Dental Exam Not up to date   Eye Exam Not up to date   Exercise (times per week) 0 times per week   Do you need help using the phone?  No   Are you deaf or do you have serious difficulty hearing?  No   Do you need help with transportation? No   Do you need help shopping? Yes   Do you need help preparing meals?  -   Do you need help with housework?  No   Do you need help with laundry? No   Do you need help taking your medications? No   Do you need help managing money? No   Do you ever drive or ride in a car without wearing a seat belt? No   Have you felt unusual stress, anger or loneliness in the last month? Yes   Who do you live with? -   If you need help, do you have trouble finding someone available to you? No   Have you been bothered in the last four weeks by sexual problems? No   Do you have difficulty concentrating, remembering or making decisions? No         Does the patient have evidence of cognitive impairment? No    Asprin use counseling:Taking ASA appropriately as indicated    Age-appropriate Screening Schedule:  Refer to the list below for future screening recommendations based on patient's age, sex and/or medical conditions. Orders for these recommended tests are listed in the plan section. The patient has been provided with a written plan.    Health Maintenance   Topic Date Due   • DIABETIC EYE EXAM  02/26/2020   • DIABETIC FOOT EXAM  06/20/2020   • INFLUENZA VACCINE  08/01/2020   • URINE MICROALBUMIN  12/27/2020   • LIPID PANEL  03/20/2021   • HEMOGLOBIN A1C  03/20/2021   • TDAP/TD VACCINES (2 - Td)  06/21/2027   • ZOSTER VACCINE  Discontinued          The following portions of the patient's history were reviewed and updated as appropriate: She  has a past medical history of Acquired hypothyroidism, Aortic valve stenosis, Artificial lens present, Borderline glaucoma, Chronic depression, Chronic pain, Corneal foreign body, Coronary arteriosclerosis, Cortical senile cataract, Diabetes mellitus (CMS/HCC), Diverticular disease of colon, Essential hypertension, Generalized anxiety disorder, GERD (gastroesophageal reflux disease), Histoplasmosis with retinitis, History of bone density study (03/26/2009), History of echocardiogram (09/16/2015), History of mammogram (03/26/2009), Hyperlipidemia, Joint pain, Low back pain, Mammogram declined (2013), Nonexudative age-related macular degeneration, Nuclear cataract, Peripheral vascular disease (CMS/HCC), Posterior subcapsular polar senile cataract, Primary open angle glaucoma, Transient ischemic attack involving carotid artery, Transient visual loss, and Type 2 diabetes mellitus (CMS/[facility])..    Outpatient Medications Prior to Visit   Medication Sig Dispense Refill   • ALPRAZolam (XANAX) 1 MG tablet Take 1 tablet by mouth At Night As Needed for Anxiety. 30 tablet 0   • amLODIPine (NORVASC) 10 MG tablet Take 1 tablet by mouth Daily. 90 tablet 3   • clopidogrel (PLAVIX) 75 MG tablet Take 1 tablet by mouth Daily. 90 tablet 1   • cyanocobalamin 1000 MCG/ML injection Inject 1000mcg (1mL) into fat or muscle every 7 days x 4 doses, then every 4 weeks ongoing. For low B12. 4 mL 3   • docusate sodium (COLACE) 250 MG capsule Take 1 capsule by mouth Daily. 30 capsule 6   • doxepin (SINEquan) 100 MG capsule Take 1 capsule by mouth Every Night. 90 capsule 1   • famotidine (PEPCID) 20 MG tablet Take 1 tablet by mouth 2 (Two) Times a Day As Needed for Heartburn. 180 tablet 1   • ferrous sulfate 325 (65 FE) MG EC tablet Take 1 tablet by mouth Daily With Breakfast. 90 tablet 0   • glimepiride  "(AMARYL) 1 MG tablet Take 1 tablet by mouth 2 (Two) Times a Day. 180 tablet 3   • glucose blood test strip To test blood glucose 1 - 2 times a day 100 each 3   • glucose monitor monitoring kit For diabetes mellitus 1 each 0   • HYDROcodone-acetaminophen (NORCO) 7.5-325 MG per tablet Take 1 tablet by mouth 2 (Two) Times a Day As Needed for Moderate Pain . 60 tablet 0   • isosorbide mononitrate (IMDUR) 30 MG 24 hr tablet Take 1 tablet by mouth Daily. 30 tablet 11   • Lancets misc To test blood glucose 1 -2 times a day 100 each 3   • levothyroxine (SYNTHROID, LEVOTHROID) 100 MCG tablet Take 1 tablet by mouth Daily. Does increased on 3/24/20 90 tablet 1   • lisinopril (PRINIVIL,ZESTRIL) 2.5 MG tablet Take 1 tablet by mouth Daily. 90 tablet 1   • metoprolol succinate XL (TOPROL-XL) 50 MG 24 hr tablet Take 1 tablet by mouth Daily. 90 tablet 1   • nitroglycerin (Nitrostat) 0.4 MG SL tablet Place 1 tablet under the tongue Every 5 (Five) Minutes As Needed for Chest Pain. Take no more than 3 doses in 15 minutes. 60 tablet 3   • omega-3 acid ethyl esters (Lovaza) 1 g capsule Take 2 capsules by mouth 2 (Two) Times a Day. For cholesterol elevation 120 capsule 5   • ranolazine (RANEXA) 500 MG 12 hr tablet Take 1 tablet by mouth Every 12 (Twelve) Hours. 180 tablet 0   • Syringe 22G X 1\" 3 ML misc 1 syringe for each B12 (cyanocobalomin) injection 10 each 3     No facility-administered medications prior to visit.        Patient Active Problem List   Diagnosis   • Type 2 diabetes mellitus with diabetic polyneuropathy (CMS/HCC)   • Keratitis sicca, bilateral (CMS/HCC)   • Artificial lens present   • Peripheral arterial disease (CMS/HCC)   • Coronary artery disease involving 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 (CMS/HCC)   • Essential hypertension   • Hyperlipidemia   • BPPV (benign paroxysmal positional vertigo)   • Drug-induced constipation   • DDD (degenerative disc disease), lumbar   • Chronic midline low back " "pain without sciatica   • Primary osteoarthritis of right shoulder   • Gastroesophageal reflux disease without esophagitis   • Acquired hypothyroidism   • Pneumonia involving right lung   • Generalized anxiety disorder   • Non-rheumatic mitral regurgitation   • Nonrheumatic aortic valve stenosis   • Chronic stable angina (CMS/[facility])       Advanced Care Planning:  ACP discussion was held with the patient during this visit. Patient does not have an advance directive, information provided.    Review of Systems   Constitutional: Negative.  Negative for fever and unexpected weight change.   HENT: Negative.    Eyes: Negative.    Respiratory: Negative.  Negative for cough, chest tightness and shortness of breath.    Cardiovascular: Negative.  Negative for chest pain.   Gastrointestinal: Negative.    Endocrine: Negative.    Genitourinary: Negative.  Negative for dysuria.   Musculoskeletal: Positive for back pain.        Chronic low back pain taking Hydrocodone 7.5 with moderate relief   Skin: Negative.  Negative for color change, pallor, rash and wound.   Allergic/Immunologic: Negative.    Neurological: Negative.    Hematological: Negative.    Psychiatric/Behavioral: Negative.  Negative for sleep disturbance and suicidal ideas.   All other systems reviewed and are negative.      Compared to one year ago, the patient feels her physical health is the same.  Compared to one year ago, the patient feels her mental health is worse.    Reviewed chart for potential of high risk medication in the elderly: yes  Reviewed chart for potential of harmful drug interactions in the elderly:yes    Objective         Vitals:    08/25/20 1456   BP: 128/52   Pulse: 70   Resp: 18   Temp: 98.6 °F (37 °C)   SpO2: 97%   Weight: 72.4 kg (159 lb 11.2 oz)   Height: 157.5 cm (62.01\")   PainSc: 10-Worst pain ever   PainLoc: Back       Body mass index is 29.2 kg/m².  Discussed the patient's BMI with her. The BMI is above average; BMI management plan is " completed.    Physical Exam   Constitutional: She is oriented to person, place, and time. Vital signs are normal. She appears well-developed and well-nourished. No distress.   HENT:   Head: Normocephalic and atraumatic.   Eyes: Pupils are equal, round, and reactive to light. Conjunctivae are normal. Right eye exhibits no discharge. Left eye exhibits no discharge. No scleral icterus.   Neck: Normal range of motion. Neck supple. No JVD present. No tracheal deviation present. No thyromegaly present.   Cardiovascular: Normal rate, regular rhythm, S2 normal, normal heart sounds and intact distal pulses. Exam reveals no gallop and no friction rub.   No murmur heard.  Pulmonary/Chest: Effort normal and breath sounds normal. No respiratory distress. She has no wheezes. She has no rales. She exhibits no tenderness.   Abdominal: Soft. Bowel sounds are normal. She exhibits no distension.   Musculoskeletal: Normal range of motion. She exhibits no edema, tenderness or deformity.        Lumbar back: She exhibits pain.        Back:    Lymphadenopathy:     She has no cervical adenopathy.   Neurological: She is alert and oriented to person, place, and time. No cranial nerve deficit. GCS eye subscore is 4. GCS verbal subscore is 5. GCS motor subscore is 6.   Skin: Skin is warm and dry. Capillary refill takes 2 to 3 seconds. No rash noted. She is not diaphoretic. No erythema. No pallor.   Psychiatric: She has a normal mood and affect. Her behavior is normal. Judgment and thought content normal.   Nursing note and vitals reviewed.      Lab Results   Component Value Date    TRIG 324 (H) 08/25/2020    HDL 35 (L) 08/25/2020     (H) 08/25/2020    VLDL 64.8 08/25/2020      .adv   Assessment/Plan   Medicare Risks and Personalized Health Plan  CMS Preventative Services Quick Reference  Obesity/Overweight     The above risks/problems have been discussed with the patient.  Pertinent information has been shared with the patient in the  After Visit Summary.  Follow up plans and orders are seen below in the Assessment/Plan Section.    Diagnoses and all orders for this visit:    1. Medicare annual wellness visit, subsequent (Primary)    2. Generalized anxiety disorder    3. Chronic midline low back pain without sciatica      Follow Up:  Return in about 12 weeks (around 11/17/2020), or for pain/ anxiety, for 1 year for Medicare Wellness visit.     An After Visit Summary and PPPS were given to the patient.

## 2020-08-25 NOTE — PATIENT INSTRUCTIONS
Obesity, Adult  Obesity is the condition of having too much total body fat. Being overweight or obese means that your weight is greater than what is considered healthy for your body size. Obesity is determined by a measurement called BMI. BMI is an estimate of body fat and is calculated from height and weight. For adults, a BMI of 30 or higher is considered obese.  Obesity can lead to other health concerns and major illnesses, including:  · Stroke.  · Coronary artery disease (CAD).  · Type 2 diabetes.  · Some types of cancer, including cancers of the colon, breast, uterus, and gallbladder.  · Osteoarthritis.  · High blood pressure (hypertension).  · High cholesterol.  · Sleep apnea.  · Gallbladder stones.  · Infertility problems.  What are the causes?  Common causes of this condition include:  · Eating daily meals that are high in calories, sugar, and fat.  · Being born with genes that may make you more likely to become obese.  · Having a medical condition that causes obesity, including:  ? Hypothyroidism.  ? Polycystic ovarian syndrome (PCOS).  ? Binge-eating disorder.  ? Cushing syndrome.  · Taking certain medicines, such as steroids, antidepressants, and seizure medicines.  · Not being physically active (sedentary lifestyle).  · Not getting enough sleep.  · Drinking high amounts of sugar-sweetened beverages, such as soft drinks.  What increases the risk?  The following factors may make you more likely to develop this condition:  · Having a family history of obesity.  · Being a woman of  descent.  · Being a man of  descent.  · Living in an area with limited access to:  ? Solares, recreation centers, or sidewalks.  ? Healthy food choices, such as grocery stores and farmers' markets.  What are the signs or symptoms?  The main sign of this condition is having too much body fat.  How is this diagnosed?  This condition is diagnosed based on:  · Your BMI. If you are an adult with a BMI of 30 or  higher, you are considered obese.  · Your waist circumference. This measures the distance around your waistline.  · Your skinfold thickness. Your health care provider may gently pinch a fold of your skin and measure it.  You may have other tests to check for underlying conditions.  How is this treated?  Treatment for this condition often includes changing your lifestyle. Treatment may include some or all of the following:  · Dietary changes. This may include developing a healthy meal plan.  · Regular physical activity. This may include activity that causes your heart to beat faster (aerobic exercise) and strength training. Work with your health care provider to design an exercise program that works for you.  · Medicine to help you lose weight if you are unable to lose 1 pound a week after 6 weeks of healthy eating and more physical activity.  · Treating conditions that cause the obesity (underlying conditions).  · Surgery. Surgical options may include gastric banding and gastric bypass. Surgery may be done if:  ? Other treatments have not helped to improve your condition.  ? You have a BMI of 40 or higher.  ? You have life-threatening health problems related to obesity.  Follow these instructions at home:  Eating and drinking    · Follow recommendations from your health care provider about what you eat and drink. Your health care provider may advise you to:  ? Limit fast food, sweets, and processed snack foods.  ? Choose low-fat options, such as low-fat milk instead of whole milk.  ? Eat 5 or more servings of fruits or vegetables every day.  ? Eat at home more often. This gives you more control over what you eat.  ? Choose healthy foods when you eat out.  ? Learn to read food labels. This will help you understand how much food is considered 1 serving.  ? Learn what a healthy serving size is.  ? Keep low-fat snacks available.  ? Limit sugary drinks, such as soda, fruit juice, sweetened iced tea, and flavored  milk.  · Drink enough water to keep your urine pale yellow.  · Do not follow a fad diet. Fad diets can be unhealthy and even dangerous.  Physical activity  · Exercise regularly, as told by your health care provider.  ? Most adults should get up to 150 minutes of moderate-intensity exercise every week.  ? Ask your health care provider what types of exercise are safe for you and how often you should exercise.  · Warm up and stretch before being active.  · Cool down and stretch after being active.  · Rest between periods of activity.  Lifestyle  · Work with your health care provider and a dietitian to set a weight-loss goal that is healthy and reasonable for you.  · Limit your screen time.  · Find ways to reward yourself that do not involve food.  · Do not drink alcohol if:  ? Your health care provider tells you not to drink.  ? You are pregnant, may be pregnant, or are planning to become pregnant.  · If you drink alcohol:  ? Limit how much you use to:  § 0-1 drink a day for women.  § 0-2 drinks a day for men.  ? Be aware of how much alcohol is in your drink. In the [place] one drink equals one 12 oz bottle of beer (355 mL), one 5 oz glass of wine (148 mL), or one 1½ oz glass of hard liquor (44 mL).  General instructions  · Keep a weight-loss journal to keep track of the food you eat and how much exercise you get.  · Take over-the-counter and prescription medicines only as told by your health care provider.  · Take vitamins and supplements only as told by your health care provider.  · Consider joining a support group. Your health care provider may be able to recommend a support group.  · Keep all follow-up visits as told by your health care provider. This is important.  Contact a health care provider if:  · You are unable to meet your weight loss goal after 6 weeks of dietary and lifestyle changes.  Get help right away if you are having:  · Trouble breathing.  · Suicidal thoughts or behaviors.  Summary  · Obesity is the  condition of having too much total body fat.  · Being overweight or obese means that your weight is greater than what is considered healthy for your body size.  · Work with your health care provider and a dietitian to set a weight-loss goal that is healthy and reasonable for you.  · Exercise regularly, as told by your health care provider. Ask your health care provider what types of exercise are safe for you and how often you should exercise.  This information is not intended to replace advice given to you by your health care provider. Make sure you discuss any questions you have with your health care provider.  Document Released: 01/25/2006 Document Revised: 08/22/2019 Document Reviewed: 08/22/2019  [employer] Patient Education © 2020 [employer]    Advance Care Planning and Advance Directives     You make decisions on a daily basis - decisions about where you want to live, your career, your home, your life. Perhaps one of the most important decisions you face is your choice for future medical care. Take time to talk with your family and your healthcare team and start planning today.  Advance Care Planning is a process that can help you:  · Understand possible future healthcare decisions in light of your own experiences  · Reflect on those decision in light of your goals and values  · Discuss your decisions with those closest to you and the healthcare professionals that care for you  · Make a plan by creating a document that reflects your wishes    Surrogate Decision Maker  In the event of a medical emergency, which has left you unable to communicate or to make your own decisions, you would need someone to make decisions for you.  It is important to discuss your preferences for medical treatment with this person while you are in good health.     Qualities of a surrogate decision maker:  • Willing to take on this role and responsibility  • Knows what you want for future medical care  • Willing to follow your wishes  even if they don't agree with them  • Able to make difficult medical decisions under stressful circumstances    Advance Directives  These are legal documents you can create that will guide your healthcare team and decision maker(s) when needed. These documents can be stored in the electronic medical record.    · Living Will - a legal document to guide your care if you have a terminal condition or a serious illness and are unable to communicate. States vary by statute in document names/types, but most forms may include one or more of the following:        -  Directions regarding life-prolonging treatments        -  Directions regarding artificially provided nutrition/hydration        -  Choosing a healthcare decision maker        -  Direction regarding organ/tissue donation    · Durable Power of  for Healthcare - this document names an -in-fact to make medical decisions for you, but it may also allow this person to make personal and financial decisions for you. Please seek the advice of an  if you need this type of document.    **Advance Directives are not required and no one may discriminate against you if you do not sign one.    Medical Orders  Many states allow specific forms/orders signed by your physician to record your wishes for medical treatment in your current state of health. This form, signed in personal communication with your physician, addresses resuscitation and other medical interventions that you may or may not want.      For more information or to schedule a time with a [facility] Advance Care Planning Facilitator contact: [email or web address] or call [phone] and someone will contact you directly.

## 2020-08-25 NOTE — PROGRESS NOTES
Chief Complaint   Patient presents with   • Injections     wanting injections for her back     Subjective   Tatyana Yeager is a 85 y.o. female who presents to the office for routine 12 week follow up of chronic back pain and anxiety.     The following portions of the patient's history were reviewed and updated as appropriate: allergies, current medications, past family history, past medical history, past social history, past surgical history and problem list.    History of Present Illness   Chronic anxiety well controlled with doxepin and prn xanax. Denies oversedation with medication.  Not due for refill today.     Chronic lower back pain due to DDD lumbar spine. Managed with hdyrocodone. Not a candidate for NSAIDS due to CAD and chronic anticoagulation.  Reports adequate relief with current medications.  Not due for refill today.     Today reports recent worsening of midline lower back pain and requests toradol/ steroid injection IM for relief, this has helped in the past.   She is in mourning today. Her Daughter Jamilah Ya  just last week. They were very close and Jamilah was previously in the habit of attending all her mother's appointments and ordering her med refills.     No new problems today.  Parts of most recent relevant visit HPI, ROS  and PE may be carried forward and all are updated as appropriate for current situation.     Past Medical History:   Diagnosis Date   • Acquired hypothyroidism    • Aortic valve stenosis    • Artificial lens present    • Borderline glaucoma    • Chronic depression    • Chronic pain    • Corneal foreign body     OS   • Coronary arteriosclerosis    • Cortical senile cataract    • Diabetes mellitus (CMS/HCC)     NO RETINOPATHY   • Diverticular disease of colon    • Essential hypertension    • Generalized anxiety disorder    • GERD (gastroesophageal reflux disease)    • Histoplasmosis with retinitis    • History of bone density study 2009    DXA BONE DENSITY  AXIAL 10997 (Normal for the hips and lumbar region. Hips represent 3.9% improvement.Lumbar represents 1.3 % improvement)   • History of echocardiogram 09/16/2015    Echocardiogram W/ color flow 17141 (Overall LV contractility normal with Ef of 55% with LVH and diastolic relaxation abnormality of the left ventricle.Moderate to severe AV stenosis.Mild mitral regurg.No intracardiac mass or thrombus.)   • History of mammogram 03/26/2009    benign findings    • Hyperlipidemia     not able to take statin     • Joint pain    • Low back pain    • Mammogram declined 2013   • Nonexudative age-related macular degeneration     MILD   • Nuclear cataract    • Peripheral vascular disease (CMS/HCC)    • Posterior subcapsular polar senile cataract    • Primary open angle glaucoma    • Transient ischemic attack involving carotid artery     Carotid territory transient ischemic attack     • Transient visual loss    • Type 2 diabetes mellitus (CMS/HCC)           Family History   Problem Relation Age of Onset   • Cirrhosis Mother         LIVER   • Throat cancer Father    • Coronary artery disease Other    • Diabetes Other    • Heart disease Other    • Breast cancer Neg Hx    • Colon cancer Neg Hx    • Endometrial cancer Neg Hx         Review of Systems   Constitutional: Negative.  Negative for fever and unexpected weight change.   HENT: Negative.    Eyes: Negative.    Respiratory: Negative.  Negative for cough, chest tightness and shortness of breath.    Cardiovascular: Negative.  Negative for chest pain.   Gastrointestinal: Negative.    Endocrine: Negative.    Genitourinary: Negative.  Negative for dysuria.   Musculoskeletal: Positive for back pain.        Chronic low back pain taking Hydrocodone 7.5 with moderate relief   Skin: Negative.  Negative for color change, pallor, rash and wound.   Allergic/Immunologic: Negative.    Neurological: Negative.    Hematological: Negative.    Psychiatric/Behavioral: Negative.  Negative for sleep  "disturbance and suicidal ideas.   All other systems reviewed and are negative.      Objective   Vitals:    08/25/20 1456   BP: 128/52   Pulse: 70   Resp: 18   Temp: 98.6 °F (37 °C)   SpO2: 97%   Weight: 72.4 kg (159 lb 11.2 oz)   Height: 157.5 cm (62.01\")   PainSc: 10-Worst pain ever   PainLoc: Back     Physical Exam   Constitutional: She is oriented to person, place, and time. Vital signs are normal. She appears well-developed and well-nourished. No distress.   HENT:   Head: Normocephalic and atraumatic.   Eyes: Pupils are equal, round, and reactive to light. Conjunctivae and EOM are normal. Right eye exhibits no discharge. Left eye exhibits no discharge. No scleral icterus.   Neck: Normal range of motion. Neck supple. No JVD present. No tracheal deviation present. No thyromegaly present.   Cardiovascular: Normal rate, regular rhythm, S2 normal and intact distal pulses. Exam reveals no gallop and no friction rub.   Murmur heard.   Systolic murmur is present with a grade of 3/6.  Pulmonary/Chest: Effort normal and breath sounds normal. No stridor. No respiratory distress. She has no wheezes. She has no rales. She exhibits no tenderness.   Abdominal: Soft. Bowel sounds are normal. She exhibits no distension.   Musculoskeletal: Normal range of motion. She exhibits no edema, tenderness or deformity.        Lumbar back: She exhibits pain.        Back:    Lymphadenopathy:     She has no cervical adenopathy.   Neurological: She is alert and oriented to person, place, and time. No cranial nerve deficit. GCS eye subscore is 4. GCS verbal subscore is 5. GCS motor subscore is 6.   Skin: Skin is warm and dry. Capillary refill takes 2 to 3 seconds. No rash noted. She is not diaphoretic. No erythema. No pallor.   Psychiatric: She has a normal mood and affect. Her behavior is normal. Judgment and thought content normal.   Nursing note and vitals reviewed.      Assessment/Plan   Tatyana was seen today for injections.    Diagnoses " and all orders for this visit:    Generalized anxiety disorder    Chronic midline low back pain without sciatica         Stable chronic anxiety, no refill due today.   Stable lower back pain usually well managed with hydrocodone, not due for refill today, current exacerbation, for which IM injections of Toradol and Kenalog are prescribed in office today.   PHQ-2/PHQ-9 Depression Screening 12/27/2019   Little interest or pleasure in doing things 0   Feeling down, depressed, or hopeless 0   Trouble falling or staying asleep, or sleeping too much 0   Feeling tired or having little energy 0   Poor appetite or overeating 0   Feeling bad about yourself - or that you are a failure or have let yourself or your family down 0   Trouble concentrating on things, such as reading the newspaper or watching television 0   Moving or speaking so slowly that other people could have noticed. Or the opposite - being so fidgety or restless that you have been moving around a lot more than usual 0   Thoughts that you would be better off dead, or of hurting yourself in some way 0   Total Score 0   If you checked off any problems, how difficult have these problems made it for you to do your work, take care of things at home, or get along with other people? -   Patient understands the risks associated with this controlled medication, including tolerance and addiction.  Patient also agrees to only obtain this medication from me, and not from a another provider, unless that provider is covering for me in my absence.  Patient also agrees to be compliant in dosing, and not self adjust the dose of medication.  A signed controlled substance agreement is on file, and the patient has received a controlled substance education sheet at this a previous visit.  The patient has also signed a consent for treatment with a controlled substance as per [facility] policy. DONAL was obtained.      STEVE Abdi         Return in about 12 weeks  (around 11/17/2020).    There are no Patient Instructions on file for this visit.

## 2020-09-04 NOTE — PROGRESS NOTES
Chief Complaint   Patient presents with   • Follow-up     on labs from 8/25/20     Subjective   Tatyana Yeager is a 85 y.o. female who presents to the office by telephone visit due top pandemic for follow up of abnormal labs    The following portions of the patient's history were reviewed and updated as appropriate: allergies, current medications, past family history, past medical history, past social history, past surgical history and problem list.    History of Present Illness   You have chosen to receive care through a telephone visit. Do you consent to use a telephone visit for your medical care today? Yes  24 minutes medical discussion.   Hyperglycemia/ Diabetes: resolved. Glucose 60 and A1C 4.7%. Stopped glimeperide completely today, has lost weight as well and isnt eating as much.   Lipids: continue very elevated. Is intolerant to all statin class medications, Zetia, cholestyramine. Is taking fish oil 1000mg every other day, was worried it would make her blood too thin due to plavix. Advised to take BID. She has been on injectible cholesterol medication in the past under care of Dr Alfa Shook, cardiology, and she developed unbearable body aches/ myalgias with it as well. She is aware of risk of elevated lipids to her heart health, prevention of MI/ CVA. Is willing to increase fish oil to BID.    dsicussed all other recent labs, no new complaints today.   Parts of most recent relevant visit HPI, ROS  and PE may be carried forward and all are updated as appropriate for current situation.    Past Medical History:   Diagnosis Date   • Acquired hypothyroidism    • Aortic valve stenosis    • Artificial lens present    • Borderline glaucoma    • Chronic depression    • Chronic pain    • Corneal foreign body     OS   • Coronary arteriosclerosis    • Cortical senile cataract    • Diabetes mellitus (CMS/HCC)     NO RETINOPATHY   • Diverticular disease of colon    • Essential hypertension    • Generalized  anxiety disorder    • GERD (gastroesophageal reflux disease)    • Histoplasmosis with retinitis    • History of bone density study 03/26/2009    DXA BONE DENSITY AXIAL 89193 (Normal for the hips and lumbar region. Hips represent 3.9% improvement.Lumbar represents 1.3 % improvement)   • History of echocardiogram 09/16/2015    Echocardiogram W/ color flow 76297 (Overall LV contractility normal with Ef of 55% with LVH and diastolic relaxation abnormality of the left ventricle.Moderate to severe AV stenosis.Mild mitral regurg.No intracardiac mass or thrombus.)   • History of mammogram 03/26/2009    benign findings    • Hyperlipidemia     not able to take statin     • Joint pain    • Low back pain    • Mammogram declined 2013   • Nonexudative age-related macular degeneration     MILD   • Nuclear cataract    • Peripheral vascular disease (CMS/HCC)    • Posterior subcapsular polar senile cataract    • Primary open angle glaucoma    • Transient ischemic attack involving carotid artery     Carotid territory transient ischemic attack     • Transient visual loss    • Type 2 diabetes mellitus (CMS/HCC)           Family History   Problem Relation Age of Onset   • Cirrhosis Mother         LIVER   • Throat cancer Father    • Coronary artery disease Other    • Diabetes Other    • Heart disease Other    • Breast cancer Neg Hx    • Colon cancer Neg Hx    • Endometrial cancer Neg Hx         Review of Systems   Constitutional: Negative.  Negative for fever and unexpected weight change.   HENT: Negative.    Eyes: Negative.    Respiratory: Negative.  Negative for cough, chest tightness and shortness of breath.    Cardiovascular: Negative.  Negative for chest pain.   Gastrointestinal: Negative.    Endocrine: Negative.    Genitourinary: Negative.  Negative for dysuria.   Musculoskeletal: Positive for back pain.        Chronic low back pain taking Hydrocodone 7.5 with moderate relief   Skin: Negative.  Negative for color change, pallor,  "rash and wound.   Allergic/Immunologic: Negative.    Neurological: Negative.    Hematological: Negative.    Psychiatric/Behavioral: Negative.  Negative for sleep disturbance and suicidal ideas.   All other systems reviewed and are negative.      Objective   Vitals:    09/04/20 1133   Weight: 72.1 kg (159 lb)   Height: 157.5 cm (62.01\")   PainSc:   4   PainLoc: Back     Physical Exam   Constitutional: She is oriented to person, place, and time. No distress.   Pulmonary/Chest: Effort normal. No stridor. No respiratory distress.   Neurological: She is oriented to person, place, and time.   Psychiatric: She has a normal mood and affect. Her behavior is normal. Judgment and thought content normal.       Assessment/Plan   Tatyana was seen today for follow-up.    Diagnoses and all orders for this visit:    Mixed hyperlipidemia  -     Omega-3 Fatty Acids (FISH OIL) 1000 MG capsule capsule; Take 1 capsule by mouth 2 (Two) Times a Day With Meals. OTC         Increased lipids, intolerant to all statins, zetia, fenofibrate. Increase fish oil to 1000mg BID today.   Stopped iron supplement at last visit. Stopped glimepiride today due to subnormal glucose and A1C. Diabetes much improved if not resolved.   All labs discussed.  PHQ-2/PHQ-9 Depression Screening 9/4/2020   Little interest or pleasure in doing things 0   Feeling down, depressed, or hopeless 1   Trouble falling or staying asleep, or sleeping too much 0   Feeling tired or having little energy 0   Poor appetite or overeating 2   Feeling bad about yourself - or that you are a failure or have let yourself or your family down 0   Trouble concentrating on things, such as reading the newspaper or watching television 0   Moving or speaking so slowly that other people could have noticed. Or the opposite - being so fidgety or restless that you have been moving around a lot more than usual 0   Thoughts that you would be better off dead, or of hurting yourself in some way 0   Total " Score 3   If you checked off any problems, how difficult have these problems made it for you to do your work, take care of things at home, or get along with other people? -       Odalis LOVE Leiva, APRN         Return for Next scheduled follow up.    There are no Patient Instructions on file for this visit.

## 2020-10-20 NOTE — TELEPHONE ENCOUNTER
Patient seen in office every 3 months for chronic pain requiring opiate pain medication. Compliant with medication, visits with no adverse effects noted. DONAL and UDS current and appropriate. Patient called requesting scheduled refill at appropriate interval. Patient understands the risks associated with this controlled medication, including tolerance and addiction.  Patient also agrees to only obtain this medication from me, and not from a another provider, unless that provider is covering for me in my absence.  Patient also agrees to be compliant in dosing, and not self adjust the dose of medication.  A signed controlled substance agreement is on file, and the patient has received a controlled substance education sheet at this a previous visit.  The patient has also signed a consent for treatment with a controlled substance as per [facility] policy. DONAL was obtained.   Refill sent for hydrocodone.     This document has been electronically signed by STEVE Abdi on October 19, 2020 19:20 CDT

## 2020-10-24 NOTE — TELEPHONE ENCOUNTER
Patient seen in office every 3 months for chronic anxiety requiring benzodiazepine anxiolytic. Compliant with medication, visits with no adverse effects noted. DONAL and UDS current and appropriate. Patient called requesting scheduled refill at appropriate interval. Patient understands the risks associated with this controlled medication, including tolerance and addiction.  Patient also agrees to only obtain this medication from me, and not from a another provider, unless that provider is covering for me in my absence.  Patient also agrees to be compliant in dosing, and not self adjust the dose of medication.  A signed controlled substance agreement is on file, and the patient has received a controlled substance education sheet at this a previous visit.  The patient has also signed a consent for treatment with a controlled substance as per [facility] policy. DONAL was obtained. Refill sent for alprazolam.     This document has been electronically signed by STEVE Abdi on October 23, 2020 19:27 CDT

## 2020-11-19 NOTE — PROGRESS NOTES
Chief Complaint   Patient presents with   • Back Pain   • Anxiety     Subjective   Tatyana Yeager is a 86 y.o. female who presents to the office by telephone visit due to Covid pandemic for routine 12 week evaluation of chronic pain/ anxiety.     The following portions of the patient's history were reviewed and updated as appropriate: allergies, current medications, past family history, past medical history, past social history, past surgical history and problem list.    History of Present Illness   You have chosen to receive care through a telephone visit. Do you consent to use a telephone visit for your medical care today? Yes  22 minutes medical discussion.     Chronic lower back pain due to osteoarthritis with degenerative disc disease of the lumbar spine.  Onset many years ago.  Gradually worsening over time.  Managed with hydrocodone with good results.  Denies adverse effects of medication.  Compliant with use.  Due for refill today.    Chronic anxiety managed with as needed alprazolam, 100 mg doxepin nightly.  Compliant with use.  Denies oversedation with use.  Due for refill alprazolam today.    Reports that she stumbled and nearly fell several days ago but she did twist her right ankle significantly enough that she has been primarily located in a chair in the living room for the past 2 to 3 days.  Ambulation requires use of a walker so she can keep her right foot off the floor.  She states that the ankle swelled up quite a bit overnight after the injury and then it went down and started turning colors.  Reports significant bruising.  States has done this before had it checked out and it was just a sprain she has no concern for fracture and does not want to go have x-rays or have medical assessment of this foot/ankle.  We discussed the fact that she could have torn something that needs surgery sooner than later her that the could be fractures she is not aware of, however, he is not interested in pursuing  any further care for this at the moment.    No new complaints today.  Parts of most recent relevant visit HPI, ROS  and PE may be carried forward and all are updated as appropriate for current situation.    Past Medical History:   Diagnosis Date   • Acquired hypothyroidism    • Aortic valve stenosis    • Artificial lens present    • Borderline glaucoma    • Chronic depression    • Chronic pain    • Corneal foreign body     OS   • Coronary arteriosclerosis    • Cortical senile cataract    • Diabetes mellitus (CMS/HCC)     NO RETINOPATHY   • Diverticular disease of colon    • Essential hypertension    • Generalized anxiety disorder    • GERD (gastroesophageal reflux disease)    • Histoplasmosis with retinitis    • History of bone density study 03/26/2009    DXA BONE DENSITY AXIAL 69108 (Normal for the hips and lumbar region. Hips represent 3.9% improvement.Lumbar represents 1.3 % improvement)   • History of echocardiogram 09/16/2015    Echocardiogram W/ color flow 08997 (Overall LV contractility normal with Ef of 55% with LVH and diastolic relaxation abnormality of the left ventricle.Moderate to severe AV stenosis.Mild mitral regurg.No intracardiac mass or thrombus.)   • History of mammogram 03/26/2009    benign findings    • Hyperlipidemia     not able to take statin     • Joint pain    • Low back pain    • Mammogram declined 2013   • Nonexudative age-related macular degeneration     MILD   • Nuclear cataract    • Peripheral vascular disease (CMS/HCC)    • Posterior subcapsular polar senile cataract    • Primary open angle glaucoma    • Transient ischemic attack involving carotid artery     Carotid territory transient ischemic attack     • Transient visual loss    • Type 2 diabetes mellitus (CMS/HCC)           Family History   Problem Relation Age of Onset   • Cirrhosis Mother         LIVER   • Throat cancer Father    • Coronary artery disease Other    • Diabetes Other    • Heart disease Other    • Breast cancer Neg  Hx    • Colon cancer Neg Hx    • Endometrial cancer Neg Hx         Review of Systems   Constitutional: Negative.  Negative for fever and unexpected weight change.   HENT: Negative.    Eyes: Negative.    Respiratory: Negative.  Negative for cough, chest tightness and shortness of breath.    Cardiovascular: Negative.  Negative for chest pain.   Gastrointestinal: Negative.    Endocrine: Negative.    Genitourinary: Negative.  Negative for dysuria.   Musculoskeletal: Positive for arthralgias and back pain.        Chronic low back pain taking Hydrocodone 7.5 with moderate relief   Skin: Positive for color change. Negative for pallor, rash and wound.   Allergic/Immunologic: Negative.    Neurological: Negative.    Hematological: Negative.    Psychiatric/Behavioral: Negative.  Negative for sleep disturbance and suicidal ideas.   All other systems reviewed and are negative.      Objective   Vitals:    11/19/20 1404   PainSc:   5   PainLoc: Ankle     Physical Exam  Vitals signs and nursing note reviewed.   Constitutional:       General: She is not in acute distress.  Pulmonary:      Effort: Pulmonary effort is normal. No respiratory distress.      Breath sounds: No stridor.      Comments: Patient able to speak 1 full sentences without breathlessness.  There is no spontaneous cough during conversation.  Neurological:      Mental Status: She is alert and oriented to person, place, and time.   Psychiatric:         Mood and Affect: Mood normal.         Behavior: Behavior normal.         Thought Content: Thought content normal.         Judgment: Judgment normal.         Assessment/Plan   Diagnoses and all orders for this visit:    1. Angina pectoris (CMS/HCC) (Primary)  -     nitroglycerin (Nitrostat) 0.4 MG SL tablet; Place 1 tablet under the tongue Every 5 (Five) Minutes As Needed for Chest Pain. Take no more than 3 doses in 15 minutes.  Dispense: 75 tablet; Refill: 1    2. Chronic midline low back pain without sciatica  -      HYDROcodone-acetaminophen (NORCO) 7.5-325 MG per tablet; Take 1 tablet by mouth 2 (Two) Times a Day As Needed for Moderate Pain .  Dispense: 60 tablet; Refill: 0    3. Generalized anxiety disorder  -     ALPRAZolam (XANAX) 1 MG tablet; Take 1 tablet by mouth At Night As Needed for Anxiety.  Dispense: 30 tablet; Refill: 0    4. Chronic stable angina (CMS/HCC)  -     ranolazine (RANEXA) 500 MG 12 hr tablet; Take 1 tablet by mouth Every 12 (Twelve) Hours.  Dispense: 180 tablet; Refill: 1    5. Acquired hypothyroidism  -     levothyroxine (SYNTHROID, LEVOTHROID) 100 MCG tablet; Take 1 tablet by mouth Daily. Does increased on 3/24/20  Dispense: 90 tablet; Refill: 1    6. Gastroesophageal reflux disease without esophagitis  -     famotidine (PEPCID) 20 MG tablet; Take 1 tablet by mouth 2 (Two) Times a Day As Needed for Heartburn.  Dispense: 180 tablet; Refill: 1    7. Essential hypertension  -     metoprolol succinate XL (TOPROL-XL) 50 MG 24 hr tablet; Take 1 tablet by mouth Daily.  Dispense: 90 tablet; Refill: 1  -     lisinopril (PRINIVIL,ZESTRIL) 2.5 MG tablet; Take 1 tablet by mouth Daily.  Dispense: 90 tablet; Refill: 1  -     isosorbide mononitrate (IMDUR) 30 MG 24 hr tablet; Take 1 tablet by mouth Daily.  Dispense: 90 tablet; Refill: 1  -     amLODIPine (NORVASC) 10 MG tablet; Take 1 tablet by mouth Daily.  Dispense: 90 tablet; Refill: 1    8. Insomnia, unspecified type  -     doxepin (SINEquan) 100 MG capsule; Take 1 capsule by mouth Every Night.  Dispense: 90 capsule; Refill: 1    9. Coronary artery disease of native artery of native heart with stable angina pectoris (CMS/HCC)  -     clopidogrel (PLAVIX) 75 MG tablet; Take 1 tablet by mouth Daily.  Dispense: 90 tablet; Refill: 1    10. Injury of right ankle, initial encounter         Stable chronic lower back pain hydrocodone refilled today stable chronic anxiety alprazolam refilled today.  Request refills of all chronic medications.  Chronic conditions stable  asymptomatic.  Recent injury of the right ankle with swelling and pain.  Advised to ice and elevate, return to office 3 to 5 days if swelling and pain do not recede or if they worsen.  Seek emergency medical care for any sudden or severe changes.  PHQ-2/PHQ-9 Depression Screening 9/4/2020   Little interest or pleasure in doing things 0   Feeling down, depressed, or hopeless 1   Trouble falling or staying asleep, or sleeping too much 0   Feeling tired or having little energy 0   Poor appetite or overeating 2   Feeling bad about yourself - or that you are a failure or have let yourself or your family down 0   Trouble concentrating on things, such as reading the newspaper or watching television 0   Moving or speaking so slowly that other people could have noticed. Or the opposite - being so fidgety or restless that you have been moving around a lot more than usual 0   Thoughts that you would be better off dead, or of hurting yourself in some way 0   Total Score 3   If you checked off any problems, how difficult have these problems made it for you to do your work, take care of things at home, or get along with other people? -     Patient understands the risks associated with this controlled medication, including tolerance and addiction.  Patient also agrees to only obtain this medication from me, and not from a another provider, unless that provider is covering for me in my absence.  Patient also agrees to be compliant in dosing, and not self adjust the dose of medication.  A signed controlled substance agreement is on file, and the patient has received a controlled substance education sheet at this a previous visit.  The patient has also signed a consent for treatment with a controlled substance as per [facility] policy. DONAL was obtained.    STEVE Abdi         Return in about 12 weeks (around 2/11/2021), or if symptoms worsen or fail to improve.    Patient Instructions   Right ankle pain significantly  increases, swelling increases patient is to come to the office for evaluation and x-rays or to seek emergency care at the hospital.

## 2020-11-19 NOTE — PATIENT INSTRUCTIONS
Right ankle pain significantly increases, swelling increases patient is to come to the office for evaluation and x-rays or to seek emergency care at the hospital.

## 2020-12-18 NOTE — TELEPHONE ENCOUNTER
Patient has chronic anxiety requiring benzodiazepine use concurrently with chronic pain requiring opiate pain medication and/ or gabapentin. Patient has been educated regarding potential dangers of oversedation and accidental overdose with use of both medications concurrently. Serial assessments of patient has yielded no sign of oversedation or adverse effects of patient, and he/she is advised and aware to notify my office immediately if symptoms do occur, as well as to discontinue use of benzodiazepine medication and opiate medication immediately if that occurs, pending medical evaluation and advice. Patient has been compliant with use of medications, UDS, visits with no adverse effects noted. Patient understands the risks associated with this controlled medication, including tolerance and addiction.  Patient also agrees to only obtain this medication from me, and not from a another provider, unless that provider is covering for me in my absence.  Patient also agrees to be compliant in dosing, and not self adjust the dose of medication.  A signed controlled substance agreement is on file, and the patient has received a controlled substance education sheet at this a previous visit.  The patient has also signed a consent for treatment with a controlled substance as per [facility] policy. DONAL was obtained. Refills were sent for:  Alprazolam and hydrocodone.     This document has been electronically signed by STEVE Abdi on December 18, 2020 16:18 CST

## 2021-01-01 ENCOUNTER — OFFICE VISIT (OUTPATIENT)
Dept: FAMILY MEDICINE CLINIC | Facility: CLINIC | Age: 86
End: 2021-01-01

## 2021-01-01 ENCOUNTER — HOME CARE VISIT (OUTPATIENT)
Dept: HOSPICE | Facility: HOSPICE | Age: 86
End: 2021-01-01

## 2021-01-01 ENCOUNTER — LAB (OUTPATIENT)
Dept: LAB | Facility: OTHER | Age: 86
End: 2021-01-01

## 2021-01-01 ENCOUNTER — TELEPHONE (OUTPATIENT)
Dept: FAMILY MEDICINE CLINIC | Facility: CLINIC | Age: 86
End: 2021-01-01

## 2021-01-01 ENCOUNTER — HOSPICE ADMISSION (OUTPATIENT)
Dept: HOSPICE | Facility: HOSPICE | Age: 86
End: 2021-01-01

## 2021-01-01 ENCOUNTER — HOME CARE VISIT (OUTPATIENT)
Dept: HOME HEALTH SERVICES | Facility: CLINIC | Age: 86
End: 2021-01-01

## 2021-01-01 ENCOUNTER — BULK ORDERING (OUTPATIENT)
Dept: CASE MANAGEMENT | Facility: OTHER | Age: 86
End: 2021-01-01

## 2021-01-01 ENCOUNTER — OFFICE VISIT (OUTPATIENT)
Dept: CARDIOLOGY | Facility: CLINIC | Age: 86
End: 2021-01-01

## 2021-01-01 VITALS
DIASTOLIC BLOOD PRESSURE: 70 MMHG | HEART RATE: 70 BPM | HEIGHT: 62 IN | WEIGHT: 162.8 LBS | OXYGEN SATURATION: 96 % | BODY MASS INDEX: 29.96 KG/M2 | SYSTOLIC BLOOD PRESSURE: 144 MMHG

## 2021-01-01 VITALS
HEART RATE: 70 BPM | SYSTOLIC BLOOD PRESSURE: 122 MMHG | DIASTOLIC BLOOD PRESSURE: 70 MMHG | RESPIRATION RATE: 16 BRPM | TEMPERATURE: 97.5 F

## 2021-01-01 VITALS
TEMPERATURE: 97.3 F | DIASTOLIC BLOOD PRESSURE: 68 MMHG | HEART RATE: 68 BPM | SYSTOLIC BLOOD PRESSURE: 124 MMHG | RESPIRATION RATE: 18 BRPM

## 2021-01-01 VITALS
RESPIRATION RATE: 16 BRPM | DIASTOLIC BLOOD PRESSURE: 60 MMHG | TEMPERATURE: 97.7 F | SYSTOLIC BLOOD PRESSURE: 100 MMHG | HEART RATE: 68 BPM

## 2021-01-01 VITALS
WEIGHT: 165 LBS | RESPIRATION RATE: 16 BRPM | SYSTOLIC BLOOD PRESSURE: 120 MMHG | DIASTOLIC BLOOD PRESSURE: 62 MMHG | OXYGEN SATURATION: 98 % | HEART RATE: 63 BPM | HEIGHT: 62 IN | BODY MASS INDEX: 30.36 KG/M2

## 2021-01-01 VITALS
HEIGHT: 62 IN | HEART RATE: 76 BPM | TEMPERATURE: 97.2 F | WEIGHT: 154.7 LBS | RESPIRATION RATE: 16 BRPM | DIASTOLIC BLOOD PRESSURE: 78 MMHG | BODY MASS INDEX: 28.47 KG/M2 | OXYGEN SATURATION: 98 % | SYSTOLIC BLOOD PRESSURE: 130 MMHG

## 2021-01-01 DIAGNOSIS — K64.9 HEMORRHOIDS, UNSPECIFIED HEMORRHOID TYPE: ICD-10-CM

## 2021-01-01 DIAGNOSIS — K62.89 RECTAL PAIN, CHRONIC: ICD-10-CM

## 2021-01-01 DIAGNOSIS — G89.29 CHRONIC MIDLINE LOW BACK PAIN WITHOUT SCIATICA: Chronic | ICD-10-CM

## 2021-01-01 DIAGNOSIS — D64.9 ANEMIA, UNSPECIFIED TYPE: ICD-10-CM

## 2021-01-01 DIAGNOSIS — M54.50 CHRONIC MIDLINE LOW BACK PAIN WITHOUT SCIATICA: Chronic | ICD-10-CM

## 2021-01-01 DIAGNOSIS — E53.8 B12 DEFICIENCY: ICD-10-CM

## 2021-01-01 DIAGNOSIS — F41.1 GENERALIZED ANXIETY DISORDER: Chronic | ICD-10-CM

## 2021-01-01 DIAGNOSIS — Z51.81 ENCOUNTER FOR THERAPEUTIC DRUG LEVEL MONITORING: Primary | ICD-10-CM

## 2021-01-01 DIAGNOSIS — Z13.820 ENCOUNTER FOR OSTEOPOROSIS SCREENING IN ASYMPTOMATIC POSTMENOPAUSAL PATIENT: ICD-10-CM

## 2021-01-01 DIAGNOSIS — I10 ESSENTIAL HYPERTENSION: ICD-10-CM

## 2021-01-01 DIAGNOSIS — I20.9 ANGINA PECTORIS (HCC): ICD-10-CM

## 2021-01-01 DIAGNOSIS — N18.31 STAGE 3A CHRONIC KIDNEY DISEASE (HCC): ICD-10-CM

## 2021-01-01 DIAGNOSIS — I20.8 CHRONIC STABLE ANGINA (HCC): Chronic | ICD-10-CM

## 2021-01-01 DIAGNOSIS — Z23 IMMUNIZATION DUE: ICD-10-CM

## 2021-01-01 DIAGNOSIS — I25.10 CORONARY ARTERY DISEASE INVOLVING NATIVE CORONARY ARTERY OF NATIVE HEART WITHOUT ANGINA PECTORIS: Primary | ICD-10-CM

## 2021-01-01 DIAGNOSIS — I25.118 CORONARY ARTERY DISEASE OF NATIVE ARTERY OF NATIVE HEART WITH STABLE ANGINA PECTORIS (HCC): ICD-10-CM

## 2021-01-01 DIAGNOSIS — E78.2 MIXED HYPERLIPIDEMIA: ICD-10-CM

## 2021-01-01 DIAGNOSIS — G47.00 INSOMNIA, UNSPECIFIED TYPE: Chronic | ICD-10-CM

## 2021-01-01 DIAGNOSIS — Z78.0 ENCOUNTER FOR OSTEOPOROSIS SCREENING IN ASYMPTOMATIC POSTMENOPAUSAL PATIENT: ICD-10-CM

## 2021-01-01 DIAGNOSIS — K59.09 CHRONIC CONSTIPATION: ICD-10-CM

## 2021-01-01 DIAGNOSIS — E03.9 ACQUIRED HYPOTHYROIDISM: Chronic | ICD-10-CM

## 2021-01-01 DIAGNOSIS — Z51.81 ENCOUNTER FOR THERAPEUTIC DRUG LEVEL MONITORING: ICD-10-CM

## 2021-01-01 DIAGNOSIS — I35.0 SEVERE AORTIC VALVE STENOSIS: ICD-10-CM

## 2021-01-01 DIAGNOSIS — I20.8 CHRONIC STABLE ANGINA (HCC): ICD-10-CM

## 2021-01-01 DIAGNOSIS — I25.118 CORONARY ARTERY DISEASE OF NATIVE ARTERY OF NATIVE HEART WITH STABLE ANGINA PECTORIS (HCC): Chronic | ICD-10-CM

## 2021-01-01 DIAGNOSIS — I10 ESSENTIAL HYPERTENSION: Chronic | ICD-10-CM

## 2021-01-01 DIAGNOSIS — K21.9 GASTROESOPHAGEAL REFLUX DISEASE WITHOUT ESOPHAGITIS: Chronic | ICD-10-CM

## 2021-01-01 DIAGNOSIS — R79.89 ABNORMAL CBC MEASUREMENT: ICD-10-CM

## 2021-01-01 DIAGNOSIS — E61.1 IRON DEFICIENCY: ICD-10-CM

## 2021-01-01 DIAGNOSIS — G89.29 RECTAL PAIN, CHRONIC: ICD-10-CM

## 2021-01-01 DIAGNOSIS — R68.89 EXERCISE INTOLERANCE: ICD-10-CM

## 2021-01-01 DIAGNOSIS — E11.42 TYPE 2 DIABETES MELLITUS WITH DIABETIC POLYNEUROPATHY, WITHOUT LONG-TERM CURRENT USE OF INSULIN (HCC): ICD-10-CM

## 2021-01-01 DIAGNOSIS — E03.9 ACQUIRED HYPOTHYROIDISM: Primary | ICD-10-CM

## 2021-01-01 DIAGNOSIS — E78.2 MIXED HYPERLIPIDEMIA: Primary | ICD-10-CM

## 2021-01-01 DIAGNOSIS — Z51.5 HOSPICE CARE: Primary | ICD-10-CM

## 2021-01-01 DIAGNOSIS — R68.81 EARLY SATIETY: ICD-10-CM

## 2021-01-01 DIAGNOSIS — E03.9 ACQUIRED HYPOTHYROIDISM: ICD-10-CM

## 2021-01-01 DIAGNOSIS — R06.02 SOB (SHORTNESS OF BREATH): ICD-10-CM

## 2021-01-01 DIAGNOSIS — R06.09 DOE (DYSPNEA ON EXERTION): ICD-10-CM

## 2021-01-01 DIAGNOSIS — K62.89 RECTAL PAIN: ICD-10-CM

## 2021-01-01 LAB
25(OH)D3 SERPL-MCNC: 21.9 NG/ML
6MAM UR QL CFM: NEGATIVE
6MAM/CREAT UR: NOT DETECTED NG/MG CREAT
7AMINOCLONAZEPAM/CREAT UR: NOT DETECTED NG/MG CREAT
A-OH ALPRAZ/CREAT UR: 647 NG/MG CREAT
A-OH-TRIAZOLAM/CREAT UR CFM: NOT DETECTED NG/MG CREAT
ALBUMIN SERPL-MCNC: 4.1 G/DL (ref 3.5–5)
ALBUMIN/GLOB SERPL: 1.6 G/DL (ref 1.1–1.8)
ALFENTANIL/CREAT UR CFM: NOT DETECTED NG/MG CREAT
ALP SERPL-CCNC: 58 U/L (ref 38–126)
ALPHA-HYDROXYMIDAZOLAM, URINE: NOT DETECTED NG/MG CREAT
ALPRAZ/CREAT UR CFM: 161 NG/MG CREAT
ALT SERPL W P-5'-P-CCNC: 10 U/L
AMOBARBITAL UR QL CFM: NOT DETECTED
AMPHET/CREAT UR: NOT DETECTED NG/MG CREAT
AMPHETAMINES UR QL CFM: NEGATIVE
ANION GAP SERPL CALCULATED.3IONS-SCNC: 7 MMOL/L (ref 5–15)
AST SERPL-CCNC: 21 U/L (ref 14–36)
BARBITAL UR QL CFM: NOT DETECTED
BARBITURATES UR QL CFM: NEGATIVE
BASOPHILS # BLD MANUAL: 0.09 10*3/MM3 (ref 0–0.2)
BASOPHILS NFR BLD AUTO: 1 % (ref 0–1.5)
BENZODIAZ UR QL CFM: NORMAL
BILIRUB SERPL-MCNC: 0.5 MG/DL (ref 0.2–1.3)
BUN SERPL-MCNC: 20 MG/DL (ref 7–23)
BUN/CREAT SERPL: 14 (ref 7–25)
BUPRENORPHINE UR QL CFM: NEGATIVE
BUPRENORPHINE/CREAT UR: NOT DETECTED NG/MG CREAT
BUTABARBITAL UR QL CFM: NOT DETECTED
BUTALBITAL UR QL CFM: NOT DETECTED
BZE/CREAT UR: NOT DETECTED NG/MG CREAT
CALCIUM SPEC-SCNC: 9.7 MG/DL (ref 8.4–10.2)
CANNABINOIDS UR QL CFM: NEGATIVE
CARBOXYTHC/CREAT UR: NOT DETECTED NG/MG CREAT
CHLORIDE SERPL-SCNC: 104 MMOL/L (ref 101–112)
CHOLEST SERPL-MCNC: 375 MG/DL (ref 150–200)
CLONAZEPAM/CREAT UR CFM: NOT DETECTED NG/MG CREAT
CO2 SERPL-SCNC: 28 MMOL/L (ref 22–30)
COCAETHYLENE/CREAT UR CFM: NOT DETECTED NG/MG CREAT
COCAINE UR QL CFM: NEGATIVE
COCAINE/CREAT UR CFM: NOT DETECTED NG/MG CREAT
CODEINE/CREAT UR: NOT DETECTED NG/MG CREAT
CREAT SERPL-MCNC: 1.43 MG/DL (ref 0.52–1.04)
CREAT UR-MCNC: 75 MG/DL
DEPRECATED RDW RBC AUTO: 42.3 FL (ref 37–54)
DESALKYLFLURAZ/CREAT UR: NOT DETECTED NG/MG CREAT
DESMETHYLFLUNITRAZEPAM: NOT DETECTED NG/MG CREAT
DHC/CREAT UR: 412 NG/MG CREAT
DIAZEPAM/CREAT UR: NOT DETECTED NG/MG CREAT
DRUGS UR: NORMAL
EDDP/CREAT UR: NOT DETECTED NG/MG CREAT
EOSINOPHIL # BLD MANUAL: 0.27 10*3/MM3 (ref 0–0.4)
EOSINOPHIL NFR BLD MANUAL: 3 % (ref 0.3–6.2)
ERYTHROCYTE [DISTWIDTH] IN BLOOD BY AUTOMATED COUNT: 12.6 % (ref 12.3–15.4)
ETHANOL UR CFM-MCNC: NOT DETECTED G/DL
ETHANOL UR QL CFM: NEGATIVE
FENTANYL UR QL CFM: NEGATIVE
FENTANYL/CREAT UR: NOT DETECTED NG/MG CREAT
FLUNITRAZEPAM UR QL CFM: NOT DETECTED NG/MG CREAT
FOLATE SERPL-MCNC: 5.6 NG/ML (ref 4.78–24.2)
GFR SERPL CREATININE-BSD FRML MDRD: 35 ML/MIN/1.73 (ref 39–90)
GLOBULIN UR ELPH-MCNC: 2.5 GM/DL (ref 2.3–3.5)
GLUCOSE SERPL-MCNC: 92 MG/DL (ref 70–99)
HBA1C MFR BLD: 5.59 % (ref 4.8–5.6)
HCT VFR BLD AUTO: 35.8 % (ref 34–46.6)
HDLC SERPL-MCNC: 53 MG/DL (ref 40–59)
HGB BLD-MCNC: 12 G/DL (ref 12–15.9)
HYDROCODONE/CREAT UR: 1373 NG/MG CREAT
HYDROMORPHONE/CREAT UR: 385 NG/MG CREAT
IRON 24H UR-MRATE: 100 MCG/DL (ref 37–145)
IRON SATN MFR SERPL: 36 % (ref 20–50)
LDLC SERPL CALC-MCNC: 263 MG/DL
LDLC/HDLC SERPL: 5.04 {RATIO} (ref 0–3.22)
LEVEL OF DETECTION:: NORMAL
LORAZEPAM/CREAT UR: NOT DETECTED NG/MG CREAT
LYMPHOCYTES # BLD MANUAL: 2.95 10*3/MM3 (ref 0.7–3.1)
LYMPHOCYTES NFR BLD MANUAL: 33 % (ref 19.6–45.3)
LYMPHOCYTES NFR BLD MANUAL: 8 % (ref 5–12)
MCH RBC QN AUTO: 31.8 PG (ref 26.6–33)
MCHC RBC AUTO-ENTMCNC: 33.5 G/DL (ref 31.5–35.7)
MCV RBC AUTO: 95 FL (ref 79–97)
MDA/CREAT UR: NOT DETECTED NG/MG CREAT
MDMA/CREAT UR: NOT DETECTED NG/MG CREAT
MEPHOBARBITAL UR QL CFM: NOT DETECTED
METHADONE UR QL CFM: NEGATIVE
METHADONE/CREAT UR: NOT DETECTED NG/MG CREAT
METHAMPHET/CREAT UR: NOT DETECTED NG/MG CREAT
MIDAZOLAM/CREAT UR CFM: NOT DETECTED NG/MG CREAT
MONOCYTES # BLD AUTO: 0.72 10*3/MM3 (ref 0.1–0.9)
MORPHINE/CREAT UR: NOT DETECTED NG/MG CREAT
N-NORTRAMADOL/CREAT UR CFM: NOT DETECTED NG/MG CREAT
NARCOTICS UR: NEGATIVE
NEUTROPHILS # BLD AUTO: 4.92 10*3/MM3 (ref 1.7–7)
NEUTROPHILS NFR BLD MANUAL: 55 % (ref 42.7–76)
NORBUPRENORPHINE/CREAT UR: NOT DETECTED NG/MG CREAT
NORCODEINE/CREAT UR CFM: NOT DETECTED NG/MG CREAT
NORDIAZEPAM/CREAT UR: NOT DETECTED NG/MG CREAT
NORFENTANYL/CREAT UR: NOT DETECTED NG/MG CREAT
NORHYDROCODONE/CREAT UR: 1332 NG/MG CREAT
NORMORPHINE UR-MCNC: NOT DETECTED NG/MG CREAT
NOROXYCODONE/CREAT UR: NOT DETECTED NG/MG CREAT
NOROXYMORPHONE/CREAT UR CFM: NOT DETECTED NG/MG CREAT
O-NORTRAMADOL UR CFM-MCNC: NOT DETECTED NG/MG CREAT
OPIATES UR QL CFM: NORMAL
OXAZEPAM/CREAT UR: NOT DETECTED NG/MG CREAT
OXYCODONE UR QL CFM: NEGATIVE
OXYCODONE/CREAT UR: NOT DETECTED NG/MG CREAT
OXYMORPHONE/CREAT UR: NOT DETECTED NG/MG CREAT
PENTOBARB UR QL CFM: NOT DETECTED
PHENOBARB UR QL CFM: NOT DETECTED
PLATELET # BLD AUTO: 230 10*3/MM3 (ref 140–450)
PMV BLD AUTO: 10.4 FL (ref 6–12)
POTASSIUM SERPL-SCNC: 4.5 MMOL/L (ref 3.4–5)
PROT SERPL-MCNC: 6.6 G/DL (ref 6.3–8.6)
QT INTERVAL: 478 MS
QTC INTERVAL: 516 MS
RBC # BLD AUTO: 3.77 10*6/MM3 (ref 3.77–5.28)
RBC MORPH BLD: NORMAL
SECOBARBITAL UR QL CFM: NOT DETECTED
SMALL PLATELETS BLD QL SMEAR: ADEQUATE
SODIUM SERPL-SCNC: 139 MMOL/L (ref 137–145)
SUFENTANIL/CREAT UR CFM: NOT DETECTED NG/MG CREAT
T3 SERPL-MCNC: 52.8 NG/DL (ref 80–200)
T4 FREE SERPL-MCNC: 1.52 NG/DL (ref 0.93–1.7)
TAPENTADOL UR QL CFM: NEGATIVE
TAPENTADOL/CREAT UR: NOT DETECTED NG/MG CREAT
TEMAZEPAM/CREAT UR: NOT DETECTED NG/MG CREAT
THIOPENTAL UR QL CFM: NOT DETECTED
TIBC SERPL-MCNC: 274 MCG/DL (ref 298–536)
TRAMADOL UR QL CFM: NOT DETECTED NG/MG CREAT
TRANSFERRIN SERPL-MCNC: 184 MG/DL (ref 200–360)
TRIGL SERPL-MCNC: 275 MG/DL
TSH SERPL DL<=0.05 MIU/L-ACNC: 1.75 UIU/ML (ref 0.27–4.2)
VIT B12 BLD-MCNC: 356 PG/ML (ref 211–946)
VLDLC SERPL-MCNC: 59 MG/DL (ref 5–40)
WBC # BLD AUTO: 8.94 10*3/MM3 (ref 3.4–10.8)
WBC MORPH BLD: NORMAL

## 2021-01-01 PROCEDURE — G0299 HHS/HOSPICE OF RN EA 15 MIN: HCPCS

## 2021-01-01 PROCEDURE — 80053 COMPREHEN METABOLIC PANEL: CPT | Performed by: NURSE PRACTITIONER

## 2021-01-01 PROCEDURE — G0481 DRUG TEST DEF 8-14 CLASSES: HCPCS | Performed by: NURSE PRACTITIONER

## 2021-01-01 PROCEDURE — 82607 VITAMIN B-12: CPT | Performed by: NURSE PRACTITIONER

## 2021-01-01 PROCEDURE — 84466 ASSAY OF TRANSFERRIN: CPT | Performed by: NURSE PRACTITIONER

## 2021-01-01 PROCEDURE — 36415 COLL VENOUS BLD VENIPUNCTURE: CPT | Performed by: NURSE PRACTITIONER

## 2021-01-01 PROCEDURE — 6520001 HSPC ROUTINE CARE

## 2021-01-01 PROCEDURE — 83036 HEMOGLOBIN GLYCOSYLATED A1C: CPT | Performed by: NURSE PRACTITIONER

## 2021-01-01 PROCEDURE — G2025 DIS SITE TELE SVCS RHC/FQHC: HCPCS | Performed by: NURSE PRACTITIONER

## 2021-01-01 PROCEDURE — 93000 ELECTROCARDIOGRAM COMPLETE: CPT | Performed by: INTERNAL MEDICINE

## 2021-01-01 PROCEDURE — 84439 ASSAY OF FREE THYROXINE: CPT | Performed by: NURSE PRACTITIONER

## 2021-01-01 PROCEDURE — 99214 OFFICE O/P EST MOD 30 MIN: CPT | Performed by: NURSE PRACTITIONER

## 2021-01-01 PROCEDURE — 84480 ASSAY TRIIODOTHYRONINE (T3): CPT | Performed by: NURSE PRACTITIONER

## 2021-01-01 PROCEDURE — 85025 COMPLETE CBC W/AUTO DIFF WBC: CPT | Performed by: NURSE PRACTITIONER

## 2021-01-01 PROCEDURE — 83540 ASSAY OF IRON: CPT | Performed by: NURSE PRACTITIONER

## 2021-01-01 PROCEDURE — 80307 DRUG TEST PRSMV CHEM ANLYZR: CPT | Performed by: NURSE PRACTITIONER

## 2021-01-01 PROCEDURE — 82746 ASSAY OF FOLIC ACID SERUM: CPT | Performed by: NURSE PRACTITIONER

## 2021-01-01 PROCEDURE — 80061 LIPID PANEL: CPT | Performed by: NURSE PRACTITIONER

## 2021-01-01 PROCEDURE — 99214 OFFICE O/P EST MOD 30 MIN: CPT | Performed by: INTERNAL MEDICINE

## 2021-01-01 PROCEDURE — 82306 VITAMIN D 25 HYDROXY: CPT | Performed by: NURSE PRACTITIONER

## 2021-01-01 PROCEDURE — 84443 ASSAY THYROID STIM HORMONE: CPT | Performed by: NURSE PRACTITIONER

## 2021-01-01 RX ORDER — HYDROCODONE BITARTRATE AND ACETAMINOPHEN 7.5; 325 MG/1; MG/1
1 TABLET ORAL 2 TIMES DAILY PRN
Qty: 60 TABLET | Refills: 0 | Status: SHIPPED | OUTPATIENT
Start: 2021-01-01 | End: 2021-01-01 | Stop reason: SDUPTHER

## 2021-01-01 RX ORDER — ALPRAZOLAM 1 MG/1
1 TABLET ORAL NIGHTLY PRN
Qty: 30 TABLET | Refills: 0 | Status: SHIPPED | OUTPATIENT
Start: 2021-01-01 | End: 2021-01-01 | Stop reason: SDUPTHER

## 2021-01-01 RX ORDER — POLYETHYLENE GLYCOL 3350 17 G/17G
17 POWDER, FOR SOLUTION ORAL DAILY
Qty: 765 G | Refills: 5 | Status: SHIPPED | OUTPATIENT
Start: 2021-01-01 | End: 2021-01-01

## 2021-01-01 RX ORDER — LISINOPRIL 2.5 MG/1
2.5 TABLET ORAL DAILY
Qty: 90 TABLET | Refills: 1 | Status: SHIPPED | OUTPATIENT
Start: 2021-01-01

## 2021-01-01 RX ORDER — MORPHINE SULFATE 100 MG/5ML
10 SOLUTION ORAL EVERY 4 HOURS PRN
Qty: 30 ML | Refills: 0 | Status: SHIPPED | OUTPATIENT
Start: 2021-01-01

## 2021-01-01 RX ORDER — AMLODIPINE BESYLATE 10 MG/1
10 TABLET ORAL DAILY
Qty: 90 TABLET | Refills: 1 | Status: SHIPPED | OUTPATIENT
Start: 2021-01-01

## 2021-01-01 RX ORDER — ISOSORBIDE MONONITRATE 30 MG/1
30 TABLET, EXTENDED RELEASE ORAL DAILY
Qty: 90 TABLET | Refills: 1 | Status: SHIPPED | OUTPATIENT
Start: 2021-01-01 | End: 2021-01-01

## 2021-01-01 RX ORDER — HYDROCORTISONE ACETATE 25 MG/1
25 SUPPOSITORY RECTAL 2 TIMES DAILY PRN
Qty: 10 SUPPOSITORY | Refills: 3 | Status: SHIPPED | OUTPATIENT
Start: 2021-01-01 | End: 2021-01-01 | Stop reason: SDUPTHER

## 2021-01-01 RX ORDER — DOXEPIN HYDROCHLORIDE 100 MG/1
100 CAPSULE ORAL NIGHTLY
Qty: 90 CAPSULE | Refills: 1 | Status: SHIPPED | OUTPATIENT
Start: 2021-01-01

## 2021-01-01 RX ORDER — HYDROCODONE BITARTRATE AND ACETAMINOPHEN 7.5; 325 MG/1; MG/1
1 TABLET ORAL EVERY 4 HOURS PRN
Qty: 90 TABLET | Refills: 0 | Status: SHIPPED | OUTPATIENT
Start: 2021-01-01

## 2021-01-01 RX ORDER — METOPROLOL SUCCINATE 50 MG/1
50 TABLET, EXTENDED RELEASE ORAL DAILY
Qty: 90 TABLET | Refills: 1 | Status: SHIPPED | OUTPATIENT
Start: 2021-01-01

## 2021-01-01 RX ORDER — NIACIN 750 MG/1
750 TABLET, EXTENDED RELEASE ORAL NIGHTLY
Qty: 90 TABLET | Refills: 3 | Status: SHIPPED | OUTPATIENT
Start: 2021-01-01 | End: 2021-01-01

## 2021-01-01 RX ORDER — CHOLESTYRAMINE LIGHT 4 G/5.7G
4 POWDER, FOR SUSPENSION ORAL 2 TIMES DAILY
Qty: 180 PACKET | Refills: 3 | Status: SHIPPED | OUTPATIENT
Start: 2021-01-01 | End: 2021-01-01 | Stop reason: SINTOL

## 2021-01-01 RX ORDER — FAMOTIDINE 20 MG/1
20 TABLET, FILM COATED ORAL 2 TIMES DAILY PRN
Qty: 180 TABLET | Refills: 1 | Status: SHIPPED | OUTPATIENT
Start: 2021-01-01

## 2021-01-01 RX ORDER — SYRINGE W-NEEDLE,DISPOSAB,3 ML 25GX5/8"
SYRINGE, EMPTY DISPOSABLE MISCELLANEOUS
Qty: 10 EACH | Refills: 3 | Status: SHIPPED | OUTPATIENT
Start: 2021-01-01

## 2021-01-01 RX ORDER — CYANOCOBALAMIN 1000 UG/ML
INJECTION, SOLUTION INTRAMUSCULAR; SUBCUTANEOUS
Qty: 4 ML | Refills: 3 | Status: SHIPPED | OUTPATIENT
Start: 2021-01-01 | End: 2021-01-01

## 2021-01-01 RX ORDER — RANOLAZINE 500 MG/1
500 TABLET, EXTENDED RELEASE ORAL EVERY 12 HOURS SCHEDULED
Qty: 180 TABLET | Refills: 1 | Status: SHIPPED | OUTPATIENT
Start: 2021-01-01

## 2021-01-01 RX ORDER — HYDROCORTISONE ACETATE 25 MG/1
25 SUPPOSITORY RECTAL 2 TIMES DAILY PRN
Qty: 10 SUPPOSITORY | Refills: 3 | Status: SHIPPED | OUTPATIENT
Start: 2021-01-01 | End: 2021-01-01

## 2021-01-01 RX ORDER — LEVOTHYROXINE SODIUM 0.1 MG/1
100 TABLET ORAL DAILY
Qty: 90 TABLET | Refills: 1 | Status: SHIPPED | OUTPATIENT
Start: 2021-01-01

## 2021-01-01 RX ORDER — NITROGLYCERIN 0.4 MG/1
0.4 TABLET SUBLINGUAL
Qty: 75 TABLET | Refills: 1 | Status: SHIPPED | OUTPATIENT
Start: 2021-01-01 | End: 2021-01-01

## 2021-01-01 RX ORDER — ALPRAZOLAM 1 MG/1
1 TABLET ORAL NIGHTLY PRN
Qty: 30 TABLET | Refills: 0 | Status: SHIPPED | OUTPATIENT
Start: 2021-01-01

## 2021-01-01 RX ORDER — CLOPIDOGREL BISULFATE 75 MG/1
75 TABLET ORAL DAILY
Qty: 90 TABLET | Refills: 1 | Status: SHIPPED | OUTPATIENT
Start: 2021-01-01

## 2021-01-12 NOTE — TELEPHONE ENCOUNTER
Patient has chronic anxiety requiring benzodiazepine use concurrently with chronic pain requiring opiate pain medication and/ or gabapentin. Patient has been educated regarding potential dangers of oversedation and accidental overdose with use of both medications concurrently. Serial assessments of patient has yielded no sign of oversedation or adverse effects of patient, and he/she is advised and aware to notify my office immediately if symptoms do occur, as well as to discontinue use of benzodiazepine medication and opiate medication immediately if that occurs, pending medical evaluation and advice. Patient has been compliant with use of medications, UDS, visits with no adverse effects noted. Patient understands the risks associated with this controlled medication, including tolerance and addiction.  Patient also agrees to only obtain this medication from me, and not from a another provider, unless that provider is covering for me in my absence.  Patient also agrees to be compliant in dosing, and not self adjust the dose of medication.  A signed controlled substance agreement is on file, and the patient has received a controlled substance education sheet at this a previous visit.  The patient has also signed a consent for treatment with a controlled substance as per [facility] policy. DONAL was obtained. Refills were sent for: hydrocodone and alprazolam.     This document has been electronically signed by STEVE Abdi on January 12, 2021 14:03 CST

## 2021-03-15 NOTE — TELEPHONE ENCOUNTER
----- Message from Petey Tejeda sent at 3/15/2021  1:42 PM CDT -----  Regarding: med refill  Needs refill on HYDROcodone-acetaminophen (NORCO) 7.5-325 MG per tablet &   ALPRAZolam (XANAX) 1 MG tablet sent to [facility] [place]

## 2021-03-17 NOTE — TELEPHONE ENCOUNTER
Patient has chronic anxiety requiring benzodiazepine use concurrently with chronic pain requiring opiate pain medication and/ or gabapentin. Patient has been educated regarding potential dangers of oversedation and accidental overdose with use of both medications concurrently. Serial assessments of patient has yielded no sign of oversedation or adverse effects of patient, and he/she is advised and aware to notify my office immediately if symptoms do occur, as well as to discontinue use of benzodiazepine medication and opiate medication immediately if that occurs, pending medical evaluation and advice. Patient has been compliant with use of medications, UDS, visits with no adverse effects noted. Patient understands the risks associated with this controlled medication, including tolerance and addiction.  Patient also agrees to only obtain this medication from me, and not from a another provider, unless that provider is covering for me in my absence.  Patient also agrees to be compliant in dosing, and not self adjust the dose of medication.  A signed controlled substance agreement is on file, and the patient has received a controlled substance education sheet at this a previous visit.  The patient has also signed a consent for treatment with a controlled substance as per [facility] policy. DONAL was obtained. Refills were sent for: alprazolam and hydrocodone.     This document has been electronically signed by SETVE Abdi on March 16, 2021 19:15 CDT

## 2021-04-13 NOTE — TELEPHONE ENCOUNTER
Patient has chronic anxiety requiring benzodiazepine use concurrently with chronic pain requiring opiate pain medication and/ or gabapentin. Patient has been educated regarding potential dangers of oversedation and accidental overdose with use of both medications concurrently. Serial assessments of patient has yielded no sign of oversedation or adverse effects of patient, and he/she is advised and aware to notify my office immediately if symptoms do occur, as well as to discontinue use of benzodiazepine medication and opiate medication immediately if that occurs, pending medical evaluation and advice. Patient has been compliant with use of medications, UDS, visits with no adverse effects noted. Patient understands the risks associated with this controlled medication, including tolerance and addiction.  Patient also agrees to only obtain this medication from me, and not from a another provider, unless that provider is covering for me in my absence.  Patient also agrees to be compliant in dosing, and not self adjust the dose of medication.  A signed controlled substance agreement is on file, and the patient has received a controlled substance education sheet at this a previous visit.  The patient has also signed a consent for treatment with a controlled substance as per [facility] policy. DONAL was obtained. Refills were sent for: Hydrocodone and alprazolam.        This document has been electronically signed by STEVE Abdi on April 13, 2021 17:05 CDT

## 2021-05-06 NOTE — PROGRESS NOTES
Subjective   Tatyana Yeager is a 86 y.o. female.       Chief Complaint   Patient presents with   • Anxiety     12 week f/u        History of Present Illness   Most recent labs collected 2/25/2021.      CMP creatinine 1.43, BUN normal.  EGFR 35.  Otherwise normal.    Total cholesterol 375, triglycerides 275, HDL 53, , VLDL 59, LDL/HDL ratio 5.08.    CBC normal    Thyroid function tests normal,    A1c normal    UDS appropriate    Vitamin B12 low at 356 requires replacement and folate normal    Iron profile: Iron 100 normal iron saturation 36 normal, transferrin low at 184, TIBC low at 274.    Labs discussed with patient prior to this visit and during this visit.    Mixed hyperlipidemia: Patient is intolerant to atorvastatin and Zocor, Crestor, Zetia, fenofibrate and cholestyramine.  Reports all over body aches like the flu when she takes any of these medications.  She thinks she has tried niacin in the past maybe once does not remember adverse effects.  Willing to try niacin again.  Currently she takes fish oil over-the-counter 2000 mg daily which is not helping significantly.  LDL in February was 263.  She is aware of risks of associated CAD events or ischemic brain events.    Chronic anxiety managed with alprazolam and doxepin.  Due for refill alprazolam today.  Compliant with use.  Denies adverse effects medication.  Reports adequate relief of anxiety and emotions pain well controlled at this time.  Course she is still grieving she has lost 2 adult daughters premature deaths, one just over a year ago.    Known CAD, managed with Imdur, Plavix, beta-blockade ACE inhibitor and Ranexa twice daily.  As needed nitroglycerin.  Follows with Dr. Jh Palma MD-cardiology.  Due for follow-up soon.    Vitamin B12 deficiency, chronic-stable.  Managed with monthly vitamin B12 injections.    GERD without esophagitis.  Managed with Pepcid with good reported symptom control.    Primary insomnia, stable.  Onset more than  1 year ago.  Worsening with death of daughter.  Reports adequate amount of restorative sleep with use of doxepin at bedtime.    Acquired hypothyroidism, stable.  Thyroid function tests are normal with daily medications including levothyroxine 100 MCG daily.    Stage 3 CKD: Has never seen nephrology referral advisable for monitoring due to age and current renal function levels and expected deterioration over time.    Hypertension well controlled in office today blood pressure is 120/62 heart rate is 63.  Current medications for blood pressure include amlodipine 10 mg p.o. daily, isosorbide mononitrate or Imdur 30 daily, lisinopril 2.5 mg and Toprol-XL 50 mg p.o. daily.    Other complaints today: Onset over 1 year ago.  Associated with chronic constipation and straining for stools.  Has to use Colace stool softener with no change in stool consistency or frequency.  Reports irregularly timed but regular use of over-the-counter laxatives 2 or 3 times a week.  She has rectal pain that has not been relieved with use of Preparation H, Preparation H steroid, Anusol steroid suppositories by prescription, and use of a variety of stool softeners and laxatives to soften the stools and reduce pushing/straining with stools which seems to make the painful area worse and larger.  She thinks this is a hemorrhoid that pops in and out of her rectum with straining.  The pain is becoming bothersome.  She would like to have something done with it.  Has not noticed any bleeding from the rectum, no blood on the toilet paper in the toilet after stools.    No other complaints today.    Parts of most recent relevant visit HPI, ROS  and PE may be carried forward and all are updated as appropriate for current situation.    Past Surgical History:   Procedure Laterality Date   • ANGIOPLASTY AORTIC  2013    Angioplasty, aorta (dilation) (stents of bilateral common iliacarteries)   2013 [facility]    • CATARACT EXTRACTION  12/19/2013    Remove  cataract, insert lens (Left eye.)   • CATARACT EXTRACTION  09/20/2006    Remove cataract, insert lens (nuclear cataract, right)   • CATARACT EXTRACTION Bilateral    • COLONOSCOPY  04/25/2012    Colon endoscopy 37129 (Diverticulum in sigmoid colon. Internal & external hemorrhoids found.)   • COLONOSCOPY  05/12/2008    Colon endoscopy (Rectal bleeding. Ischemic colitis (watershed region) w.bleeding w/o infarction. Internal hemorrhoids w/o bleeding)   • CORONARY ARTERY BYPASS GRAFT  01/12/2005    X2   • CRYOTHERAPY  12/12/2011    Destruction of Benign Lesion (1-14) 19928 (Actinic keratosis)    • ENDOSCOPY  04/25/2012    EGD w/ tube 33767 (Normal esophagus. Gastritis in stoamch. Biopsy taken. Normal duodenum. Biopsy taken   • ENDOSCOPY  05/12/2008    EGD with tube (Gastroesophageal reflux w/o esophagitis. 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 w/ rhomboid flap closure)   • EXCISION LESION      Remove nose lesion (1.5 mm punch biopsy,right tip of nose)   • HYSTERECTOMY      Partial hyst (for cervical cancer)   • INJECTION OF MEDICATION  10/04/2012    Kenalog (3)   • OTHER SURGICAL HISTORY  04/23/2013    EXTENDED VISUAL FIELDS STUDY 76509 (Glaucoma, primary open angle)    • OTHER SURGICAL HISTORY  09/16/2013    OCT DISC NFL 28652 (Glaucoma, borderline)   • SPINAL FUSION      Neck spinal fusion      Social History     Socioeconomic History   • Marital status:      Spouse name: Not on file   • Number of children: Not on file   • Years of education: Not on file   • Highest education level: Not on file   Tobacco Use   • Smoking status: Former Smoker     Packs/day: 1.00     Years: 14.00     Pack years: 14.00   • Smokeless tobacco: Never Used   • Tobacco comment: QUIT AT AGE 35   Substance and Sexual Activity   • Alcohol use: No   • Drug use:  No   • Sexual activity: Defer      The following portions of the patient's history were reviewed and updated as appropriate: She  has a past medical history of Acquired hypothyroidism, Aortic valve stenosis, Artificial lens present, Borderline glaucoma, Chronic depression, Chronic pain, Corneal foreign body, Coronary arteriosclerosis, Cortical senile cataract, Diabetes mellitus (CMS/[facility]), Diverticular disease of colon, Essential hypertension, Generalized anxiety disorder, GERD (gastroesophageal reflux disease), Histoplasmosis with retinitis, History of bone density study (03/26/2009), History of echocardiogram (09/16/2015), History of mammogram (03/26/2009), Hyperlipidemia, Joint pain, Low back pain, Mammogram declined (2013), Nonexudative age-related macular degeneration, Nuclear cataract, Peripheral vascular disease (CMS/[facility]), Posterior subcapsular polar senile cataract, Primary open angle glaucoma, Transient ischemic attack involving carotid artery, Transient visual loss, and Type 2 diabetes mellitus (CMS/[facility])..    Review of Systems   Constitutional: Negative.    HENT: Negative.  Negative for congestion.    Eyes: Negative.  Negative for blurred vision, double vision, photophobia and visual disturbance.   Respiratory: Negative.  Negative for cough, shortness of breath, wheezing and stridor.    Cardiovascular: Negative.  Negative for chest pain, palpitations and leg swelling.   Gastrointestinal: Negative.  Negative for abdominal distention, abdominal pain, blood in stool, constipation, diarrhea, nausea, vomiting, GERD and indigestion.   Endocrine: Negative.  Negative for cold intolerance and heat intolerance.   Genitourinary: Negative.  Negative for dysuria, flank pain, frequency and urinary incontinence.   Musculoskeletal: Positive for back pain. Negative for arthralgias.   Skin: Negative.    Allergic/Immunologic: Negative.  Negative for immunocompromised state.   Neurological: Negative.    Hematological:  "Negative.    Psychiatric/Behavioral: Negative for agitation, behavioral problems, decreased concentration, dysphoric mood, hallucinations, self-injury, sleep disturbance, suicidal ideas, negative for hyperactivity, depressed mood and stress. The patient is nervous/anxious.    All other systems reviewed and are negative.    PHQ-9 Depression Screening  Little interest or pleasure in doing things? 0   Feeling down, depressed, or hopeless? 0   Trouble falling or staying asleep, or sleeping too much?     Feeling tired or having little energy?     Poor appetite or overeating?     Feeling bad about yourself - or that you are a failure or have let yourself or your family down?     Trouble concentrating on things, such as reading the newspaper or watching television?     Moving or speaking so slowly that other people could have noticed? Or the opposite - being so fidgety or restless that you have been moving around a lot more than usual?     Thoughts that you would be better off dead, or of hurting yourself in some way?     PHQ-9 Total Score 0   If you checked off any problems, how difficult have these problems made it for you to do your work, take care of things at home, or get along with other people?        Objective    Vitals:    05/06/21 1307   BP: 120/62   BP Location: Left arm   Patient Position: Sitting   Cuff Size: Adult   Pulse: 63   Resp: 16   SpO2: 98%   Weight: 74.8 kg (165 lb)   Height: 157.5 cm (62.01\")   PainSc: 0-No pain       Physical Exam  Vitals and nursing note reviewed.   Constitutional:       General: She is not in acute distress.     Appearance: Normal appearance. She is well-developed. She is obese. She is not ill-appearing or diaphoretic.   HENT:      Head: Normocephalic and atraumatic.   Eyes:      General: No scleral icterus.        Right eye: No discharge.         Left eye: No discharge.      Conjunctiva/sclera: Conjunctivae normal.      Pupils: Pupils are equal, round, and reactive to light. " "  Neck:      Thyroid: No thyromegaly.      Vascular: No carotid bruit or JVD.      Trachea: No tracheal deviation.   Cardiovascular:      Rate and Rhythm: Normal rate and regular rhythm.      Pulses: Normal pulses.      Heart sounds: Normal heart sounds. No murmur heard.   No friction rub. No gallop.    Pulmonary:      Effort: Pulmonary effort is normal. No respiratory distress.      Breath sounds: Normal breath sounds. No stridor. No wheezing, rhonchi or rales.   Chest:      Chest wall: No tenderness.   Abdominal:      General: Bowel sounds are normal.      Palpations: Abdomen is soft.   Genitourinary:      Musculoskeletal:         General: No tenderness or deformity. Normal range of motion.      Cervical back: Normal range of motion and neck supple. No rigidity or tenderness.      Right lower leg: No edema.      Left lower leg: No edema.   Lymphadenopathy:      Cervical: No cervical adenopathy.   Skin:     General: Skin is warm and dry.      Capillary Refill: Capillary refill takes 2 to 3 seconds.      Coloration: Skin is not jaundiced or pale.      Findings: No bruising, erythema, lesion or rash.   Neurological:      General: No focal deficit present.      Mental Status: She is alert and oriented to person, place, and time. Mental status is at baseline.      Motor: No weakness.      Gait: Gait normal.   Psychiatric:         Mood and Affect: Mood normal.         Behavior: Behavior normal.         Thought Content: Thought content normal.         Judgment: Judgment normal.         Assessment/Plan   Diagnoses and all orders for this visit:    1. Mixed hyperlipidemia (Primary)  -     niacin (Niaspan) 750 MG CR tablet; Take 1 tablet by mouth Every Night. For cholestesterol  Dispense: 90 tablet; Refill: 3  -     Discontinue: cholestyramine light (Prevalite) 4 g packet; Take 1 packet by mouth 2 (Two) Times a Day. For cholesterol  Dispense: 180 packet; Refill: 3    2. B12 deficiency  -     Syringe 22G X 1\" 3 ML misc; 1 " syringe for each B12 (cyanocobalomin) injection  Dispense: 10 each; Refill: 3  -     cyanocobalamin 1000 MCG/ML injection; Inject 1000mcg (1mL) into fat or muscle every 7 days x 4 doses, then every 4 weeks ongoing. For low B12.  Dispense: 4 mL; Refill: 3    3. Chronic stable angina (CMS/HCC)  -     ranolazine (RANEXA) 500 MG 12 hr tablet; Take 1 tablet by mouth Every 12 (Twelve) Hours.  Dispense: 180 tablet; Refill: 1    4. Angina pectoris (CMS/HCC)  -     nitroglycerin (Nitrostat) 0.4 MG SL tablet; Place 1 tablet under the tongue Every 5 (Five) Minutes As Needed for Chest Pain. Take no more than 3 doses in 15 minutes.  Dispense: 75 tablet; Refill: 1    5. Essential hypertension  -     metoprolol succinate XL (TOPROL-XL) 50 MG 24 hr tablet; Take 1 tablet by mouth Daily.  Dispense: 90 tablet; Refill: 1  -     lisinopril (PRINIVIL,ZESTRIL) 2.5 MG tablet; Take 1 tablet by mouth Daily.  Dispense: 90 tablet; Refill: 1  -     isosorbide mononitrate (IMDUR) 30 MG 24 hr tablet; Take 1 tablet by mouth Daily.  Dispense: 90 tablet; Refill: 1  -     amLODIPine (NORVASC) 10 MG tablet; Take 1 tablet by mouth Daily.  Dispense: 90 tablet; Refill: 1    6. Acquired hypothyroidism  -     levothyroxine (SYNTHROID, LEVOTHROID) 100 MCG tablet; Take 1 tablet by mouth Daily. Does increased on 3/24/20  Dispense: 90 tablet; Refill: 1    7. Hemorrhoids, unspecified hemorrhoid type  -     hydrocortisone (ANUSOL-HC) 25 MG suppository; Insert 1 suppository into the rectum 2 (Two) Times a Day As Needed for Hemorrhoids.  Dispense: 10 suppository; Refill: 3    8. Gastroesophageal reflux disease without esophagitis  -     famotidine (PEPCID) 20 MG tablet; Take 1 tablet by mouth 2 (Two) Times a Day As Needed for Heartburn.  Dispense: 180 tablet; Refill: 1    9. Insomnia, unspecified type  -     doxepin (SINEquan) 100 MG capsule; Take 1 capsule by mouth Every Night.  Dispense: 90 capsule; Refill: 1    10. Coronary artery disease of native artery of  native heart with stable angina pectoris (CMS/[facility])  -     clopidogrel (PLAVIX) 75 MG tablet; Take 1 tablet by mouth Daily.  Dispense: 90 tablet; Refill: 1    11. Rectal pain  -     Cancel: Ambulatory Referral to General Surgery    12. Rectal pain, chronic  -     Ambulatory Referral to General Surgery    13. Chronic constipation  -     polyethylene glycol (MIRALAX) 17 GM/SCOOP powder; Take 17 g by mouth Daily. Mix in 8 oz of liquid, minimum, and drink every am. Hold dose 1 day if diarrhea and resume.  Dispense: 765 g; Refill: 5        Return in about 12 weeks (around 7/29/2021).           This document has been electronically signed by STEVE Abdi on May 6, 2021 14:20 CDT

## 2021-05-21 NOTE — TELEPHONE ENCOUNTER
Patient has chronic anxiety requiring benzodiazepine use concurrently with chronic pain requiring opiate pain medication and/ or gabapentin. Patient has been educated regarding potential dangers of oversedation and accidental overdose with use of both medications concurrently. Serial assessments of patient has yielded no sign of oversedation or adverse effects of patient, and he/she is advised and aware to notify my office immediately if symptoms do occur, as well as to discontinue use of benzodiazepine medication and opiate medication immediately if that occurs, pending medical evaluation and advice. Patient has been compliant with use of medications, UDS, visits with no adverse effects noted. Patient understands the risks associated with this controlled medication, including tolerance and addiction.  Patient also agrees to only obtain this medication from me, and not from a another provider, unless that provider is covering for me in my absence.  Patient also agrees to be compliant in dosing, and not self adjust the dose of medication.  A signed controlled substance agreement is on file, and the patient has received a controlled substance education sheet at this a previous visit.  The patient has also signed a consent for treatment with a controlled substance as per [facility] policy. DONAL was obtained. Refills were sent for:    hydrocodone and alprazolam.      This document has been electronically signed by STEVE Abdi on May 20, 2021 22:05 CDT

## 2021-06-03 NOTE — PROGRESS NOTES
[facility] Cardiology  OFFICE NOTE    Cardiovascular Medicine  Jh Palma M.D., RPVI         No referring provider defined for this encounter.    Thank you for asking me to see Tatyana Yeager for CAD.    History of Present Illness  This is a 86 y.o. female with:    1. Coronary artery disease involving 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 (CMS/HCC)    2. Peripheral arterial disease (CMS/HCC)    3. Essential hypertension    4. Mixed hyperlipidemia    5. Type 2 diabetes mellitus without complication, without long-term current use of insulin (CMS/HCC)    6. Chronic stable angina (CMS/[facility])          Chief complaint -angina     History of present Illness-86 y.o.-year-old lady with history of coronary disease prior CABG, with chronic stable angina and  has been on medical therapy with Imdur, amlodipine. metorool and Ranexa but she has been taking only Ranexa as needed because of cost I asked her to take it at least twice a day. Patient and family has been reluctant for invasive evauation due to age and underlying CKD.   Was recently seen by PCP patient has been complaining of bilateral lower extremity sensation of coldness, and burning.  Her symptoms are vague she reports sudden onset of severe sensation of coldness in both of her legs below the knee and then at times she will have it throughout her body.  No claudication per se.  She has been having stable angina with activities of daily living and resolves with sublingual nitroglycerin.  No new complaints at this point.    01/09/2020  No acute issues since last visit.  Her chest pain has improved after taking her Ranexa twice a day  No lightheadedness or dizziness.  No loss of consciousness  She is denying any claudications.    06/03/2021:  No acute issues.  Continues to have significant shortness of breath on exertion, also has chronic stable angina.  Episode but has worsened since the last time.    Review of Systems -  ROS  Constitution: Negative for weakness, weight gain and weight loss.   HENT: Negative for congestion.    Eyes: Negative for blurred vision.   Cardiovascular: As mentioned above  Respiratory: Negative for cough and hemoptysis.    Endocrine: Negative for polydipsia and polyuria.   Hematologic/Lymphatic: Negative for bleeding problem. Does not bruise/bleed easily.   Skin: Negative for flushing.   Musculoskeletal: Negative for neck pain and stiffness.   Gastrointestinal: Negative for abdominal pain, diarrhea, jaundice, melena, nausea and vomiting.   Genitourinary: Negative for dysuria and hematuria.   Neurological: Negative for dizziness, focal weakness and numbness.   Psychiatric/Behavioral: Negative for altered mental status and depression.          All other systems were reviewed and were negative.    family history includes Cirrhosis in her mother; Coronary artery disease in an other family member; Diabetes in an other family member; Heart disease in an other family member; Throat cancer in her father.     reports that she has quit smoking. She has a 14.00 pack-year smoking history. She has never used smokeless tobacco. She reports that she does not drink alcohol and does not use drugs.    Allergies   Allergen Reactions   • Prilosec [Omeprazole] Other (See Comments)     Gives her cramps in legs    • Statins Other (See Comments)     Gives patient muscle and joint pain    • Flexeril [Cyclobenzaprine] Unknown (See Comments)     Leg spasms   • Zetia [Ezetimibe] Other (See Comments)     Leg cramps         Current Outpatient Medications:   •  ALPRAZolam (XANAX) 1 MG tablet, Take 1 tablet by mouth At Night As Needed for Anxiety., Disp: 30 tablet, Rfl: 0  •  amLODIPine (NORVASC) 10 MG tablet, Take 1 tablet by mouth Daily., Disp: 90 tablet, Rfl: 1  •  clopidogrel (PLAVIX) 75 MG tablet, Take 1 tablet by mouth Daily., Disp: 90 tablet, Rfl: 1  •  cyanocobalamin 1000 MCG/ML injection, Inject 1000mcg (1mL) into fat or muscle  every 7 days x 4 doses, then every 4 weeks ongoing. For low B12., Disp: 4 mL, Rfl: 3  •  docusate sodium (COLACE) 250 MG capsule, Take 1 capsule by mouth Daily., Disp: 30 capsule, Rfl: 6  •  doxepin (SINEquan) 100 MG capsule, Take 1 capsule by mouth Every Night., Disp: 90 capsule, Rfl: 1  •  famotidine (PEPCID) 20 MG tablet, Take 1 tablet by mouth 2 (Two) Times a Day As Needed for Heartburn., Disp: 180 tablet, Rfl: 1  •  glucose blood test strip, To test blood glucose 1 - 2 times a day, Disp: 100 each, Rfl: 3  •  glucose monitor monitoring kit, For diabetes mellitus, Disp: 1 each, Rfl: 0  •  HYDROcodone-acetaminophen (NORCO) 7.5-325 MG per tablet, Take 1 tablet by mouth 2 (Two) Times a Day As Needed for Moderate Pain ., Disp: 60 tablet, Rfl: 0  •  hydrocortisone (ANUSOL-HC) 25 MG suppository, Insert 1 suppository into the rectum 2 (Two) Times a Day As Needed for Hemorrhoids., Disp: 10 suppository, Rfl: 3  •  isosorbide mononitrate (IMDUR) 30 MG 24 hr tablet, Take 1 tablet by mouth Daily., Disp: 90 tablet, Rfl: 1  •  Lancets misc, To test blood glucose 1 -2 times a day, Disp: 100 each, Rfl: 3  •  levothyroxine (SYNTHROID, LEVOTHROID) 100 MCG tablet, Take 1 tablet by mouth Daily. Does increased on 3/24/20, Disp: 90 tablet, Rfl: 1  •  lisinopril (PRINIVIL,ZESTRIL) 2.5 MG tablet, Take 1 tablet by mouth Daily., Disp: 90 tablet, Rfl: 1  •  metoprolol succinate XL (TOPROL-XL) 50 MG 24 hr tablet, Take 1 tablet by mouth Daily., Disp: 90 tablet, Rfl: 1  •  niacin (Niaspan) 750 MG CR tablet, Take 1 tablet by mouth Every Night. For cholestesterol, Disp: 90 tablet, Rfl: 3  •  nitroglycerin (Nitrostat) 0.4 MG SL tablet, Place 1 tablet under the tongue Every 5 (Five) Minutes As Needed for Chest Pain. Take no more than 3 doses in 15 minutes., Disp: 75 tablet, Rfl: 1  •  Omega-3 Fatty Acids (FISH OIL) 1000 MG capsule capsule, Take 1 capsule by mouth 2 (Two) Times a Day With Meals. OTC, Disp: 60 capsule, Rfl: 0  •  polyethylene  "glycol (MIRALAX) 17 GM/SCOOP powder, Take 17 g by mouth Daily. Mix in 8 oz of liquid, minimum, and drink every am. Hold dose 1 day if diarrhea and resume., Disp: 765 g, Rfl: 5  •  ranolazine (RANEXA) 500 MG 12 hr tablet, Take 1 tablet by mouth Every 12 (Twelve) Hours., Disp: 180 tablet, Rfl: 1  •  Syringe 22G X 1\" 3 ML misc, 1 syringe for each B12 (cyanocobalomin) injection, Disp: 10 each, Rfl: 3    Physical Exam:  Vitals:    06/03/21 1331   BP: 144/70   BP Location: Left arm   Patient Position: Sitting   Cuff Size: Adult   Pulse: 70   SpO2: 96%   Weight: 73.8 kg (162 lb 12.8 oz)   Height: 157.5 cm (62\")   PainSc: 0-No pain     Current Pain Level: none  Pulse Ox: Normal  on room air  General: alert, appears stated age and cooperative     Body Habitus: well-nourished    HEENT: Head: Normocephalic, no lesions, without obvious abnormality. No arcus senilis, xanthelasma or xanthomas.    Neuro: alert, oriented x3  Pulses: 2+ and symmetric  JVP: Volume/Pulsation: Normal.  Normal waveforms.   Appropriate inspiratory decrease.  No Kussmaul's. No Benjamin's.   Carotid Exam: no bruit normal pulsation bilaterally   Carotid Volume: normal.     Respirations: no increased work of breathing   Chest:  Normal    Pulmonary:Normal   Precordium: Normal impulses. P2 is not palpable.  RV Heave: absent  LV Heave: absent  Starkweather:  normal size and placement  Palpable S4: absent.  Heart rate: normal    Heart Rhythm: regular     Heart Sounds: S1: normal  S2: Absent.  S3: absent   S4: absent  Harsh ejection systolic murmur audible in aortic area radiating to carotids.  S2 not audible.  Opening Snap: absent    Pericardial Rub:  Absent: .    Abdomen:   Appearance: normal .  Palpation: Soft, non-tender to palpation, bowel sounds positive in all four quadrants; no guarding or rebound tenderness  Extremity: no edema.   LE Skin: no rashes  LE Hair:  normal  LE Pulses: well perfused with normal pulses in the distal extremities  Pallor on elevation: " Absent. Rubor on dependency: None      DATA REVIEWED:       ECG/EMG Results (all)     None        ---------------------------------------------------  TTE/RHIANNON:  Results for orders placed during the hospital encounter of 10/04/19    Adult Transthoracic Echo Complete W/ Cont if Necessary Per Protocol    Interpretation Summary  · Left ventricular systolic function is normal.  · Estimated EF appears to be in the range of 56 - 60%.  · Mild mitral valve regurgitation is present  · Mild mitral valve stenosis is present  · There is severe calcification of the aortic valve mainly affecting the non, left and right coronary cusp(s).  · Severe aortic valve stenosis is present.  · Mean gradient/Peak gradient of 40/67mmHg  · NENA by continuity is 0.59cm2 findings are c/w severe aortic stenosis  · Left atrial cavity size is moderately dilated.  · Left ventricular diastolic dysfunction (grade I a) consistent with impaired relaxation.        --------------------------------------------------------------------------------------------------  LABS:     The CVD Risk score (Beba et al., 2008) failed to calculate for the following reasons:    The 2008 CVD risk score is only valid for ages 30 to 74    The patient has a prior MI, stroke, CHF, or peripheral vascular disease diagnosis         Lab Results   Component Value Date    GLUCOSE 92 02/25/2021    BUN 20 02/25/2021    CREATININE 1.43 (H) 02/25/2021    EGFRIFNONA 35 (L) 02/25/2021    BCR 14.0 02/25/2021    K 4.5 02/25/2021    CO2 28.0 02/25/2021    CALCIUM 9.7 02/25/2021    ALBUMIN 4.10 02/25/2021    AST 21 02/25/2021    ALT 10 02/25/2021     Lab Results   Component Value Date    WBC 8.94 02/25/2021    HGB 12.0 02/25/2021    HCT 35.8 02/25/2021    MCV 95.0 02/25/2021     02/25/2021     Lab Results   Component Value Date    CHOL 375 (H) 02/25/2021    CHLPL 258 (H) 09/17/2015    TRIG 275 (H) 02/25/2021    HDL 53 02/25/2021     (H) 02/25/2021     Lab Results   Component  Value Date    TSH 1.750 02/25/2021    R4LQUNX 52.8 (L) 02/25/2021     Lab Results   Component Value Date    TROPONINI 1.870 (C) 08/30/2018     Lab Results   Component Value Date    HGBA1C 5.59 02/25/2021     No results found for: DDIMER  Lab Results   Component Value Date    ALT 10 02/25/2021     Lab Results   Component Value Date    HGBA1C 5.59 02/25/2021    HGBA1C 4.70 (L) 08/25/2020    HGBA1C 5.34 03/20/2020     Lab Results   Component Value Date    MICROALBUR 2.4 12/27/2019    CREATININE 1.43 (H) 02/25/2021     Lab Results   Component Value Date    IRON 100 02/25/2021    TIBC 274 (L) 02/25/2021    FERRITIN 434.00 (H) 08/25/2020     Lab Results   Component Value Date    INR 1.0 09/16/2015    PROTIME 12.9 09/16/2015       Assessment/Plan     1. CAD with prior history of CABG with chronic angina, on Ranexa, Imdur.  She is on antiplatelet therapy with aspirin and Plavix.  She continues to have chronic stable angina. Her symptoms have improved after taking her Ranexa twice daily.  We will stop her Imdur in setting of aortic stenosis.  I advised her to not take her nitroglycerin in the setting of severe aortic stenosis stenosis.  Amlodipine to 5 mg.  I had a long discussion with the patient and the family and they are very reluctant for invasive cardiac catheterization at this point due to her age, CKD and the fact that she was in the hospital for a long time after her previous procedure.  Contribution from aortic stenosis or chest pain as well.    2. Diabetes on oral agents.     3. Hypertension on amlodipine, Imdur, Toprol-XL.       4. Hyperlipidemia on Zetia as she cannot tolerate any of the statins.  Last visit we tried to have a pitavastatin however patient could not tolerate.    5. Severe aortic stenosis:  Repeat echo with severe aortic stenosis.  I long discussion with the patient for evaluation for TAVR.  I explained the risk, benefits, and alternatives.  Patient did not want any invasive evaluation or valve  replacement at this time.  Patient wants to be comfort care and DNR.    6. PAD  KALEN showed moderate reduced perfusion.  Denies any claudication at this point.  Continue ASA/plavix  Continue statin    7. Venous reflux:  Vebnous study without evidence of DVT but she did have significant reflux in superficial and deep venous system.   Will start her on compression stockings     Prevention:    Patient's Body mass index is 29.78 kg/m². BMI is above normal parameters. Recommendations include: exercise counseling and nutrition counseling.      Tatyana Yeager is not a smoker    AAA Screening:   Not needed            This document has been electronically signed by Jh Palma MD on Amanda 3, 2021 13:33 CDT

## 2021-06-17 NOTE — TELEPHONE ENCOUNTER
Patient has chronic anxiety requiring benzodiazepine use concurrently with chronic pain requiring opiate pain medication and/ or gabapentin. Patient has been educated regarding potential dangers of oversedation and accidental overdose with use of both medications concurrently. Serial assessments of patient has yielded no sign of oversedation or adverse effects of patient, and he/she is advised and aware to notify my office immediately if symptoms do occur, as well as to discontinue use of benzodiazepine medication and opiate medication immediately if that occurs, pending medical evaluation and advice. Patient has been compliant with use of medications, UDS, visits with no adverse effects noted. Patient understands the risks associated with this controlled medication, including tolerance and addiction.  Patient also agrees to only obtain this medication from me, and not from a another provider, unless that provider is covering for me in my absence.  Patient also agrees to be compliant in dosing, and not self adjust the dose of medication.  A signed controlled substance agreement is on file, and the patient has received a controlled substance education sheet at this a previous visit.  The patient has also signed a consent for treatment with a controlled substance as per [facility] policy. DONAL was obtained. Refills were sent for: alprazolam and hydrocodone.     This document has been electronically signed by STEVE Abdi on June 17, 2021 18:19 CDT

## 2021-06-21 NOTE — TELEPHONE ENCOUNTER
Pt meds called into wrong pharmacy sent to mail order   ALPRAZolam (XANAX) 1 MG tablet     HYDROcodone-acetaminophen (NORCO) 7.5-325 MG per tablet

## 2021-07-15 NOTE — TELEPHONE ENCOUNTER
Pt needing refill     ALPRAZolam (XANAX) 1 MG tablet    HYDROcodone-acetaminophen (NORCO) 7.5-325 MG per tablet

## 2021-07-15 NOTE — TELEPHONE ENCOUNTER
Patient has chronic anxiety requiring benzodiazepine use concurrently with chronic pain requiring opiate pain medication and/ or gabapentin. Patient has been educated regarding potential dangers of oversedation and accidental overdose with use of both medications concurrently. Serial assessments of patient has yielded no sign of oversedation or adverse effects of patient, and he/she is advised and aware to notify my office immediately if symptoms do occur, as well as to discontinue use of benzodiazepine medication and opiate medication immediately if that occurs, pending medical evaluation and advice. Patient has been compliant with use of medications, UDS, visits with no adverse effects noted. Patient understands the risks associated with this controlled medication, including tolerance and addiction.  Patient also agrees to only obtain this medication from me, and not from a another provider, unless that provider is covering for me in my absence.  Patient also agrees to be compliant in dosing, and not self adjust the dose of medication.  A signed controlled substance agreement is on file, and the patient has received a controlled substance education sheet at this a previous visit.  The patient has also signed a consent for treatment with a controlled substance as per [facility] policy. DONAL was obtained. Refills were sent for:   Alprazolam and hydrocodone.     This document has been electronically signed by STEVE Abdi on July 15, 2021 16:31 CDT

## 2021-07-16 NOTE — TELEPHONE ENCOUNTER
REFILLS WERE SENT TO WRONG PHARMACY AGAIN.    XANAX AND NORCO GO TO [facility] NOT MAIL ORDER THESE NEED TO BE RESENT

## 2021-07-29 NOTE — PROGRESS NOTES
Subjective   Tatyana Yeager is a 86 y.o. female.       Chief Complaint   Patient presents with   • Anxiety     12 week f/u        History of Present Illness       Chronic lower back pain secondary to DDD lumbar spine. Managed with hydrocodone. Stable. Reports adequate control of pain with current medications.   Not due for refill today. Will call when August refill is due.   Compliant with use.     Routine 12 week follow up of chronic anxiety managed with prn alprazolam, doxepin. Reports emotions are well controlled with current medications. No refill due today, having just filled alprazolam on 7/20/21. Will call when needed in August. Compliant with use.     Due for routine monitoring labs for chronic conditions and long term medication safety today. Orders entered.     New complaints today: SOB and MENDOZA limiting all activities as well as eating. Hx severe Ao Valvular stenosis, managed by Dr Palma, who has advised TAVR, which she has been determined not to have. Explained that SOB and MENDOZA will be the anticipated effect of severe aortic valve stenosis and she may feel better with oxygen if we can demonstrate an exercise induced hypoxia, in office. Otherwise, the only hope of improved breathing and exercise intolerance is to consider the TAVR as Dr Palma has proposed.    Endorses early satiety and chronic constipation today she feels is directly causing her SOB and MENDOZA. Advised that this may cause discomfort and fullness, but will not cause SOB and MENDOZA. Most likely needs oxygen for comfort measures or TAVR for the breathing problems. She requests referral back to GI for evaluation of early satiety, abd fullness and chronic constipation.     Parts of most recent relevant visit HPI, ROS  and PE may be carried forward and all are updated as appropriate for current situation.    Past Surgical History:   Procedure Laterality Date   • ANGIOPLASTY AORTIC  2013    Angioplasty, aorta (dilation) (stents of bilateral common  iliacarteries)   2013 EMMANUEL STATON    • CATARACT EXTRACTION  12/19/2013    Remove cataract, insert lens (Left eye.)   • CATARACT EXTRACTION  09/20/2006    Remove cataract, insert lens (nuclear cataract, right)   • CATARACT EXTRACTION Bilateral    • COLONOSCOPY  04/25/2012    Colon endoscopy 05359 (Diverticulum in sigmoid colon. Internal & external hemorrhoids found.)   • COLONOSCOPY  05/12/2008    Colon endoscopy (Rectal bleeding. Ischemic colitis (watershed region) w.bleeding w/o infarction. Internal hemorrhoids w/o bleeding)   • CORONARY ARTERY BYPASS GRAFT  01/12/2005    X2   • CRYOTHERAPY  12/12/2011    Destruction of Benign Lesion (1-14) 39432 (Actinic keratosis)    • ENDOSCOPY  04/25/2012    EGD w/ tube 54045 (Normal esophagus. Gastritis in stoamch. Biopsy taken. Normal duodenum. Biopsy taken   • ENDOSCOPY  05/12/2008    EGD with tube (Gastroesophageal reflux w/o esophagitis. 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 w/ rhomboid flap closure)   • EXCISION LESION      Remove nose lesion (1.5 mm punch biopsy,right tip of nose)   • HYSTERECTOMY      Partial hyst (for cervical cancer)   • INJECTION OF MEDICATION  10/04/2012    Kenalog (3)   • OTHER SURGICAL HISTORY  04/23/2013    EXTENDED VISUAL FIELDS STUDY 45667 (Glaucoma, primary open angle)    • OTHER SURGICAL HISTORY  09/16/2013    OCT DISC NFL 53255 (Glaucoma, borderline)   • SPINAL FUSION      Neck spinal fusion      Social History     Socioeconomic History   • Marital status:      Spouse name: Not on file   • Number of children: Not on file   • Years of education: Not on file   • Highest education level: Not on file   Tobacco Use   • Smoking status: Former Smoker     Packs/day: 1.00     Years: 14.00     Pack years: 14.00   • Smokeless tobacco: Never Used   • Tobacco comment:  QUIT AT AGE 35   Substance and Sexual Activity   • Alcohol use: No   • Drug use: No   • Sexual activity: Defer      The following portions of the patient's history were reviewed and updated as appropriate: She  has a past medical history of Acquired hypothyroidism, Aortic valve stenosis, Artificial lens present, Borderline glaucoma, Chronic depression, Chronic pain, Corneal foreign body, Coronary arteriosclerosis, Cortical senile cataract, Diabetes mellitus (CMS/HCC), Diverticular disease of colon, Essential hypertension, Generalized anxiety disorder, GERD (gastroesophageal reflux disease), Histoplasmosis with retinitis, History of bone density study (03/26/2009), History of echocardiogram (09/16/2015), History of mammogram (03/26/2009), Hyperlipidemia, Joint pain, Low back pain, Mammogram declined (2013), Nonexudative age-related macular degeneration, Nuclear cataract, Peripheral vascular disease (CMS/HCC), Posterior subcapsular polar senile cataract, Primary open angle glaucoma, Transient ischemic attack involving carotid artery, Transient visual loss, and Type 2 diabetes mellitus (CMS/[facility])..    Review of Systems   Constitutional: Negative.    HENT: Negative.  Negative for congestion.    Eyes: Negative.  Negative for blurred vision, double vision, photophobia and visual disturbance.   Respiratory: Negative.  Negative for cough, shortness of breath, wheezing and stridor.    Cardiovascular: Negative.  Negative for chest pain, palpitations and leg swelling.   Gastrointestinal: Negative.  Negative for abdominal distention, abdominal pain, blood in stool, constipation, diarrhea, nausea, vomiting, GERD and indigestion.   Endocrine: Negative.  Negative for cold intolerance and heat intolerance.   Genitourinary: Negative.  Negative for dysuria, flank pain, frequency and urinary incontinence.   Musculoskeletal: Positive for back pain. Negative for arthralgias.   Skin: Negative.    Allergic/Immunologic: Negative.  Negative  for immunocompromised state.   Neurological: Negative.    Hematological: Negative.    Psychiatric/Behavioral: Negative for agitation, behavioral problems, decreased concentration, dysphoric mood, hallucinations, self-injury, sleep disturbance, suicidal ideas, negative for hyperactivity, depressed mood and stress. The patient is nervous/anxious.    All other systems reviewed and are negative.    PHQ-9 Depression Screening  Little interest or pleasure in doing things?     Feeling down, depressed, or hopeless?     Trouble falling or staying asleep, or sleeping too much?     Feeling tired or having little energy?     Poor appetite or overeating?     Feeling bad about yourself - or that you are a failure or have let yourself or your family down?     Trouble concentrating on things, such as reading the newspaper or watching television?     Moving or speaking so slowly that other people could have noticed? Or the opposite - being so fidgety or restless that you have been moving around a lot more than usual?     Thoughts that you would be better off dead, or of hurting yourself in some way?     PHQ-9 Total Score     If you checked off any problems, how difficult have these problems made it for you to do your work, take care of things at home, or get along with other people?      Patient understands the risks associated with this controlled medication, including tolerance and addiction.  Patient also agrees to only obtain this medication from me, and not from a another provider, unless that provider is covering for me in my absence.  Patient also agrees to be compliant in dosing, and not self adjust the dose of medication.  A signed controlled substance agreement is on file, and the patient has received a controlled substance education sheet at this a previous visit.  The patient has also signed a consent for treatment with a controlled substance as per [facility] policy. DONAL was obtained.    Objective    Vitals:     "07/29/21 1309   BP: 130/78   BP Location: Left arm   Patient Position: Sitting   Cuff Size: Adult   Pulse: 76   Resp: 16   Temp: 97.2 °F (36.2 °C)   SpO2: 98%   Weight: 70.2 kg (154 lb 11.2 oz)   Height: 157.5 cm (62\")   PainSc:   2   PainLoc: Generalized     Body mass index is 28.3 kg/m².    Physical Exam  Vitals and nursing note reviewed.   Constitutional:       General: She is not in acute distress.     Appearance: Normal appearance. She is well-developed. She is obese. She is not ill-appearing or diaphoretic.   HENT:      Head: Normocephalic and atraumatic.   Eyes:      General: No scleral icterus.        Right eye: No discharge.         Left eye: No discharge.      Conjunctiva/sclera: Conjunctivae normal.      Pupils: Pupils are equal, round, and reactive to light.   Neck:      Thyroid: No thyromegaly.      Vascular: No carotid bruit or JVD.      Trachea: No tracheal deviation.   Cardiovascular:      Rate and Rhythm: Normal rate and regular rhythm.      Pulses: Normal pulses.      Heart sounds: Murmur heard.   Systolic murmur is present with a grade of 5/6.   No friction rub. No gallop.    Pulmonary:      Effort: Pulmonary effort is normal. No respiratory distress.      Breath sounds: Normal breath sounds. No stridor. No wheezing, rhonchi or rales.   Chest:      Chest wall: No tenderness.   Abdominal:      General: Bowel sounds are normal.      Palpations: Abdomen is soft.   Musculoskeletal:         General: No tenderness or deformity. Normal range of motion.      Cervical back: Normal range of motion and neck supple. No rigidity or tenderness.      Right lower leg: No edema.      Left lower leg: No edema.   Lymphadenopathy:      Cervical: No cervical adenopathy.   Skin:     General: Skin is warm and dry.      Capillary Refill: Capillary refill takes 2 to 3 seconds.      Coloration: Skin is not jaundiced or pale.      Findings: No bruising, erythema, lesion or rash.   Neurological:      General: No focal " deficit present.      Mental Status: She is alert and oriented to person, place, and time. Mental status is at baseline.      Motor: No weakness.      Gait: Gait normal.   Psychiatric:         Mood and Affect: Mood normal.         Behavior: Behavior normal.         Thought Content: Thought content normal.         Judgment: Judgment normal.     ambulation oxygenation test performed walked 4 minutes without desaturation but became so short of breath she had to stop. Advised this is not qualification for oxygen. She does not wish to have sleep study to see if she can get oxygen in the home for comfort measures by proving overnight desaturation.  She is still not wanting to under go TAVR.   We did discuss referral to Hospice for comfort measures to include oxygen, and she is in agreement with this. Referral sent  Referral to GI for early satiety and chronic constipation. Samples Linzess 72mcg given. Reports too expensive on her insurance to buy.  Labs are due today.       Assessment/Plan   Diagnoses and all orders for this visit:    1. Encounter for therapeutic drug level monitoring (Primary)  -     ToxASSURE Select 13 Discrete -  -     Gabapentin, Urine -    2. Generalized anxiety disorder  -     Ambulatory Referral to Home Hospice    3. Chronic midline low back pain without sciatica  -     Ambulatory Referral to Home Hospice    4. B12 deficiency  -     Vitamin B12 & Folate  -     Ambulatory Referral to Home Hospice    5. Essential hypertension  -     Comprehensive Metabolic Panel  -     CBC & Differential  -     Ambulatory Referral to Home Hospice    6. Acquired hypothyroidism  -     T4, Free  -     TSH  -     T3  -     Ambulatory Referral to Home Hospice    7. Mixed hyperlipidemia  -     Lipid Panel    8. Iron deficiency  -     CBC & Differential  -     Iron Profile    9. Chronic constipation  -     linaclotide (Linzess) 72 MCG capsule capsule; Take 1 capsule by mouth Every Morning Before Breakfast.  Dispense: 8  capsule; Refill: 0  -     Ambulatory Referral to Gastroenterology  -     Ambulatory Referral to Home Hospice    10. Early satiety  -     Ambulatory Referral to Gastroenterology  -     Ambulatory Referral to Home Hospice    11. Chronic stable angina (CMS/[facility])  -     Ambulatory Referral to Home Hospice    12. Coronary artery disease of native artery of native heart with stable angina pectoris (CMS/[facility])  -     Ambulatory Referral to Home Hospice    13. Stage 3a chronic kidney disease (CMS/[facility])  -     Ambulatory Referral to Home Hospice    14. Anemia, unspecified type  -     Ambulatory Referral to Home Hospice    15. Severe aortic valve stenosis  -     Ambulatory Referral to Home Hospice    16. Exercise intolerance  -     Ambulatory Referral to Home Hospice    17. SOB (shortness of breath)  -     Ambulatory Referral to Home Hospice    18. MENDOZA (dyspnea on exertion)  -     Ambulatory Referral to Home Hospice    Other orders  -     Cancel: Hemoglobin A1c  -     Cancel: Ambulatory Referral to Cardiology          Return in about 12 weeks (around 10/21/2021).           This document has been electronically signed by STEVE Abdi on July 29, 2021 17:32 CDT

## 2021-08-05 NOTE — TELEPHONE ENCOUNTER
----- Message from Anjelica Santacruz RN sent at 8/5/2021  1:52 PM CDT -----  Regarding: scripts  Can you please send narco ( 90 tabs) and roxanol ( 30 ml)  to [facility] on our new admit.  Thank you

## 2021-08-05 NOTE — HOSPICE
Provided information for hospice services. Questions answered. Pt and pt. daughter wish to start hospice servives at this time.

## 2021-08-05 NOTE — HOSPICE
"Patient admitted to [facility] with diagnosis of: CAD and reveals she has been having blood in her stool this week and very tired with soa sitting, Pt reveals 6 months ago that she began having pain in her chest with eating and noticed she became short of breath when going out. 3  months ago pt. became more tired and decreased walking with w/c use when going out. SOA worsened. ! month ago pt eating causing more chest pain and soa increased with ambulating in home short distances.  Pt denies being Uncomfortable because of pain.  Pt chooses DNR status and does not want any life sustaining measures of  IV/IV antibiotic, tube feeding, dialysis, intubation. Pt. does not want any hospitilzation.  During assessment patient reveals she is having of moderate dyspnea at times while sitting and always with activity. Oxygen ordered and reviewed safety with oxygen use.  Discussed Spiritual/Existential concerns with pt. states \" I am a Yarsani and believe in God, I know where I am going\".  Admission booklet explained and instructions given on hospice availability and how to access nurse 24 hours/day.  Educated on the prevention of COVID-19 per protocol. Instructed to call hospice any time with any needs, questions, or uncontrolled symptoms."

## 2021-08-06 NOTE — HOSPICE
Reviewed equipment with pt and family for proper use & safety. Pt reveals oxygen has made a huge difference in her comfort. Educated on the prevention of COVID-19 per protocol. Instructed to call hospice any time with any needs, questions, or uncontrolled symptoms.

## 2021-08-07 NOTE — HOSPICE
Pt. family called that pt. fell and hit her head.  Stated the bleeding had stopped and got her up but would like a visit.  On arrival had some scratches to rt. side of head, no bleeding, cleaned with soap and 4x4 to see if any deeper wound or issue identified.  No bleeding with cleaning and appear to be superficial but bruising started around right eye.  Stated she fell between 530 and 630 this morning, thought she heard something and tried to get up to walker, tripped and fell into the dresser and fan. c/o abdominal crampimg and not having bm in 4 days.  reviewed senna s dosing and bisacodyl use.  Pt. stated she hadn't eaten very much but was drinking a Breeze drink while present.  Reviewed pain and voiced VAS about 2-3 at present and had pain pill a couple hours earlier.  Stated stomach had bothered her since she started taking the pain pills.  Reviewed use of Roxanol for acute pain to get the pain under control quicker and to document how long it lasts and start back on Norco within 3 hours to keep discomfort under control or to use the Roxanol if it controled symptoms better and report to CM for consideration of a long acting pain medication.  Encouraged to keep Rollator in easy reach and to stand holding walker and get balance before trying to walk.  Pt. and CG voiced understanding to call Hospice if any changes or concerns.

## 2021-08-08 NOTE — HOSPICE
Patient sitting in the chair, with head lying to the side on arrival to home.  Patient noted with no respirations or pulse.  Patient pronounced @ 15:24.  Daughter request to call [facility] in [place] as patient had prearranged  details at the  home.  Emotional support provided to patient's daughter and son in law.  Patient's dentures given to  home when body received to  home.  Daughter had been in the room with patient all morning and had clean gown and robe on patient.  Discussed with daughter bereavement services hospice provides.  Hospice to call [facility] in morning to  equipment.

## 2021-12-10 NOTE — TELEPHONE ENCOUNTER
----- Message from Sri Mon MD sent at 12/14/2018  4:50 PM CST -----  Creatinine is 1.28 which is still ok - its changed very little from 3 months ago.  Potassium level is high - recommend to decrease foods in diet that are high in potassium and drink more fluids - avoid bananas, nuts, and potatoes.  Recommend a recheck in one week for bmp.  Please order.      Called pt and told her of results. Pt stated her understanding. BMP order has been placed. 12/17/18   No

## 2025-06-07 NOTE — PROGRESS NOTES
Subjective   Tatyana Yeager is a 82 y.o. female.   CC: HX of BPPV  History of Present Illness     Patient comes a family member states she's had some dizziness so she was spinning and nausea since late March.  Problem is gotten better the last month she was diagnosed with BPPV and her primary physician's office Dr. Hassan last month and placed on Antivert.  Patient also has a history of taking lorazepam at night.  She notes no new hearing problems, doesn't feel like her hearing is a problem, denies tinnitus, has no recent ear infection.  She denies any history of head trauma.  Denies prior history of ear problems, ear surgery, ear infections, or dizziness.  The patient states her dizziness is gotten much better recently.   Has already cut back on her use of Antivert compared to what she was using it.    The following portions of the patient's history were reviewed and updated as appropriate: allergies, current medications, past family history, past medical history, past social history, past surgical history and problem list.      Tatyana Yeager reports that she has quit smoking. She has a 14.00 pack-year smoking history. She has never used smokeless tobacco. She reports that she does not drink alcohol or use illicit drugs.  Patient is not a tobacco user and has not been counseled for use of tobacco products    Family History   Problem Relation Age of Onset   • Cirrhosis Mother      LIVER   • Throat cancer Father    • Coronary artery disease Other    • Diabetes Other    • Heart disease Other    • Breast cancer Neg Hx    • Colon cancer Neg Hx    • Endometrial cancer Neg Hx          Current Outpatient Prescriptions:   •  ALPRAZolam (XANAX) 1 MG tablet, Take 1 tablet by mouth At Night As Needed for Anxiety., Disp: 90 tablet, Rfl: 0  •  amLODIPine (NORVASC) 5 MG tablet, Take 1 tablet by mouth Daily., Disp: 90 tablet, Rfl: 1  •  clopidogrel (PLAVIX) 75 MG tablet, Take 1 tablet by mouth Daily., Disp: 90 tablet, Rfl:  1  •  doxepin (SINEquan) 100 MG capsule, Take 1 capsule by mouth Every Night., Disp: 90 capsule, Rfl: 1  •  famotidine (PEPCID) 20 MG tablet, Take 1 tablet by mouth 2 (Two) Times a Day., Disp: 180 tablet, Rfl: 1  •  glipiZIDE (GLUCOTROL) 5 MG tablet, Take 1 tablet by mouth 2 (Two) Times a Day Before Meals., Disp: 180 tablet, Rfl: 0  •  hydrochlorothiazide (HYDRODIURIL) 25 MG tablet, Take 1 tablet by mouth Daily., Disp: 90 tablet, Rfl: 1  •  HYDROcodone-acetaminophen (NORCO) 5-325 MG per tablet, Take 1 tablet by mouth 2 (Two) Times a Day As Needed for Moderate Pain (4-6)., Disp: 60 tablet, Rfl: 0  •  isosorbide mononitrate (IMDUR) 30 MG 24 hr tablet, Take 1 tablet by mouth Daily., Disp: 90 tablet, Rfl: 1  •  levothyroxine (SYNTHROID, LEVOTHROID) 88 MCG tablet, Take 1 tablet by mouth Daily., Disp: 90 tablet, Rfl: 1  •  linaclotide (LINZESS) 72 MCG capsule capsule, Take 1 capsule by mouth Every Other Day., Disp: 90 capsule, Rfl: 0  •  lisinopril (PRINIVIL,ZESTRIL) 2.5 MG tablet, Take 1 tablet by mouth Daily., Disp: 90 tablet, Rfl: 0  •  meclizine (ANTIVERT) 12.5 MG tablet, Take 1 tablet by mouth 2 (Two) Times a Day As Needed for dizziness., Disp: 180 tablet, Rfl: 0  •  metoprolol succinate XL (TOPROL-XL) 50 MG 24 hr tablet, Take 1 tablet by mouth Daily., Disp: 90 tablet, Rfl: 1  •  nitroglycerin (NITROSTAT) 0.4 MG SL tablet, Place 0.4 mg under the tongue. DISSOLVE ONE TABLET UNDER THE TONGUE AS NEEDED FOR CHEST PAIN. YOU MAY REPEAT ONCE IF NEEDED AND GO TO THE E.R., Disp: , Rfl:   •  Omega-3 Fatty Acids (FISH OIL) 1000 MG capsule capsule, Take 1 capsule by mouth 3 (Three) Times a Day With Meals., Disp: 270 capsule, Rfl: 1    Allergies   Allergen Reactions   • Prilosec [Omeprazole]    • Statins        Past Medical History:   Diagnosis Date   • Acquired hypothyroidism    • Aortic valve stenosis    • Artificial lens present    • Borderline glaucoma    • Chronic depression    • Chronic pain    • Corneal foreign body     OS    • Coronary arteriosclerosis    • Cortical senile cataract    • Diabetes mellitus     NO RETINOPATHY   • Diverticular disease of colon    • Essential hypertension    • Generalized anxiety disorder    • GERD (gastroesophageal reflux disease)    • Histoplasmosis with retinitis    • History of bone density study 03/26/2009    DXA BONE DENSITY AXIAL 62692 (Normal for the hips and lumbar region. Hips represent 3.9% improvement.Lumbar represents 1.3 % improvement)   • History of echocardiogram 09/16/2015    Echocardiogram W/ color flow 65370 (Overall LV contractility normal with Ef of 55% with LVH and diastolic relaxation abnormality of the left ventricle.Moderate to severe AV stenosis.Mild mitral regurg.No intracardiac mass or thrombus.)   • History of mammogram 03/26/2009    benign findings    • Hyperlipidemia     not able to take statin     • Joint pain    • Low back pain    • Mammogram declined 2013   • Nonexudative age-related macular degeneration     MILD   • Nuclear cataract    • Peripheral vascular disease    • Posterior subcapsular polar senile cataract    • Primary open angle glaucoma    • Transient ischemic attack involving carotid artery     Carotid territory transient ischemic attack     • Transient visual loss    • Type 2 diabetes mellitus          Review of Systems   Constitutional: Negative for fever.   HENT: Negative for ear discharge and ear pain.    Neurological: Positive for dizziness. Negative for facial asymmetry.   All other systems reviewed and are negative.          Objective   Physical Exam   Constitutional: She is oriented to person, place, and time. She appears well-developed and well-nourished.   HENT:   Head: Normocephalic and atraumatic.   Right Ear: Hearing, tympanic membrane, external ear and ear canal normal.   Left Ear: Hearing, tympanic membrane, external ear and ear canal normal.   Nose: Nose normal. No mucosal edema, rhinorrhea, nasal deformity or septal deviation. No epistaxis.  Right sinus exhibits no maxillary sinus tenderness and no frontal sinus tenderness. Left sinus exhibits no maxillary sinus tenderness and no frontal sinus tenderness.   Mouth/Throat: Uvula is midline, oropharynx is clear and moist and mucous membranes are normal. She has dentures. No trismus in the jaw. Normal dentition. No oropharyngeal exudate or posterior oropharyngeal edema. No tonsillar exudate.   Eyes: Conjunctivae and EOM are normal. Pupils are equal, round, and reactive to light.   Neck: Normal range of motion. Neck supple. No JVD present. No tracheal deviation present. No thyromegaly present.   Cardiovascular: Normal rate.    Pulmonary/Chest: Effort normal and breath sounds normal.   Musculoskeletal: Normal range of motion.   Lymphadenopathy:        Head (right side): No submental, no submandibular, no tonsillar, no preauricular, no posterior auricular and no occipital adenopathy present.        Head (left side): No submental, no submandibular, no tonsillar, no preauricular, no posterior auricular and no occipital adenopathy present.     She has no cervical adenopathy.        Right cervical: No superficial cervical, no deep cervical and no posterior cervical adenopathy present.       Left cervical: No superficial cervical, no deep cervical and no posterior cervical adenopathy present.   Neurological: She is alert and oriented to person, place, and time. No cranial nerve deficit. She displays a negative Romberg sign. Coordination and gait normal.   Impulse testing, Rhomberg, finger to nose, alternating movement, heel to shin, gait and stance and cerebellar normal.  Watson Hallpike to the left is positive subjectively with minimal nystagmus this recovered with visual fixation   Skin: Skin is warm.   Psychiatric: She has a normal mood and affect. Her speech is normal and behavior is normal. Thought content normal.   Nursing note and vitals reviewed.            Assessment/Plan   Tatyana was seen today for  other.    Diagnoses and all orders for this visit:    BPPV (benign paroxysmal positional vertigo), left  -     Ambulatory Referral to Physical Therapy Vestibular    Suggested physical therapy the patient's symptoms are improving  Audiologist was dizzy today and since the patient hadhearing symptoms or tenderness postponed hearing testing.  Discussed the findings with both she and her daughter.  OB very careful about dangerous activity.  I suggested that she come back and see me after she finishes physical therapy and she discontinue the meclizine.   Past that meclizine, that medication could  hinder her recovery   She also uses nightly Xanax for anxiety.   Discussed her activities to avoid to minimize following  The patient says it is a great difficulty for her to come for [place] area prefer to be seen in follow-up in [place].  I explained her that I don't go to [place] Dr. Bianchi does,  Was originally scheduled to see him but that was put off for an earlier appointment  The patient not improving full audiology testing and EMG to be considered and possibly imaging that she's Pepe improving symptomatically     Home